# Patient Record
Sex: FEMALE | Race: WHITE | Employment: FULL TIME | URBAN - METROPOLITAN AREA
[De-identification: names, ages, dates, MRNs, and addresses within clinical notes are randomized per-mention and may not be internally consistent; named-entity substitution may affect disease eponyms.]

---

## 2018-06-04 ENCOUNTER — OFFICE VISIT (OUTPATIENT)
Dept: FAMILY MEDICINE CLINIC | Age: 38
End: 2018-06-04

## 2018-06-04 VITALS
WEIGHT: 166.6 LBS | SYSTOLIC BLOOD PRESSURE: 122 MMHG | BODY MASS INDEX: 29.51 KG/M2 | DIASTOLIC BLOOD PRESSURE: 60 MMHG | RESPIRATION RATE: 14 BRPM | HEART RATE: 60 BPM | OXYGEN SATURATION: 98 %

## 2018-06-04 DIAGNOSIS — G43.809 OTHER MIGRAINE WITHOUT STATUS MIGRAINOSUS, NOT INTRACTABLE: Primary | ICD-10-CM

## 2018-06-04 PROCEDURE — 99214 OFFICE O/P EST MOD 30 MIN: CPT | Performed by: FAMILY MEDICINE

## 2018-06-04 RX ORDER — TRAZODONE HYDROCHLORIDE 50 MG/1
50 TABLET ORAL NIGHTLY
COMMUNITY
End: 2019-01-08

## 2018-06-04 RX ORDER — SERTRALINE HYDROCHLORIDE 25 MG/1
25 TABLET, FILM COATED ORAL DAILY
COMMUNITY
End: 2019-01-08

## 2018-06-04 RX ORDER — SUMATRIPTAN 100 MG/1
1 TABLET, FILM COATED ORAL DAILY
COMMUNITY
Start: 2018-05-29 | End: 2019-01-08

## 2018-06-04 ASSESSMENT — PATIENT HEALTH QUESTIONNAIRE - PHQ9
SUM OF ALL RESPONSES TO PHQ9 QUESTIONS 1 & 2: 0
SUM OF ALL RESPONSES TO PHQ QUESTIONS 1-9: 0
1. LITTLE INTEREST OR PLEASURE IN DOING THINGS: 0
2. FEELING DOWN, DEPRESSED OR HOPELESS: 0

## 2018-06-05 DIAGNOSIS — G43.809 OTHER MIGRAINE WITHOUT STATUS MIGRAINOSUS, NOT INTRACTABLE: ICD-10-CM

## 2018-06-05 LAB
ANION GAP SERPL CALCULATED.3IONS-SCNC: 16 MMOL/L (ref 3–16)
BUN BLDV-MCNC: 23 MG/DL (ref 7–20)
C-REACTIVE PROTEIN: 2.7 MG/L (ref 0–5.1)
CALCIUM SERPL-MCNC: 8.8 MG/DL (ref 8.3–10.6)
CHLORIDE BLD-SCNC: 104 MMOL/L (ref 99–110)
CO2: 22 MMOL/L (ref 21–32)
CREAT SERPL-MCNC: 0.7 MG/DL (ref 0.6–1.1)
GFR AFRICAN AMERICAN: >60
GFR NON-AFRICAN AMERICAN: >60
GLUCOSE BLD-MCNC: 81 MG/DL (ref 70–99)
HCT VFR BLD CALC: 42.3 % (ref 36–48)
HEMOGLOBIN: 14.4 G/DL (ref 12–16)
MCH RBC QN AUTO: 28.7 PG (ref 26–34)
MCHC RBC AUTO-ENTMCNC: 34 G/DL (ref 31–36)
MCV RBC AUTO: 84.3 FL (ref 80–100)
PDW BLD-RTO: 13.2 % (ref 12.4–15.4)
PLATELET # BLD: 226 K/UL (ref 135–450)
PMV BLD AUTO: 8 FL (ref 5–10.5)
POTASSIUM SERPL-SCNC: 3.9 MMOL/L (ref 3.5–5.1)
RBC # BLD: 5.01 M/UL (ref 4–5.2)
SEDIMENTATION RATE, ERYTHROCYTE: 8 MM/HR (ref 0–20)
SODIUM BLD-SCNC: 142 MMOL/L (ref 136–145)
TSH SERPL DL<=0.05 MIU/L-ACNC: 0.01 UIU/ML (ref 0.27–4.2)
WBC # BLD: 7.2 K/UL (ref 4–11)

## 2018-06-05 PROCEDURE — 36415 COLL VENOUS BLD VENIPUNCTURE: CPT | Performed by: FAMILY MEDICINE

## 2018-06-07 DIAGNOSIS — R79.89 ABNORMAL TSH: Primary | ICD-10-CM

## 2018-06-07 DIAGNOSIS — R79.89 ABNORMAL TSH: ICD-10-CM

## 2018-06-07 LAB
FREE THYROXINE INDEX: 8.3 UG/DL (ref 4.4–11.4)
T3 UPTAKE PERCENT: 1 TBI (ref 0.8–1.3)
T4 FREE: 1.5 NG/DL (ref 0.9–1.8)
T4 TOTAL: 8.3 UG/DL (ref 4.5–10.9)

## 2018-08-10 ENCOUNTER — OFFICE VISIT (OUTPATIENT)
Dept: ORTHOPEDIC SURGERY | Age: 38
End: 2018-08-10

## 2018-08-10 VITALS
BODY MASS INDEX: 29.23 KG/M2 | SYSTOLIC BLOOD PRESSURE: 103 MMHG | WEIGHT: 165 LBS | HEIGHT: 63 IN | DIASTOLIC BLOOD PRESSURE: 69 MMHG | HEART RATE: 67 BPM

## 2018-08-10 DIAGNOSIS — S83.241A TEAR OF MEDIAL MENISCUS OF RIGHT KNEE, CURRENT, UNSPECIFIED TEAR TYPE, INITIAL ENCOUNTER: Primary | ICD-10-CM

## 2018-08-10 DIAGNOSIS — M25.561 RIGHT KNEE PAIN, UNSPECIFIED CHRONICITY: ICD-10-CM

## 2018-08-10 PROCEDURE — 99214 OFFICE O/P EST MOD 30 MIN: CPT | Performed by: ORTHOPAEDIC SURGERY

## 2018-08-10 ASSESSMENT — ENCOUNTER SYMPTOMS: BACK PAIN: 1

## 2018-08-11 NOTE — PROGRESS NOTES
Chief complaint is right knee pain. Patient voices fell last week and twisted her right knee. She's having pain and weakness. Her pain is present anteriorly. She also has medial sided pain with associated with twisting turning movements. He reports having had a meniscus surgery 15 years ago.     Pain Assessment  Location of Pain: Knee  Location Modifiers: Right  Severity of Pain: 6  Quality of Pain: Sharp, Aching  Duration of Pain: Persistent  Frequency of Pain: Constant  Aggravating Factors: Stairs, Bending  Limiting Behavior: Some  Relieving Factors: Rest, Nsaids  Result of Injury: Yes  Work-Related Injury: No  Are there other pain locations you wish to document?: No    Past Medical History:   Diagnosis Date    Arthritis     Bipolar affective disorder (HCC)     Borderline personality disorder     IBS (irritable bowel syndrome)     Neck pain     Post hysterectomy menopause syndrome         Past Surgical History:   Procedure Laterality Date    APPENDECTOMY      CHOLECYSTECTOMY      HYSTERECTOMY      ovarian cyst, adenomyosis- benign    KNEE ARTHROSCOPY Right     KNEE SURGERY      OVARIAN CYST REMOVAL      OVARY REMOVAL      R    SINUS SURGERY      TUBAL LIGATION         Family History   Problem Relation Age of Onset    Mental Retardation Father         bipolar    Mental Retardation Sister         bipolar       Social History     Social History    Marital status:      Spouse name: N/A    Number of children: 2    Years of education: N/A     Occupational History    massage sales      Social History Main Topics    Smoking status: Never Smoker    Smokeless tobacco: Never Used    Alcohol use No    Drug use: No    Sexual activity: Yes     Partners: Male      Comment: M, 2 kids,      Other Topics Concern    None     Social History Narrative    None       Current Outpatient Prescriptions   Medication Sig Dispense Refill    lurasidone (LATUDA) 80 MG TABS tablet Take 80 mg by

## 2018-08-17 ENCOUNTER — OFFICE VISIT (OUTPATIENT)
Dept: ORTHOPEDIC SURGERY | Age: 38
End: 2018-08-17

## 2018-08-17 VITALS
SYSTOLIC BLOOD PRESSURE: 103 MMHG | HEIGHT: 63 IN | BODY MASS INDEX: 29.23 KG/M2 | HEART RATE: 85 BPM | DIASTOLIC BLOOD PRESSURE: 74 MMHG | WEIGHT: 165 LBS

## 2018-08-17 DIAGNOSIS — M17.11 PATELLOFEMORAL ARTHRITIS OF RIGHT KNEE: Primary | ICD-10-CM

## 2018-08-17 DIAGNOSIS — M71.21 BAKER'S CYST OF KNEE, RIGHT: ICD-10-CM

## 2018-08-17 PROCEDURE — 99214 OFFICE O/P EST MOD 30 MIN: CPT | Performed by: ORTHOPAEDIC SURGERY

## 2018-08-17 RX ORDER — MELOXICAM 15 MG/1
15 TABLET ORAL DAILY
Qty: 30 TABLET | Refills: 2 | Status: SHIPPED | OUTPATIENT
Start: 2018-08-17 | End: 2019-01-08

## 2018-08-17 ASSESSMENT — ENCOUNTER SYMPTOMS: BACK PAIN: 1

## 2018-08-17 NOTE — PROGRESS NOTES
46yo female comes in today for review of her right knee mri images. She states her pain is unchanged from her visit 7 days ago. There was concerned for a meniscus tear. Her pain has been present for a couple weeks and, she has been taking 800mg ibuprofen, BID. She continues to complain of anterior knee pain that bothers her mainly with stairs and running. MRI images reviewed showing Baker's cyst and patellofemoral arthritis.      Patellofemoral arthritis, right knee  Baker's cyst, right knee    -meloxicam   -PT   -f/u 4 weeks

## 2018-08-21 ENCOUNTER — HOSPITAL ENCOUNTER (OUTPATIENT)
Dept: PHYSICAL THERAPY | Age: 38
Setting detail: THERAPIES SERIES
Discharge: HOME | End: 2018-08-21
Payer: COMMERCIAL

## 2018-08-21 PROCEDURE — G8978 MOBILITY CURRENT STATUS: HCPCS | Performed by: PHYSICAL THERAPIST

## 2018-08-21 PROCEDURE — 97110 THERAPEUTIC EXERCISES: CPT | Performed by: PHYSICAL THERAPIST

## 2018-08-21 PROCEDURE — 97161 PT EVAL LOW COMPLEX 20 MIN: CPT | Performed by: PHYSICAL THERAPIST

## 2018-08-21 PROCEDURE — G8979 MOBILITY GOAL STATUS: HCPCS | Performed by: PHYSICAL THERAPIST

## 2018-08-21 PROCEDURE — 97112 NEUROMUSCULAR REEDUCATION: CPT | Performed by: PHYSICAL THERAPIST

## 2018-08-21 NOTE — PLAN OF CARE
The [address] [place] [address]  Phone [phone]  Fax [phone]                                                       Physical Therapy Certification    Dear Referring Practitioner: Dr. Angel Sawyer ,    We had the pleasure of evaluating the following patient for physical therapy services at [address] [place] [address]. A summary of our findings can be found in the initial assessment below. This includes our plan of care. If you have any questions or concerns regarding these findings, please do not hesitate to contact me at the office phone number checked above. Thank you for the referral.       Physician Signature:_______________________________Date:__________________  By signing above (or electronic signature), therapists plan is approved by physician      Patient: Nusrat López   : 1980   MRN: [id number]  Referring Physician: Referring Practitioner: Dr. Angel Sawyer       Evaluation Date: 2018      Medical Diagnosis Information:  Diagnosis: M17.11 (ICD-10-CM) - Patellofemoral arthritis of right knee, M71.21 (ICD-10-CM) - Baker's cyst of knee, right    Treatment Diagnosis: Right knee pain                                          Insurance information: PT Insurance Information: [place]      Precautions/ Contra-indications:   Latex Allergy:  [x]NO      []YES  Preferred Language for Healthcare:   [x]English       []other:    SUBJECTIVE: Patient stated complaint: Pt reports she has a long standing history of right knee pain. She states she did have an injury at the beginning of August - she was at [facility] with her daughter - walking across a balance beam and slipped. She did experience catching and giving out. 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 n/a     Gait: (include devices/WB status) mod limp present     Orthopedic Special Tests: see above                        [x] Patient history, allergies, meds reviewed. Medical chart reviewed. See intake form. Review Of Systems (ROS):  [x]Performed Review of systems (Integumentary, CardioPulmonary, Neurological) by intake and observation. Intake form has been scanned into medical record. Patient has been instructed to contact their primary care physician regarding ROS issues if not already being addressed at this time. Co-morbidities/Complexities (which will affect course of rehabilitation):   []None           Arthritic conditions   []Rheumatoid arthritis (M05.9)  [x]Osteoarthritis (M19.91)   Cardiovascular conditions   []Hypertension (I10)  []Hyperlipidemia (E78.5)  []Angina pectoris (I20)  []Atherosclerosis (I70)   Musculoskeletal conditions   []Disc pathology   []Congenital spine pathologies   []Prior surgical intervention  []Osteoporosis (M81.8)  []Osteopenia (M85.8)   Endocrine conditions   []Hypothyroid (E03.9)  []Hyperthyroid Gastrointestinal conditions   []Constipation (N69.63)   Metabolic conditions   []Morbid obesity (E66.01)  []Diabetes type 1(E10.65) or 2 (E11.65)   []Neuropathy (G60.9)     Pulmonary conditions   []Asthma (J45)  []Coughing   []COPD (J44.9)   Psychological Disorders  []Anxiety (F41.9)  []Depression (F32.9)   []Other:   []Other:          Barriers to/and or personal factors that will affect rehab potential:              [x]Age  [x]Sex              [x]Motivation/Lack of Motivation                        [x]Co-Morbidities              []Cognitive Function, education/learning barriers              []Environmental, home barriers              [x]profession/work barriers (stands for 12 hours per day)   []past PT/medical experience  []other:  Justification:      Falls Risk Assessment (30 days):   [x] Falls Risk assessed and no intervention required.   [] Falls Risk assessed and Patient requires intervention due to being higher risk   TUG score (>12s at risk):     [] Falls education provided, including       G-Codes:  PT G-Codes  Functional Assessment Tool Used: LEFS   Score: 48/80 - 40% limited    Functional Limitation: Mobility: Walking and moving around  Mobility: Walking and Moving Around Current Status (): At least 40 percent but less than 60 percent impaired, limited or restricted  Mobility: Walking and Moving Around Goal Status (): At least 1 percent but less than 20 percent impaired, limited or restricted    ASSESSMENT: Patient is a 41year old female who presents with decreased functional mobility and strength of right knee due to patellofemoral arthritis with a Baker's cyst.  Expectations, POC and diagnosis was reviewed with patient. Patient reports understanding of all education provided and is in agreement with plan of care. Patient will benefit from skilled physical therapy to address outlined problems and goals.      Functional Impairments:     []Noted lumbar/proximal hip/LE hypomobility   [x]Decreased LE functional ROM   [x]Decreased core/proximal hip strength and neuromuscular control   [x]Decreased LE functional strength   [x]Reduced balance/proprioceptive control   []other:      Functional Activity Limitations (from functional questionnaire and intake)   [x]Reduced ability to tolerate prolonged functional positions   [x]Reduced ability or difficulty with changes of positions or transfers between positions   [x]Reduced ability to maintain good posture and demonstrate good body mechanics with sitting, bending, and lifting   []Reduced ability to sleep   [] Reduced ability or tolerance with driving and/or computer work   []Reduced ability to perform lifting, carrying tasks   [x]Reduced ability to squat   []Reduced ability to forward bend   [x]Reduced ability to ambulate prolonged functional periods/distances/surfaces   [x]Reduced ability to ascend/descend characteristics;   [x] Clinical decision making of [x] low, [] moderate, [] high complexity using standardized patient assessment instrument and/or measurable assessment of functional outcome. [x] EVAL (LOW) 41685 (typically 20 minutes face-to-face)  [] EVAL (MOD) 40279 (typically 30 minutes face-to-face)  [] EVAL (HIGH) 20530 (typically 45 minutes face-to-face)  [] RE-EVAL       PLAN  Frequency/Duration:  1-2 days per week for 4 Weeks:  Interventions:  [x]  Therapeutic exercise including: strength training, ROM, for Lower extremity and core   [x]  NMR activation and proprioception for LE, Glutes and Core   [x]  Manual therapy as indicated for LE, Hip and spine to include: Dry Needling/IASTM, STM, PROM, Gr I-IV mobilizations, manipulation. [x] Modalities as needed that may include: thermal agents, E-stim, Biofeedback, US, iontophoresis as indicated  [x] Patient education on joint protection, postural re-education, activity modification, progression of HEP. HEP instruction: Provided written and verbal instructions(see scanned forms)    GOALS:  Patient stated goal: Get rid of pain; work without limitations     Therapist goals for Patient:   Short Term Goals: To be achieved in: 2 weeks  1. Independent in HEP and progression per patient tolerance, in order to prevent re-injury. 2. Patient will have a decrease in pain to facilitate improvement in movement, function, and ADLs as indicated by Functional Deficits. Long Term Goals: To be achieved in: 6-8 weeks  1. Disability index score of 5% or less for the LEFS to assist with reaching prior level of function. 2. Patient will demonstrate increased AROM to WNL (0-140 deg) to allow for proper joint functioning as indicated by patients Functional Deficits. 3. Patient will demonstrate an increase in Strength to good proximal hip strength and control in LE (MMT at least 4+/5) to allow for proper functional mobility as indicated by patients Functional Deficits.

## 2018-08-21 NOTE — FLOWSHEET NOTE
[place] [id number] [address]  Phone: [phone]   Fax:     [phone]    Physical Therapy Daily Treatment Note  Date:  2018    Patient Name:  Bob Shabazz    :  1980  MRN: [id number]  Restrictions/Precautions:    Medical/Treatment Diagnosis Information:  · Diagnosis: M17.11 (ICD-10-CM) - Patellofemoral arthritis of right knee, M71.21 (ICD-10-CM) - Baker's cyst of knee, right   · Treatment Diagnosis: Right knee pain   Insurance/Certification information:  PT Insurance Information: [place]   Physician Information:  Referring Practitioner: Dr. Kimi Mccrary of care signed (Y/N):     Date of Patient follow up with Physician: 18    G-Code (if applicable):      Date G-Code Applied:  18  PT G-Codes  Functional Assessment Tool Used: LEFS   Score: 48/80 - 40% limited    Functional Limitation: Mobility: Walking and moving around  Mobility: Walking and Moving Around Current Status (): At least 40 percent but less than 60 percent impaired, limited or restricted  Mobility: Walking and Moving Around Goal Status ():  At least 1 percent but less than 20 percent impaired, limited or restricted    Progress Note: []  Yes  []  No  Next due by: Visit #10       Latex Allergy:  [x]NO      []YES  Preferred Language for Healthcare:   [x]English       []other:    Visit # Insurance Allowable   1 20   [facility]     Pain level:  4-7/10     SUBJECTIVE:  See eval    OBJECTIVE: See eval  Observation:   Test measurements:      RESTRICTIONS/PRECAUTIONS:     Exercises/Interventions:   Exercise/Equipment Resistance/Repetitions Other comments   Stretching     Hamstring 5x30\"     Towel Pull     Inclined Calf 5x30\"     Hip Flexion     ITB npv     Groin     Quad                    SLR     Supine 2x10  Towel roll under knee    Abduction 3x10     Adduction BS 10x10\"     Prone     SLR+          Isometrics Quad sets 10x10\"          Patellar Glides     Medial     Superior     Inferior          ROM     Sheet Pulls 10x10\"     Hang Weights     Passive     Active     Weight Shift     Ankle Pumps                    CKC     Calf raises     Wall sits     Step ups     1 leg stand     Squatting     CC TKE npv     Balance npv     bridges          PRE     Extension  RANGE:   Flexion  RANGE:        Quantum machines     Leg press      Leg extension     Leg curl          Manual interventions                   Therapeutic Exercise and NMR EXR  [x] (52443) Provided verbal/tactile cueing for activities related to strengthening, flexibility, endurance, ROM for improvements in LE, proximal hip, and core control with self care, mobility, lifting, ambulation.  [] (32890) Provided verbal/tactile cueing for activities related to improving balance, coordination, kinesthetic sense, posture, motor skill, proprioception  to assist with LE, proximal hip, and core control in self care, mobility, lifting, ambulation and eccentric single leg control.      NMR and Therapeutic Activities:    [x] (65116 or 27059) Provided verbal/tactile cueing for activities related to improving balance, coordination, kinesthetic sense, posture, motor skill, proprioception and motor activation to allow for proper function of core, proximal hip and LE with self care and ADLs  [] (99328) Gait Re-education- Provided training and instruction to the patient for proper LE, core and proximal hip recruitment and positioning and eccentric body weight control with ambulation re-education including up and down stairs     Home Exercise Program:    [x] (68173) Reviewed/Progressed HEP activities related to strengthening, flexibility, endurance, ROM of core, proximal hip and LE for functional self-care, mobility, lifting and ambulation/stair navigation   [] (67014)Reviewed/Progressed HEP activities related to improving balance, coordination, kinesthetic sense, posture, motor skill, proprioception of core, proximal hip and LE for self care, mobility, lifting, and ambulation/stair navigation      Manual Treatments:  PROM / STM / Oscillations-Mobs:  G-I, II, III, IV (PA's, Inf., Post.)  [] (00968) Provided manual therapy to mobilize LE, proximal hip and/or LS spine soft tissue/joints for the purpose of modulating pain, promoting relaxation,  increasing ROM, reducing/eliminating soft tissue swelling/inflammation/restriction, improving soft tissue extensibility and allowing for proper ROM for normal function with self care, mobility, lifting and ambulation. Modalities:  Ice 10'     Charges:  Timed Code Treatment Minutes: 30   Total Treatment Minutes: 65       [x] EVAL (LOW) 19459 (typically 20 minutes face-to-face)  [] EVAL (MOD) 63874 (typically 30 minutes face-to-face)  [] EVAL (HIGH) 87209 (typically 45 minutes face-to-face)  [] RE-EVAL   [x] HX(68977) x  1   [] IONTO  [x] NMR (92415) x  1   [] VASO  [] Manual (96808) x       [] Other:  [] TA x       [] Mech Traction (29116)  [] ES(attended) (45940)      [] ES (un) (11016):     GOALS:   Patient stated goal: Get rid of pain; work without limitations     Therapist goals for Patient:   Short Term Goals: To be achieved in: 2 weeks  1. Independent in HEP and progression per patient tolerance, in order to prevent re-injury. 2. Patient will have a decrease in pain to facilitate improvement in movement, function, and ADLs as indicated by Functional Deficits. Long Term Goals: To be achieved in: 6-8 weeks  1. Disability index score of 5% or less for the LEFS to assist with reaching prior level of function. 2. Patient will demonstrate increased AROM to WNL (0-140 deg) to allow for proper joint functioning as indicated by patients Functional Deficits.    3. Patient will demonstrate an increase in Strength to good proximal hip strength and control in LE (MMT at least 4+/5) to allow for proper functional mobility as indicated by patients Functional Deficits. 4. Patient will return to daily functional activities without increased symptoms or restriction. 5. Patient will be able to stand for > or = to 5 hours without increased symptoms or restriction     Progression Towards Functional goals:  [] Patient is progressing as expected towards functional goals listed. [] Progression is slowed due to complexities listed. [] Progression has been slowed due to co-morbidities. [x] Plan just implemented, too soon to assess goals progression  [] Other:     ASSESSMENT:  See eval    Treatment/Activity Tolerance:  [x] Patient tolerated treatment well [] Patient limited by fatique  [] Patient limited by pain  [] Patient limited by other medical complications  [] Other:     Patient education:  8/21: Patient education on PT and plan of care including diagnosis, prognosis, treatment goals and options. Patient agrees with discussed POC and treatment and is aware of rehab process. Pt was also educated on clinic layout and use of modalities. Prognosis: [] Good [] Fair  [] Poor    Patient Requires Follow-up: [x] Yes  [] No    PLAN: 1-2x per week for 4 weeks.  8/21/18-9/21/18  [] Continue per plan of care [] Alter current plan (see comments)  [x] Plan of care initiated [] Hold pending MD visit [] Discharge    Electronically signed by: Dory Ulrich PT, DPT

## 2018-08-23 ENCOUNTER — HOSPITAL ENCOUNTER (OUTPATIENT)
Dept: PHYSICAL THERAPY | Age: 38
Setting detail: THERAPIES SERIES
Discharge: HOME | End: 2018-08-23
Payer: COMMERCIAL

## 2018-08-23 PROCEDURE — 97110 THERAPEUTIC EXERCISES: CPT | Performed by: PHYSICAL THERAPIST

## 2018-08-23 PROCEDURE — 97112 NEUROMUSCULAR REEDUCATION: CPT | Performed by: PHYSICAL THERAPIST

## 2018-08-23 NOTE — FLOWSHEET NOTE
[address], [address] [place] [address]  Phone: [phone]   Fax:     [phone]    Physical Therapy Daily Treatment Note  Date:  2018    Patient Name:  Lashell Wu    :  1980  MRN: [id number]  Restrictions/Precautions:    Medical/Treatment Diagnosis Information:  · Diagnosis: M17.11 (ICD-10-CM) - Patellofemoral arthritis of right knee, M71.21 (ICD-10-CM) - Baker's cyst of knee, right   · Treatment Diagnosis: Right knee pain   Insurance/Certification information:  PT Insurance Information: [place]   Physician Information:  Referring Practitioner: Dr. Nino Marquis of care signed (Y/N):     Date of Patient follow up with Physician: 18    G-Code (if applicable):      Date G-Code Applied:  18       Progress Note: []  Yes  []  No  Next due by: Visit #10       Latex Allergy:  [x]NO      []YES  Preferred Language for Healthcare:   [x]English       []other:    Visit # Insurance Allowable   2 20   [facility]     Pain level:  2/10 ()     SUBJECTIVE:  No complaints.       OBJECTIVE: See eval  Observation:   Test measurements:      RESTRICTIONS/PRECAUTIONS:     Exercises/Interventions:   Exercise/Equipment Resistance/Repetitions Other comments   Stretching     Hamstring 5x30\"     Towel Pull     Inclined Calf 5x30\"     Hip Flexion     ITB w/ strap  5x30\"  Added    Groin     Quad                    SLR     Supine 2x10      Abduction 3x10     Adduction BS 10x10\"     Prone     SLR+ NPV          Isometrics     Quad sets 10x10\"          Patellar Glides     Medial     Superior     Inferior          ROM     Sheet Pulls 10x10\"     Hang Weights     Passive     Active     Weight Shift     Ankle Pumps                    CKC     Calf raises     Wall sits     Step ups     1 leg stand     Squatting     CC TKE Silver 2x10   Added    Balance - tandem stance 2x30\" each way  Added    bridges 3x10 Added 8/23        PRE     Extension  RANGE:   Flexion NPV  RANGE:        Quantum machines     Leg press      Leg extension     Leg curl          Manual interventions                   Therapeutic Exercise and NMR EXR  [x] (04491) Provided verbal/tactile cueing for activities related to strengthening, flexibility, endurance, ROM for improvements in LE, proximal hip, and core control with self care, mobility, lifting, ambulation.  [] (45909) Provided verbal/tactile cueing for activities related to improving balance, coordination, kinesthetic sense, posture, motor skill, proprioception  to assist with LE, proximal hip, and core control in self care, mobility, lifting, ambulation and eccentric single leg control.      NMR and Therapeutic Activities:    [x] (34840 or 58177) Provided verbal/tactile cueing for activities related to improving balance, coordination, kinesthetic sense, posture, motor skill, proprioception and motor activation to allow for proper function of core, proximal hip and LE with self care and ADLs  [] (98495) Gait Re-education- Provided training and instruction to the patient for proper LE, core and proximal hip recruitment and positioning and eccentric body weight control with ambulation re-education including up and down stairs     Home Exercise Program:    [x] (56157) Reviewed/Progressed HEP activities related to strengthening, flexibility, endurance, ROM of core, proximal hip and LE for functional self-care, mobility, lifting and ambulation/stair navigation   [] (78338)Reviewed/Progressed HEP activities related to improving balance, coordination, kinesthetic sense, posture, motor skill, proprioception of core, proximal hip and LE for self care, mobility, lifting, and ambulation/stair navigation      Manual Treatments:  PROM / STM / Oscillations-Mobs:  G-I, II, III, IV (PA's, Inf., Post.)  [] (87465) Provided manual therapy to mobilize LE, proximal hip and/or LS spine soft tissue/joints for the purpose of modulating pain, promoting relaxation,  increasing ROM, reducing/eliminating soft tissue swelling/inflammation/restriction, improving soft tissue extensibility and allowing for proper ROM for normal function with self care, mobility, lifting and ambulation. Modalities:  Ice 10'     Charges:  Timed Code Treatment Minutes: 30   Total Treatment Minutes: 454-997       [] EVAL (LOW) 30474 (typically 20 minutes face-to-face)  [] EVAL (MOD) 18075 (typically 30 minutes face-to-face)  [] EVAL (HIGH) 53664 (typically 45 minutes face-to-face)  [] RE-EVAL   [x] JW(53335) x  1   [] IONTO  [x] NMR (35433) x  1   [] VASO  [] Manual (73961) x       [] Other:  [] TA x       [] Mech Traction (25616)  [] ES(attended) (61620)      [] ES (un) (18235):     GOALS:   Patient stated goal: Get rid of pain; work without limitations     Therapist goals for Patient:   Short Term Goals: To be achieved in: 2 weeks  1. Independent in HEP and progression per patient tolerance, in order to prevent re-injury. 2. Patient will have a decrease in pain to facilitate improvement in movement, function, and ADLs as indicated by Functional Deficits. Long Term Goals: To be achieved in: 6-8 weeks  1. Disability index score of 5% or less for the LEFS to assist with reaching prior level of function. 2. Patient will demonstrate increased AROM to WNL (0-140 deg) to allow for proper joint functioning as indicated by patients Functional Deficits. 3. Patient will demonstrate an increase in Strength to good proximal hip strength and control in LE (MMT at least 4+/5) to allow for proper functional mobility as indicated by patients Functional Deficits. 4. Patient will return to daily functional activities without increased symptoms or restriction.    5. Patient will be able to stand for > or = to 5 hours without increased symptoms or restriction     Progression Towards Functional goals:  [] Patient is progressing as expected towards functional

## 2018-08-28 ENCOUNTER — HOSPITAL ENCOUNTER (OUTPATIENT)
Dept: PHYSICAL THERAPY | Age: 38
Setting detail: THERAPIES SERIES
Discharge: HOME | End: 2018-08-28
Payer: COMMERCIAL

## 2018-08-28 PROCEDURE — 97530 THERAPEUTIC ACTIVITIES: CPT | Performed by: PHYSICAL THERAPIST

## 2018-08-28 PROCEDURE — 97110 THERAPEUTIC EXERCISES: CPT | Performed by: PHYSICAL THERAPIST

## 2018-08-28 NOTE — FLOWSHEET NOTE
[place] [id number] [address]  Phone: [phone]   Fax:     [phone]    Physical Therapy Daily Treatment Note  Date:  2018    Patient Name:  Jayda Baker    :  1980  MRN: [id number]  Restrictions/Precautions:    Medical/Treatment Diagnosis Information:  · Diagnosis: M17.11 (ICD-10-CM) - Patellofemoral arthritis of right knee, M71.21 (ICD-10-CM) - Baker's cyst of knee, right   · Treatment Diagnosis: Right knee pain   Insurance/Certification information:  PT Insurance Information: [place]   Physician Information:  Referring Practitioner: Dr. Siddhartha Yost of care signed (Y/N):     Date of Patient follow up with Physician: 18    G-Code (if applicable):      Date G-Code Applied:  18       Progress Note: []  Yes  []  No  Next due by: Visit #10       Latex Allergy:  [x]NO      []YES  Preferred Language for Healthcare:   [x]English       []other:    Visit # Insurance Allowable   3  () 20   [employer]    7 visits approved through 10/5     Pain level:  2/10 ()     SUBJECTIVE:  No new issues.       OBJECTIVE: See eval  Observation:   Test measurements:      RESTRICTIONS/PRECAUTIONS:     Exercises/Interventions:     Bike 5' warm up     Exercise/Equipment Resistance/Repetitions Other comments   Stretching     Hamstring 5x30\"     Towel Pull     Inclined Calf 5x30\"     Hip Flexion     ITB w/ strap  5x30\"  Added    Groin     Quad                    SLR     Supine 3x10      Abduction 3x10     Adduction 3x10  ^    Prone     SLR+ NPV          Isometrics     Quad sets 10x10\"          Patellar Glides     Medial     Superior     Inferior          ROM     Sheet Pulls 10x10\"     Hang Weights     Passive     Active     Weight Shift     Ankle Pumps                    CKC     Calf raises     Wall sits     Step ups     1 leg stand     Squatting     CC TKE Silver 2x10   Added  mobilize LE, proximal hip and/or LS spine soft tissue/joints for the purpose of modulating pain, promoting relaxation,  increasing ROM, reducing/eliminating soft tissue swelling/inflammation/restriction, improving soft tissue extensibility and allowing for proper ROM for normal function with self care, mobility, lifting and ambulation. Modalities:  None     Charges:  Timed Code Treatment Minutes: 35   Total Treatment Minutes: 345-700       [] EVAL (LOW) 21293 (typically 20 minutes face-to-face)  [] EVAL (MOD) 47017 (typically 30 minutes face-to-face)  [] EVAL (HIGH) 35302 (typically 45 minutes face-to-face)  [] RE-EVAL   [x] BK(19806) x  1   [] IONTO  [] NMR (03485) x      [] VASO  [] Manual (49970) x       [] Other:  [x] TA x  1    [] Mech Traction (55287)  [] ES(attended) (42381)      [] ES (un) (52376):     GOALS:   Patient stated goal: Get rid of pain; work without limitations     Therapist goals for Patient:   Short Term Goals: To be achieved in: 2 weeks  1. Independent in HEP and progression per patient tolerance, in order to prevent re-injury. 2. Patient will have a decrease in pain to facilitate improvement in movement, function, and ADLs as indicated by Functional Deficits. Long Term Goals: To be achieved in: 6-8 weeks  1. Disability index score of 5% or less for the LEFS to assist with reaching prior level of function. 2. Patient will demonstrate increased AROM to WNL (0-140 deg) to allow for proper joint functioning as indicated by patients Functional Deficits. 3. Patient will demonstrate an increase in Strength to good proximal hip strength and control in LE (MMT at least 4+/5) to allow for proper functional mobility as indicated by patients Functional Deficits. 4. Patient will return to daily functional activities without increased symptoms or restriction.    5. Patient will be able to stand for > or = to 5 hours without increased symptoms or restriction     Progression Towards Functional

## 2018-08-30 ENCOUNTER — APPOINTMENT (OUTPATIENT)
Dept: PHYSICAL THERAPY | Age: 38
End: 2018-08-30
Payer: COMMERCIAL

## 2018-08-31 ENCOUNTER — HOSPITAL ENCOUNTER (OUTPATIENT)
Dept: PHYSICAL THERAPY | Age: 38
Setting detail: THERAPIES SERIES
Discharge: HOME | End: 2018-08-31
Payer: COMMERCIAL

## 2018-08-31 PROCEDURE — 97530 THERAPEUTIC ACTIVITIES: CPT | Performed by: PHYSICAL THERAPIST

## 2018-08-31 PROCEDURE — 97110 THERAPEUTIC EXERCISES: CPT | Performed by: PHYSICAL THERAPIST

## 2018-08-31 NOTE — FLOWSHEET NOTE
Calf raises     Wall sits     Step ups     1 leg stand     Squatting     CC TKE Silver 2x10   Added 8/23   Balance - tandem stance 3x30\" each way  Added 8/23   bridges 3x10  Added 8/23        PRE     Extension  RANGE:   Flexion NPV  RANGE:        Quantum machines     Leg press      Leg extension     Leg curl          Manual interventions                   Therapeutic Exercise and NMR EXR  [x] (80482) Provided verbal/tactile cueing for activities related to strengthening, flexibility, endurance, ROM for improvements in LE, proximal hip, and core control with self care, mobility, lifting, ambulation.  [] (29032) Provided verbal/tactile cueing for activities related to improving balance, coordination, kinesthetic sense, posture, motor skill, proprioception  to assist with LE, proximal hip, and core control in self care, mobility, lifting, ambulation and eccentric single leg control.      NMR and Therapeutic Activities:    [x] (36936 or 32500) Provided verbal/tactile cueing for activities related to improving balance, coordination, kinesthetic sense, posture, motor skill, proprioception and motor activation to allow for proper function of core, proximal hip and LE with self care and ADLs  [] (50077) Gait Re-education- Provided training and instruction to the patient for proper LE, core and proximal hip recruitment and positioning and eccentric body weight control with ambulation re-education including up and down stairs     Home Exercise Program:    [x] (46002) Reviewed/Progressed HEP activities related to strengthening, flexibility, endurance, ROM of core, proximal hip and LE for functional self-care, mobility, lifting and ambulation/stair navigation   [] (76620)Reviewed/Progressed HEP activities related to improving balance, coordination, kinesthetic sense, posture, motor skill, proprioception of core, proximal hip and LE for self care, mobility, lifting, and ambulation/stair navigation      Manual Treatments:  PROM / STM / Oscillations-Mobs:  G-I, II, III, IV (PA's, Inf., Post.)  [] (19650) Provided manual therapy to mobilize LE, proximal hip and/or LS spine soft tissue/joints for the purpose of modulating pain, promoting relaxation,  increasing ROM, reducing/eliminating soft tissue swelling/inflammation/restriction, improving soft tissue extensibility and allowing for proper ROM for normal function with self care, mobility, lifting and ambulation. Modalities:  Ice 10'  Charges:  Timed Code Treatment Minutes: 35   Total Treatment Minutes: 9591-0634       [] EVAL (LOW) 31379 (typically 20 minutes face-to-face)  [] EVAL (MOD) 68993 (typically 30 minutes face-to-face)  [] EVAL (HIGH) 27832 (typically 45 minutes face-to-face)  [] RE-EVAL   [x] BQ(77750) x  1   [] IONTO  [] NMR (36720) x      [] VASO  [] Manual (71344) x       [] Other:  [x] TA x  1    [] Mech Traction (75168)  [] ES(attended) (33767)      [] ES (un) (45599):     GOALS:   Patient stated goal: Get rid of pain; work without limitations     Therapist goals for Patient:   Short Term Goals: To be achieved in: 2 weeks  1. Independent in HEP and progression per patient tolerance, in order to prevent re-injury. 2. Patient will have a decrease in pain to facilitate improvement in movement, function, and ADLs as indicated by Functional Deficits. Long Term Goals: To be achieved in: 6-8 weeks  1. Disability index score of 5% or less for the LEFS to assist with reaching prior level of function. 2. Patient will demonstrate increased AROM to WNL (0-140 deg) to allow for proper joint functioning as indicated by patients Functional Deficits. 3. Patient will demonstrate an increase in Strength to good proximal hip strength and control in LE (MMT at least 4+/5) to allow for proper functional mobility as indicated by patients Functional Deficits. 4. Patient will return to daily functional activities without increased symptoms or restriction.    5. Patient will be able to

## 2018-09-04 ENCOUNTER — HOSPITAL ENCOUNTER (OUTPATIENT)
Dept: PHYSICAL THERAPY | Age: 38
Setting detail: THERAPIES SERIES
Discharge: HOME | End: 2018-09-04
Payer: COMMERCIAL

## 2018-09-04 PROCEDURE — 97112 NEUROMUSCULAR REEDUCATION: CPT | Performed by: PHYSICAL THERAPIST

## 2018-09-04 PROCEDURE — 97110 THERAPEUTIC EXERCISES: CPT | Performed by: PHYSICAL THERAPIST

## 2018-09-04 NOTE — FLOWSHEET NOTE
9/4   Band walks NPV    CC TKE Silver 3x10   ^ 9/4   bridges 3x10  Added 8/23        PRE     Extension  RANGE:   Flexion 3x10 1#  RANGE: avail Added 9/4        Quantum machines     Leg press      Leg extension     Leg curl          Manual interventions                   Therapeutic Exercise and NMR EXR  [x] (79166) Provided verbal/tactile cueing for activities related to strengthening, flexibility, endurance, ROM for improvements in LE, proximal hip, and core control with self care, mobility, lifting, ambulation.  [] (88599) Provided verbal/tactile cueing for activities related to improving balance, coordination, kinesthetic sense, posture, motor skill, proprioception  to assist with LE, proximal hip, and core control in self care, mobility, lifting, ambulation and eccentric single leg control.      NMR and Therapeutic Activities:    [x] (28423 or 41558) Provided verbal/tactile cueing for activities related to improving balance, coordination, kinesthetic sense, posture, motor skill, proprioception and motor activation to allow for proper function of core, proximal hip and LE with self care and ADLs  [] (01316) Gait Re-education- Provided training and instruction to the patient for proper LE, core and proximal hip recruitment and positioning and eccentric body weight control with ambulation re-education including up and down stairs     Home Exercise Program:    [x] (26725) Reviewed/Progressed HEP activities related to strengthening, flexibility, endurance, ROM of core, proximal hip and LE for functional self-care, mobility, lifting and ambulation/stair navigation   [] (76997)Reviewed/Progressed HEP activities related to improving balance, coordination, kinesthetic sense, posture, motor skill, proprioception of core, proximal hip and LE for self care, mobility, lifting, and ambulation/stair navigation      Manual Treatments:  PROM / STM / Oscillations-Mobs:  G-I, II, III, IV (PA's, Inf., Post.)  [] (03736) Provided Towards Functional goals:  [] Patient is progressing as expected towards functional goals listed. [] Progression is slowed due to complexities listed. [] Progression has been slowed due to co-morbidities. [x] Plan just implemented, too soon to assess goals progression  [] Other:     ASSESSMENT:    Challenged with progressions today. 9/4    Treatment/Activity Tolerance:  [x] Patient tolerated treatment well [] Patient limited by fatique  [] Patient limited by pain  [] Patient limited by other medical complications  [] Other:     Patient education:  8/21: Patient education on PT and plan of care including diagnosis, prognosis, treatment goals and options. Patient agrees with discussed POC and treatment and is aware of rehab process. Pt was also educated on clinic layout and use of modalities. Prognosis: [x] Good [] Fair  [] Poor    Patient Requires Follow-up: [x] Yes  [] No    PLAN: 1-2x per week for 4 weeks.  8/21/18-9/21/18  [x] Continue per plan of care [] Alter current plan (see comments)  [] Plan of care initiated [] Hold pending MD visit [] Discharge    Electronically signed by: Nila Nash PT, DPT

## 2018-09-07 ENCOUNTER — HOSPITAL ENCOUNTER (OUTPATIENT)
Dept: PHYSICAL THERAPY | Age: 38
Setting detail: THERAPIES SERIES
Discharge: HOME | End: 2018-09-07
Payer: COMMERCIAL

## 2018-09-07 PROCEDURE — 97110 THERAPEUTIC EXERCISES: CPT | Performed by: PHYSICAL THERAPIST

## 2018-09-07 NOTE — FLOWSHEET NOTE
[address], [address] [place] [address]  Phone: [phone]   Fax:     [phone]    Physical Therapy Daily Treatment Note  Date:  2018    Patient Name:  Delmy Montgomery    :  1980  MRN: [id number]  Restrictions/Precautions:    Medical/Treatment Diagnosis Information:  · Diagnosis: M17.11 (ICD-10-CM) - Patellofemoral arthritis of right knee, M71.21 (ICD-10-CM) - Baker's cyst of knee, right   · Treatment Diagnosis: Right knee pain   Insurance/Certification information:  PT Insurance Information: [place]   Physician Information:  Referring Practitioner: Dr. Solomon Sales of care signed (Y/N):     Date of Patient follow up with Physician: 18    G-Code (if applicable):      Date G-Code Applied:  18       Progress Note: []  Yes  []  No  Next due by: Visit #10       Latex Allergy:  [x]NO      []YES  Preferred Language for Healthcare:   [x]English       []other:    Visit # Insurance Allowable   6  () 20   [employer]    7 visits approved through 10/5     Pain level:  4/10 ()     SUBJECTIVE:  Sore today.       OBJECTIVE: See eval  Observation:   Test measurements:      RESTRICTIONS/PRECAUTIONS:     Exercises/Interventions:         Exercise/Equipment Resistance/Repetitions Other comments   Stretching     Hamstring 5x30\"     Towel Pull     Inclined Calf 5x30\"     Hip Flexion     ITB w/ strap  5x30\"  Added    Groin     Quad                    SLR     Supine 3x10 1# ^    Abduction 3x10 1# ^    Adduction 3x10 1# ^    Prone     SLR+ 5x15\"   Added         Isometrics     Quad sets 10x10\"          Patellar Glides     Medial     Superior     Inferior          ROM     Sheet Pulls 10x10\"     Hang Weights     Passive     Active     Weight Shift     Ankle Pumps                    CKC     Calf raises     Wall sits 3x30\" blue band  ^    Step ups  L2 3x10  Added    1 leg stand 5x30\"  Added Post.)  [] (65617) Provided manual therapy to mobilize LE, proximal hip and/or LS spine soft tissue/joints for the purpose of modulating pain, promoting relaxation,  increasing ROM, reducing/eliminating soft tissue swelling/inflammation/restriction, improving soft tissue extensibility and allowing for proper ROM for normal function with self care, mobility, lifting and ambulation. Modalities:  Ice 15'    Charges:  Timed Code Treatment Minutes: 30   Total Treatment Minutes: 042-138       [] EVAL (LOW) 42684 (typically 20 minutes face-to-face)  [] EVAL (MOD) 76319 (typically 30 minutes face-to-face)  [] EVAL (HIGH) 43401 (typically 45 minutes face-to-face)  [] RE-EVAL   [x] LETICIA(98411) x  2   [] IONTO  [] NMR (20097) x      [] VASO  [] Manual (11999) x       [] Other:  [] TA x       [] Mech Traction (07986)  [] ES(attended) (65489)      [] ES (un) (41488):     GOALS:   Patient stated goal: Get rid of pain; work without limitations     Therapist goals for Patient:   Short Term Goals: To be achieved in: 2 weeks  1. Independent in HEP and progression per patient tolerance, in order to prevent re-injury. 2. Patient will have a decrease in pain to facilitate improvement in movement, function, and ADLs as indicated by Functional Deficits. Long Term Goals: To be achieved in: 6-8 weeks  1. Disability index score of 5% or less for the LEFS to assist with reaching prior level of function. 2. Patient will demonstrate increased AROM to WNL (0-140 deg) to allow for proper joint functioning as indicated by patients Functional Deficits. 3. Patient will demonstrate an increase in Strength to good proximal hip strength and control in LE (MMT at least 4+/5) to allow for proper functional mobility as indicated by patients Functional Deficits. 4. Patient will return to daily functional activities without increased symptoms or restriction.    5. Patient will be able to stand for > or = to 5 hours without increased symptoms or

## 2018-09-10 ENCOUNTER — OFFICE VISIT (OUTPATIENT)
Dept: FAMILY MEDICINE CLINIC | Age: 38
End: 2018-09-10

## 2018-09-10 VITALS
BODY MASS INDEX: 29.62 KG/M2 | DIASTOLIC BLOOD PRESSURE: 80 MMHG | TEMPERATURE: 98.7 F | HEART RATE: 90 BPM | WEIGHT: 167.2 LBS | OXYGEN SATURATION: 99 % | SYSTOLIC BLOOD PRESSURE: 102 MMHG

## 2018-09-10 DIAGNOSIS — J40 BRONCHITIS: Primary | ICD-10-CM

## 2018-09-10 PROCEDURE — 99213 OFFICE O/P EST LOW 20 MIN: CPT | Performed by: FAMILY MEDICINE

## 2018-09-10 RX ORDER — AMOXICILLIN 875 MG/1
875 TABLET, COATED ORAL 2 TIMES DAILY
Qty: 20 TABLET | Refills: 0 | Status: SHIPPED | OUTPATIENT
Start: 2018-09-10 | End: 2018-09-20

## 2018-09-10 ASSESSMENT — ENCOUNTER SYMPTOMS
ABDOMINAL PAIN: 0
COUGH: 1

## 2018-09-10 NOTE — PROGRESS NOTES
SUBJECTIVE:  Patient ID: Meme Nicole is a 41 y.o. female. Chief Complaint:  Chief Complaint   Patient presents with    Congestion    Cough     non prod. HPI   41 y old female  + congestion & cough x 2 days  Head hurt    & daughter on Antibiotic  OTC Mucinex & Alkazelzer plus  Past Medical History:   Diagnosis Date    Arthritis     Bipolar affective disorder ([facility] [address]     Borderline personality disorder     IBS (irritable bowel syndrome)     Neck pain     Post hysterectomy menopause syndrome      Past Surgical History:   Procedure Laterality Date    APPENDECTOMY  2007    CHOLECYSTECTOMY  2007    KNEE ARTHROSCOPY Right 2010    torn meniscus    KNEE SURGERY Right 2005    Knee cap    OVARY REMOVAL Right     [facility]    SINUS SURGERY  2000    YENNIFER AND BSO  2008    ovarian cyst, adenomyosis- benign    TUBAL LIGATION  2005    TYMPANOSTOMY TUBE PLACEMENT Bilateral     x 3      Allergies   Allergen Reactions    Ultram [Tramadol Hcl]      Hives    Vicodin [Hydrocodone-Acetaminophen] Nausea And Vomiting     Social History     Social History Narrative    Children 13 & 15 y old             Patient Active Problem List   Diagnosis    Suicide attempt ([facility] [address]    Intentional drug overdose ([facility] [address] [facility]    Bipolar disorder ([facility] [address] [facility]    Tachycardia    Post hysterectomy menopause syndrome    Back pain    Myofascial pain syndrome     Current Outpatient Prescriptions   Medication Sig Dispense Refill    meloxicam (MOBIC) 15 MG tablet Take 1 tablet by mouth daily 30 tablet 2    lurasidone (LATUDA) 80 MG TABS tablet Take 80 mg by mouth daily      sertraline (ZOLOFT) 25 MG tablet Take 25 mg by mouth daily      traZODone (DESYREL) 50 MG tablet Take 50 mg by mouth nightly      SUMAtriptan (IMITREX) 100 MG tablet Take 1 tablet by mouth daily       No current facility-administered medications for this visit.       Lab Results   Component Value Date    WBC 7.2 06/05/2018    HGB 14.4 06/05/2018    HCT 42.3 06/05/2018

## 2018-09-11 ENCOUNTER — OFFICE VISIT (OUTPATIENT)
Dept: ORTHOPEDIC SURGERY | Age: 38
End: 2018-09-11

## 2018-09-11 ENCOUNTER — PROCEDURE VISIT (OUTPATIENT)
Dept: ORTHOPEDIC SURGERY | Age: 38
End: 2018-09-11

## 2018-09-11 VITALS
HEART RATE: 72 BPM | HEIGHT: 63 IN | SYSTOLIC BLOOD PRESSURE: 123 MMHG | WEIGHT: 165 LBS | DIASTOLIC BLOOD PRESSURE: 87 MMHG | BODY MASS INDEX: 29.23 KG/M2

## 2018-09-11 DIAGNOSIS — M71.21 SYNOVIAL CYST OF RIGHT POPLITEAL SPACE: ICD-10-CM

## 2018-09-11 DIAGNOSIS — M25.561 RIGHT KNEE PAIN, UNSPECIFIED CHRONICITY: Primary | ICD-10-CM

## 2018-09-11 DIAGNOSIS — M17.11 PATELLOFEMORAL ARTHRITIS OF RIGHT KNEE: Primary | ICD-10-CM

## 2018-09-11 DIAGNOSIS — M94.261 CHONDROMALACIA OF KNEE, RIGHT: ICD-10-CM

## 2018-09-11 PROCEDURE — 20610 DRAIN/INJ JOINT/BURSA W/O US: CPT | Performed by: ORTHOPAEDIC SURGERY

## 2018-09-11 PROCEDURE — 99214 OFFICE O/P EST MOD 30 MIN: CPT | Performed by: ORTHOPAEDIC SURGERY

## 2018-09-11 ASSESSMENT — ENCOUNTER SYMPTOMS
SORE THROAT: 1
SINUS PAIN: 1

## 2018-09-11 NOTE — PROGRESS NOTES
Cortisone injection: right knee    2cc methylprednisolone 40mg    Lot:[id number]  Exp:09/2019  ROSA ELENA:[id number]    4cc ropivocaine 0.5%    VSU:[id number]  Exp:02/2022  Ndc: 80550-380-01

## 2018-09-11 NOTE — PROGRESS NOTES
Review of Systems   HENT: Positive for sinus pain and sore throat. Gastrointestinal:        Hemorrhoids    Musculoskeletal: Positive for joint pain. Right knee pain    Psychiatric/Behavioral: Positive for depression. All other systems reviewed and are negative.

## 2018-09-11 NOTE — PROGRESS NOTES
I performed physician planned RIGHT knee injection today. Patient tolerated procedure well with no immediate adverse sequels after the injection. The indications and risks of steroid injection as well as treatment alternatives were discussed with the patient who consented to the procedure. Under sterile conditions and after informed consent was obtained the patient was given an injection into the RIGHT knee. 40 mg of Depo-Medrol and 4 mL of 0.5% ropivacaine were placed in the knee after it was prepped with chlorhexidine. This resulted in good relief of symptoms. There were no complications. The patient was advised to ice the knee this evening and to avoid vigorous activities for the next 2 days. They were advised to call us if there was any erythema, enduration, swelling or increasing pain.         [address] [place] [address], PA-  9/11/2018

## 2018-09-11 NOTE — PROGRESS NOTES
History of Present Illness:  Autumn D Flako Faulkner is a pleasant 41 y.o. 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 Initially presented to our clinic in mid August 2018 at which time we treated ordered an MRI then treated via physical therapy (helping, one more visit remaining, at [place] office with Desire Sanchez) and Mobic (discontinued after 2 weeks because no change in symptoms). Ms. Flako Faulkner has also purchased a new pair of tennis shoes for comfort. She reports she's better than she was and is about 60-70% of her baseline because when she's at work she still has a lot of pain. The daily pain continues to primarily posterior. 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 No recent injuries. History of right knee scope years ago. Medical History:  Patient's medications, allergies, past medical, surgical, social and family histories were reviewed and updated as appropriate. Pertinent items are noted in HPI  Review of systems reviewed from Patient History Form dated on 9/11/2018 and available in the patient's chart under the Media tab. Vital Signs:  Vitals:    09/11/18 0914   BP: 123/87   Pulse: 72         Neuro: Alert & oriented x 3,  normal,  no focal deficits noted. Normal affect. Eyes: sclera clear  Ears: Normal external ear  Mouth:  No perioral lesions  Pulm: Respirations unlabored and regular  Pulse: Regular rate and rhythm   Skin: Warm, well perfused      Constitutional: In no apparent distress. Normal affect. Alert and oriented X3 and is cooperative. RIGHT Knee Examination:    Gait: No use of assistive devices. No antalgic gait. Alignment: Alignment appreciated. Inspection/skin: Quadriceps well developed. Skin is intact without erythema or ecchymosis. No gross deformity. Palpation: Crepitus. Palpable baker's cyst. Nontender along joint line. No pain with compression of patella. Nontender to light touch.     Range Diagnoses   Name Primary?  Patellofemoral arthritis of right knee Yes    Synovial cyst of right popliteal space     Chondromalacia of knee, right        Office Procedures:  Orders Placed This Encounter   Procedures    Ambulatory referral to Physical Therapy     Referral Priority:   Routine     Referral Type:   Eval and Treat     Referral Reason:   Specialty Services Required     Requested Specialty:   Physical Therapy     Number of Visits Requested:   1         Plan: Discussed that if she's feeling comfortable with her progression we can continue current treatment plan vs adding a cortisone injection today. Patient would like to proceed with cortisone injection today (injection performed by CHRISTOPHER, please see procedure note). Post injection care was reviewed. Follow-up as needed. Daren Hoang is in agreement with this plan. All questions were answered to patient's satisfaction and was encouraged to call with any further questions. Johana [address], PA-C  9/11/2018       During this examination, I, Johana [address], PA-C, functioned as a scribe for Dr. Jemima Sanchez. The history taking and physical examination were performed by Dr. Jemima Sanchez. All counseling during the appointment was performed between the patient and Dr. Jemima Sanchez. 9/11/2018 9:24 AM      This dictation was performed with a verbal recognition program (DRAGON) and it was checked for errors. It is possible that there are still dictated errors within this office note. If so, please bring any errors to my attention for an addendum. All efforts were made to ensure that this office note is accurate.

## 2018-09-14 NOTE — PROGRESS NOTES
History of Present Illness:  Danette Bar is a pleasant 41 y.o. 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 Initially presented to our clinic in mid August 2018 at which time we treated ordered an MRI then treated via physical therapy (helping, one more visit remaining, at [facility] office with Heydi Jeff) and Mobic (discontinued after 2 weeks because no change in symptoms). Ms. Christie Bar has also purchased a new pair of tennis shoes for comfort. She reports she's better than she was and is about 60-70% of her baseline because when she's at work she still has a lot of pain. The daily pain continues to primarily posterior. 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 No recent injuries. History of right knee scope years ago. Medical History:  Patient's medications, allergies, past medical, surgical, social and family histories were reviewed and updated as appropriate. Pertinent items are noted in HPI  Review of systems reviewed from Patient History Form dated on 9/11/2018 and available in the patient's chart under the Media tab. Vital Signs:  Vitals:    09/11/18 0914   BP: 123/87   Pulse: 72         Neuro: Alert & oriented x 3,  normal,  no focal deficits noted. Normal affect. Eyes: sclera clear  Ears: Normal external ear  Mouth:  No perioral lesions  Pulm: Respirations unlabored and regular  Pulse: Regular rate and rhythm   Skin: Warm, well perfused      Constitutional: In no apparent distress. Normal affect. Alert and oriented X3 and is cooperative. RIGHT Knee Examination:    Gait: No use of assistive devices. No antalgic gait. Alignment: Alignment appreciated. Inspection/skin: Quadriceps well developed. Skin is intact without erythema or ecchymosis. No gross deformity. Palpation: Crepitus. Palpable baker's cyst. Nontender along joint line. No pain with compression of patella. Nontender to light touch.     Range Diagnoses   Name Primary?  Patellofemoral arthritis of right knee Yes    Synovial cyst of right popliteal space     Chondromalacia of knee, right        Office Procedures:  Orders Placed This Encounter   Procedures    Ambulatory referral to Physical Therapy     Referral Priority:   Routine     Referral Type:   Eval and Treat     Referral Reason:   Specialty Services Required     Requested Specialty:   Physical Therapy     Number of Visits Requested:   1         Plan: Discussed that if she's feeling comfortable with her progression we can continue current treatment plan vs adding a cortisone injection today. Patient would like to proceed with cortisone injection today (injection performed by CHRISTOPHER, please see procedure note). Post injection care was reviewed. Follow-up as needed. Daniel Drummond is in agreement with this plan. All questions were answered to patient's satisfaction and was encouraged to call with any further questions. CHRISTOPHER Mosley  9/14/2018       During this examination, IDimitri PA-C, functioned as a scribe for Dr. Hieu Friend. The history taking and physical examination were performed by Dr. Hieu Friend. All counseling during the appointment was performed between the patient and Dr. Hieu Friend. 9/14/2018 10:54 AM      This dictation was performed with a verbal recognition program (DRAGON) and it was checked for errors. It is possible that there are still dictated errors within this office note. If so, please bring any errors to my attention for an addendum. All efforts were made to ensure that this office note is accurate. Greater than 50% of the visit was spent counseling the patient. I personally reviewed the patient's pain scale, review of systems, family history, social history, past medical history, allergies and medications. 13 point review of systems was collected and reviewed today. It is noncontributory.     I personally performed the services described in this documentation and scribed by Dimitri Adames MD  Sports Medicine, Arthroscopic Knee and Shoulder Surgery    This dictation was performed with a verbal recognition program [facility]) and it was checked for errors. It is possible that there are still dictated errors within this office note. If so, please bring any errors to my attention for an addendum. All efforts were made to ensure that this office note is accurate.

## 2018-09-18 ENCOUNTER — APPOINTMENT (OUTPATIENT)
Dept: PHYSICAL THERAPY | Age: 38
End: 2018-09-18
Payer: COMMERCIAL

## 2018-09-20 ENCOUNTER — HOSPITAL ENCOUNTER (OUTPATIENT)
Dept: PHYSICAL THERAPY | Age: 38
Setting detail: THERAPIES SERIES
Discharge: HOME | End: 2018-09-20
Payer: COMMERCIAL

## 2018-09-20 PROCEDURE — 97110 THERAPEUTIC EXERCISES: CPT | Performed by: PHYSICAL THERAPIST

## 2018-09-20 PROCEDURE — 97530 THERAPEUTIC ACTIVITIES: CPT | Performed by: PHYSICAL THERAPIST

## 2018-09-20 PROCEDURE — G8978 MOBILITY CURRENT STATUS: HCPCS | Performed by: PHYSICAL THERAPIST

## 2018-09-20 PROCEDURE — G8979 MOBILITY GOAL STATUS: HCPCS | Performed by: PHYSICAL THERAPIST

## 2018-09-20 NOTE — PLAN OF CARE
Insurance/Certification information:  PT Insurance Information: [employer]   Physician Information:  Referring Practitioner: Dr. Catalan Line of care signed (Y/N):     Date of Patient follow up with Physician: 9/11/18    G-Code (if applicable):      Date G-Code Applied:  8/21/18  PT G-Codes  Functional Assessment Tool Used: LEFS  Score: 57/80 - 28.75% deficit   Functional Limitation: Mobility: Walking and moving around  Mobility: Walking and Moving Around Current Status (): At least 20 percent but less than 40 percent impaired, limited or restricted  Mobility: Walking and Moving Around Goal Status (): At least 1 percent but less than 20 percent impaired, limited or restricted    Progress Note: []  Yes  []  No  Next due by: Visit #10       Latex Allergy:  [x]NO      []YES  Preferred Language for Healthcare:   [x]English       []other:    Visit # Insurance Allowable   7  (9/20) 20   [employer]    7 visits approved through 10/5     Pain level:  1-2/10 (9/20)     SUBJECTIVE:  Got a cortisone injection which has helped to decrease symptoms. She still notices pain with stairs and after long shifts at work.   9/20    OBJECTIVE: See eval  Observation:   Test measurements:  Flexibility 9/20 L R Comment   Hamstring 90 deg 90 deg  Passive SLR    Gastroc Mod restriction Mod restriction      ITB -  + min     Quad Min restriction  Min restriction                              ROM 9/20 PROM AROM Overpressure Comment     L R L R L R     Flexion     145 145 (min pain)          Extension     0 0                                                   Strength 9/20 L R Comment   Quad Good      5 Good      4+ pain      Hamstring 4+ 4+     Gastroc 5 5     Hip  flexion 4+ 4+      Hip abd 4+ 4 min pain                                    RESTRICTIONS/PRECAUTIONS:     Exercises/Interventions:     Bike 5' warm up     Exercise/Equipment Resistance/Repetitions Other comments   Stretching     Hamstring 5x30\"     [address]  [facility] Calf 5x30\"     Hip Flexion     ITB w/ strap  5x30\"  Added 8/23   Groin     Quad                    SLR     Supine 3x10 1.5# ^ 9/20   Abduction 3x10 1.5# ^ 9/20   Adduction 3x10 1.5# ^ 9/20   Prone     SLR+ 5x15\"   Added 9/4        Isometrics     Quad sets 10x10\"          Patellar Glides     Medial     Superior     Inferior          ROM        Hang Weights     Passive     Active     Weight Shift     Ankle Pumps                    CKC     Calf raises     Wall sits 5x30\" blue band  ^ 9/20   Step ups  L2 3x10  Added 9/7   1 leg stand 5x30\" add Aero npv  Added 9/4   Band walks Lateral 3x blue band  Added 9/7   CC TKE Silver 3x10   ^ 9/4   bridges 3x10 add Swiss ball npv  Added 8/23        PRE     Extension  RANGE:   Flexion 3x10 1.5#  RANGE: avail ^ 9/20        Quantum machines     Leg press  npv     Leg extension     Leg curl          Manual interventions                   Therapeutic Exercise and NMR EXR  [x] (49182) Provided verbal/tactile cueing for activities related to strengthening, flexibility, endurance, ROM for improvements in LE, proximal hip, and core control with self care, mobility, lifting, ambulation.  [] (09833) Provided verbal/tactile cueing for activities related to improving balance, coordination, kinesthetic sense, posture, motor skill, proprioception  to assist with LE, proximal hip, and core control in self care, mobility, lifting, ambulation and eccentric single leg control.      NMR and Therapeutic Activities:    [x] (71469 or 19871) Provided verbal/tactile cueing for activities related to improving balance, coordination, kinesthetic sense, posture, motor skill, proprioception and motor activation to allow for proper function of core, proximal hip and LE with self care and ADLs  [] (87168) Gait Re-education- Provided training and instruction to the patient for proper LE, core and proximal hip recruitment and positioning and eccentric body weight control with ambulation re-education including up and down stairs     Home Exercise Program:    [x] (13006) Reviewed/Progressed HEP activities related to strengthening, flexibility, endurance, ROM of core, proximal hip and LE for functional self-care, mobility, lifting and ambulation/stair navigation   [] (42454)Reviewed/Progressed HEP activities related to improving balance, coordination, kinesthetic sense, posture, motor skill, proprioception of core, proximal hip and LE for self care, mobility, lifting, and ambulation/stair navigation      Manual Treatments:  PROM / STM / Oscillations-Mobs:  G-I, II, III, IV (PA's, Inf., Post.)  [] (03703) Provided manual therapy to mobilize LE, proximal hip and/or LS spine soft tissue/joints for the purpose of modulating pain, promoting relaxation,  increasing ROM, reducing/eliminating soft tissue swelling/inflammation/restriction, improving soft tissue extensibility and allowing for proper ROM for normal function with self care, mobility, lifting and ambulation. Modalities:      Charges:  Timed Code Treatment Minutes: 40   Total Treatment Minutes: 095-130       [] EVAL (LOW) 72981 (typically 20 minutes face-to-face)  [] EVAL (MOD) 61114 (typically 30 minutes face-to-face)  [] EVAL (HIGH) 86778 (typically 45 minutes face-to-face)  [] RE-EVAL   [x] RP(86707) x  2   [] IONTO  [] NMR (66090) x      [] VASO  [] Manual (45128) x       [] Other:  [x] TA x  1    [] Mech Traction (66655)  [] ES(attended) (91726)      [] ES (un) (16699):     GOALS:   Patient stated goal: Get rid of pain; work without limitations     Therapist goals for Patient:   Short Term Goals: To be achieved in: 2 weeks  1. Independent in HEP and progression per patient tolerance, in order to prevent re-injury. met  2. Patient will have a decrease in pain to facilitate improvement in movement, function, and ADLs as indicated by Functional Deficits. met    Long Term Goals: To be achieved in: 6-8 weeks  1.  Disability index score of 5% or less for the LEFS to

## 2018-09-27 ENCOUNTER — APPOINTMENT (OUTPATIENT)
Dept: PHYSICAL THERAPY | Age: 38
End: 2018-09-27
Payer: COMMERCIAL

## 2019-01-08 ENCOUNTER — OFFICE VISIT (OUTPATIENT)
Dept: FAMILY MEDICINE CLINIC | Age: 39
End: 2019-01-08
Payer: COMMERCIAL

## 2019-01-08 ENCOUNTER — TELEPHONE (OUTPATIENT)
Dept: FAMILY MEDICINE CLINIC | Age: 39
End: 2019-01-08

## 2019-01-08 VITALS
SYSTOLIC BLOOD PRESSURE: 124 MMHG | BODY MASS INDEX: 30.11 KG/M2 | HEART RATE: 81 BPM | DIASTOLIC BLOOD PRESSURE: 72 MMHG | WEIGHT: 170 LBS

## 2019-01-08 DIAGNOSIS — M54.2 NECK PAIN: Primary | ICD-10-CM

## 2019-01-08 DIAGNOSIS — M25.551 PAIN OF RIGHT HIP JOINT: ICD-10-CM

## 2019-01-08 PROCEDURE — 99214 OFFICE O/P EST MOD 30 MIN: CPT | Performed by: FAMILY MEDICINE

## 2019-01-08 RX ORDER — MELOXICAM 15 MG/1
15 TABLET ORAL DAILY
Qty: 30 TABLET | Refills: 3 | Status: SHIPPED | OUTPATIENT
Start: 2019-01-08 | End: 2020-03-04

## 2019-01-09 DIAGNOSIS — M54.2 NECK PAIN: ICD-10-CM

## 2019-01-09 DIAGNOSIS — M25.551 PAIN OF RIGHT HIP JOINT: ICD-10-CM

## 2019-01-09 LAB
ANION GAP SERPL CALCULATED.3IONS-SCNC: 15 MMOL/L (ref 3–16)
BUN BLDV-MCNC: 17 MG/DL (ref 7–20)
C-REACTIVE PROTEIN: 3.7 MG/L (ref 0–5.1)
CALCIUM SERPL-MCNC: 9.1 MG/DL (ref 8.3–10.6)
CHLORIDE BLD-SCNC: 103 MMOL/L (ref 99–110)
CHOLESTEROL, TOTAL: 206 MG/DL (ref 0–199)
CO2: 24 MMOL/L (ref 21–32)
CREAT SERPL-MCNC: 0.6 MG/DL (ref 0.6–1.1)
GFR AFRICAN AMERICAN: >60
GFR NON-AFRICAN AMERICAN: >60
GLUCOSE BLD-MCNC: 78 MG/DL (ref 70–99)
HCT VFR BLD CALC: 44.3 % (ref 36–48)
HDLC SERPL-MCNC: 39 MG/DL (ref 40–60)
HEMOGLOBIN: 14.8 G/DL (ref 12–16)
LDL CHOLESTEROL CALCULATED: 143 MG/DL
MCH RBC QN AUTO: 28.5 PG (ref 26–34)
MCHC RBC AUTO-ENTMCNC: 33.5 G/DL (ref 31–36)
MCV RBC AUTO: 85.2 FL (ref 80–100)
PDW BLD-RTO: 13.3 % (ref 12.4–15.4)
PLATELET # BLD: 258 K/UL (ref 135–450)
PMV BLD AUTO: 8 FL (ref 5–10.5)
POTASSIUM SERPL-SCNC: 4.4 MMOL/L (ref 3.5–5.1)
RBC # BLD: 5.2 M/UL (ref 4–5.2)
SEDIMENTATION RATE, ERYTHROCYTE: 9 MM/HR (ref 0–20)
SODIUM BLD-SCNC: 142 MMOL/L (ref 136–145)
TRIGL SERPL-MCNC: 119 MG/DL (ref 0–150)
TSH SERPL DL<=0.05 MIU/L-ACNC: 1.53 UIU/ML (ref 0.27–4.2)
VLDLC SERPL CALC-MCNC: 24 MG/DL
WBC # BLD: 8.2 K/UL (ref 4–11)

## 2019-01-09 PROCEDURE — 36415 COLL VENOUS BLD VENIPUNCTURE: CPT | Performed by: FAMILY MEDICINE

## 2019-01-11 ASSESSMENT — ENCOUNTER SYMPTOMS
EYES NEGATIVE: 1
BACK PAIN: 0
RESPIRATORY NEGATIVE: 1
GASTROINTESTINAL NEGATIVE: 1

## 2019-02-14 ENCOUNTER — TELEPHONE (OUTPATIENT)
Dept: FAMILY MEDICINE CLINIC | Age: 39
End: 2019-02-14

## 2019-04-03 ENCOUNTER — NURSE TRIAGE (OUTPATIENT)
Dept: OTHER | Facility: CLINIC | Age: 39
End: 2019-04-03

## 2019-04-03 NOTE — TELEPHONE ENCOUNTER
Reason for Disposition   Constant abdominal pain lasting > 2 hours    Protocols used: ABDOMINAL PAIN - UPPER-ADULT-OH    Received call from [address] in [facility] [address] [place] [address]. Patient with hx IBS. Has been having left upper abdominal pain for a week. Sometimes mild, other times 6-7/10 pain. Last bowel movement 2 days ago which is normal for her. Denies diarrhea or vomiting. Denies chest pain or shortness of breath. She does not want to go to the ER. \"the ER is not an option\". Call soft transferred to PCP office to discuss. No provider available. Requested a message be sent to the first available provider since patient does not want to go to ER. Rec to patient that she proceed to ER since no provider available. She states she will call the doctor's office again shortly so see if provider can see in the office.

## 2019-07-30 RX ORDER — RIZATRIPTAN BENZOATE 10 MG/1
10 TABLET ORAL
Qty: 12 TABLET | Refills: 2 | Status: SHIPPED | OUTPATIENT
Start: 2019-07-30 | End: 2019-12-03 | Stop reason: SDUPTHER

## 2019-08-20 ENCOUNTER — OFFICE VISIT (OUTPATIENT)
Dept: PRIMARY CARE CLINIC | Age: 39
End: 2019-08-20
Payer: COMMERCIAL

## 2019-08-20 VITALS
WEIGHT: 148 LBS | SYSTOLIC BLOOD PRESSURE: 118 MMHG | OXYGEN SATURATION: 98 % | HEART RATE: 69 BPM | DIASTOLIC BLOOD PRESSURE: 75 MMHG | BODY MASS INDEX: 26.22 KG/M2 | RESPIRATION RATE: 14 BRPM

## 2019-08-20 DIAGNOSIS — Z01.818 PRE-OP EXAMINATION: ICD-10-CM

## 2019-08-20 DIAGNOSIS — G57.52 TARSAL TUNNEL SYNDROME OF LEFT SIDE: ICD-10-CM

## 2019-08-20 DIAGNOSIS — Z01.818 PRE-OP EXAMINATION: Primary | ICD-10-CM

## 2019-08-20 DIAGNOSIS — F31.70 BIPOLAR DISORDER IN FULL REMISSION, MOST RECENT EPISODE UNSPECIFIED TYPE (HCC): ICD-10-CM

## 2019-08-20 DIAGNOSIS — M72.2 PLANTAR FASCIITIS: ICD-10-CM

## 2019-08-20 DIAGNOSIS — G43.709 CHRONIC MIGRAINE WITHOUT AURA WITHOUT STATUS MIGRAINOSUS, NOT INTRACTABLE: ICD-10-CM

## 2019-08-20 LAB
ANION GAP SERPL CALCULATED.3IONS-SCNC: 16 MMOL/L (ref 3–16)
BUN BLDV-MCNC: 15 MG/DL (ref 7–20)
CALCIUM SERPL-MCNC: 9.6 MG/DL (ref 8.3–10.6)
CHLORIDE BLD-SCNC: 102 MMOL/L (ref 99–110)
CO2: 24 MMOL/L (ref 21–32)
CREAT SERPL-MCNC: 0.6 MG/DL (ref 0.6–1.1)
GFR AFRICAN AMERICAN: >60
GFR NON-AFRICAN AMERICAN: >60
GLUCOSE BLD-MCNC: 77 MG/DL (ref 70–99)
HCT VFR BLD CALC: 42.9 % (ref 36–48)
HEMOGLOBIN: 14.8 G/DL (ref 12–16)
MCH RBC QN AUTO: 29.1 PG (ref 26–34)
MCHC RBC AUTO-ENTMCNC: 34.5 G/DL (ref 31–36)
MCV RBC AUTO: 84.4 FL (ref 80–100)
PDW BLD-RTO: 13 % (ref 12.4–15.4)
PLATELET # BLD: 235 K/UL (ref 135–450)
PMV BLD AUTO: 8.4 FL (ref 5–10.5)
POTASSIUM SERPL-SCNC: 4.1 MMOL/L (ref 3.5–5.1)
RBC # BLD: 5.09 M/UL (ref 4–5.2)
SODIUM BLD-SCNC: 142 MMOL/L (ref 136–145)
WBC # BLD: 7 K/UL (ref 4–11)

## 2019-08-20 PROCEDURE — 99243 OFF/OP CNSLTJ NEW/EST LOW 30: CPT | Performed by: FAMILY MEDICINE

## 2019-08-20 NOTE — PROGRESS NOTES
Breastfeeding? No   BMI 26.22 kg/m²   Physical Exam   Constitutional: She is oriented to person, place, and time. She appears well-developed and well-nourished. HENT:   Head: Normocephalic. Right Ear: External ear normal.   Left Ear: External ear normal.   Mouth/Throat: Oropharynx is clear and moist.   Eyes: Conjunctivae are normal. No scleral icterus. Neck: Normal range of motion. Neck supple. No JVD present. No thyromegaly present. Cardiovascular: Normal rate, regular rhythm, normal heart sounds and intact distal pulses. Exam reveals no gallop and no friction rub. No murmur heard. Pulmonary/Chest: Effort normal and breath sounds normal. She has no wheezes. She has no rales. Abdominal: Soft. Bowel sounds are normal. She exhibits no distension and no mass. There is no hepatosplenomegaly. There is no tenderness. No hernia. Musculoskeletal: She exhibits no edema. Lymphadenopathy:     She has no cervical adenopathy. Neurological: She is alert and oriented to person, place, and time. Skin: No rash noted. Psychiatric: She has a normal mood and affect. Vitals reviewed. ASSESSMENT/PLAN:   Diagnosis Orders   1. Pre-op examination  CBC    Basic Metabolic Panel   2. Plantar fasciitis     3. Tarsal tunnel syndrome of left side     4. Bipolar disorder in full remission, most recent episode unspecified type ([facility] [address]     5. Chronic migraine without aura without status migrainosus, not intractable         1. Discussed the risk of surgery including bleeding and incfectin,and the risks of general anesthetic including MI, CVA, sudden death or even reaction to anesthetic medications. The patient understands the risks, any and all questions were answered to the patient's satisfaction.   2. Okay for the planned surgery        Date of Surgery Update (To be completed by Attending Surgeon day of surgery.)  Copy isgiven to the pt and one is faxed to the surgeon

## 2019-11-10 ENCOUNTER — PATIENT MESSAGE (OUTPATIENT)
Dept: PRIMARY CARE CLINIC | Age: 39
End: 2019-11-10

## 2019-11-26 ENCOUNTER — TELEPHONE (OUTPATIENT)
Dept: PRIMARY CARE CLINIC | Age: 39
End: 2019-11-26

## 2019-12-03 ENCOUNTER — OFFICE VISIT (OUTPATIENT)
Dept: PRIMARY CARE CLINIC | Age: 39
End: 2019-12-03
Payer: COMMERCIAL

## 2019-12-03 VITALS
BODY MASS INDEX: 26.68 KG/M2 | OXYGEN SATURATION: 98 % | RESPIRATION RATE: 12 BRPM | SYSTOLIC BLOOD PRESSURE: 109 MMHG | HEART RATE: 76 BPM | DIASTOLIC BLOOD PRESSURE: 77 MMHG | WEIGHT: 150.6 LBS

## 2019-12-03 DIAGNOSIS — Z02.1 PRE-EMPLOYMENT EXAMINATION: Primary | ICD-10-CM

## 2019-12-03 DIAGNOSIS — Z23 NEED FOR INFLUENZA VACCINATION: ICD-10-CM

## 2019-12-03 PROCEDURE — 90471 IMMUNIZATION ADMIN: CPT | Performed by: FAMILY MEDICINE

## 2019-12-03 PROCEDURE — 99395 PREV VISIT EST AGE 18-39: CPT | Performed by: FAMILY MEDICINE

## 2019-12-03 PROCEDURE — 90686 IIV4 VACC NO PRSV 0.5 ML IM: CPT | Performed by: FAMILY MEDICINE

## 2019-12-03 RX ORDER — ARIPIPRAZOLE 10 MG/1
10 TABLET ORAL DAILY
COMMUNITY
Start: 2019-11-20 | End: 2020-03-04

## 2019-12-03 RX ORDER — OLANZAPINE 5 MG/1
5 TABLET ORAL DAILY
COMMUNITY
Start: 2019-11-19 | End: 2022-05-13

## 2019-12-03 RX ORDER — CLONAZEPAM 1 MG/1
1 TABLET ORAL DAILY
COMMUNITY
End: 2020-03-04

## 2019-12-03 RX ORDER — M-VIT,TX,IRON,MINS/CALC/FOLIC 27MG-0.4MG
1 TABLET ORAL DAILY
COMMUNITY
End: 2022-05-24

## 2019-12-03 RX ORDER — RIZATRIPTAN BENZOATE 10 MG/1
10 TABLET ORAL
Qty: 12 TABLET | Refills: 2 | Status: SHIPPED | OUTPATIENT
Start: 2019-12-03 | End: 2020-10-13 | Stop reason: SDUPTHER

## 2019-12-03 RX ORDER — METHYLDOPA/HYDROCHLOROTHIAZIDE 250MG-25MG
2 TABLET ORAL DAILY
COMMUNITY
End: 2021-07-12

## 2019-12-03 RX ORDER — ASCORBIC ACID 500 MG
500 TABLET ORAL DAILY
COMMUNITY
End: 2022-05-13

## 2019-12-03 ASSESSMENT — ENCOUNTER SYMPTOMS
EYES NEGATIVE: 1
ALLERGIC/IMMUNOLOGIC NEGATIVE: 1
GASTROINTESTINAL NEGATIVE: 1
RESPIRATORY NEGATIVE: 1

## 2020-03-04 ENCOUNTER — OFFICE VISIT (OUTPATIENT)
Dept: PRIMARY CARE CLINIC | Age: 40
End: 2020-03-04
Payer: COMMERCIAL

## 2020-03-04 VITALS
BODY MASS INDEX: 28.95 KG/M2 | SYSTOLIC BLOOD PRESSURE: 114 MMHG | HEART RATE: 81 BPM | TEMPERATURE: 98 F | OXYGEN SATURATION: 99 % | DIASTOLIC BLOOD PRESSURE: 64 MMHG | WEIGHT: 163.4 LBS

## 2020-03-04 DIAGNOSIS — M25.50 ARTHRALGIA, UNSPECIFIED JOINT: ICD-10-CM

## 2020-03-04 PROCEDURE — 99214 OFFICE O/P EST MOD 30 MIN: CPT | Performed by: FAMILY MEDICINE

## 2020-03-04 RX ORDER — METHYLPREDNISOLONE 4 MG/1
TABLET ORAL
Qty: 1 KIT | Refills: 0 | Status: SHIPPED | OUTPATIENT
Start: 2020-03-04 | End: 2020-03-10

## 2020-03-04 RX ORDER — LAMOTRIGINE 25 MG/1
TABLET ORAL
COMMUNITY
Start: 2020-02-25 | End: 2020-10-13

## 2020-03-04 RX ORDER — MELOXICAM 15 MG/1
15 TABLET ORAL DAILY
Qty: 30 TABLET | Refills: 1 | Status: SHIPPED | OUTPATIENT
Start: 2020-03-04 | End: 2020-10-13

## 2020-03-04 SDOH — ECONOMIC STABILITY: INCOME INSECURITY: HOW HARD IS IT FOR YOU TO PAY FOR THE VERY BASICS LIKE FOOD, HOUSING, MEDICAL CARE, AND HEATING?: NOT HARD AT ALL

## 2020-03-04 SDOH — ECONOMIC STABILITY: TRANSPORTATION INSECURITY
IN THE PAST 12 MONTHS, HAS THE LACK OF TRANSPORTATION KEPT YOU FROM MEDICAL APPOINTMENTS OR FROM GETTING MEDICATIONS?: NO

## 2020-03-04 SDOH — ECONOMIC STABILITY: TRANSPORTATION INSECURITY
IN THE PAST 12 MONTHS, HAS LACK OF TRANSPORTATION KEPT YOU FROM MEETINGS, WORK, OR FROM GETTING THINGS NEEDED FOR DAILY LIVING?: NO

## 2020-03-04 SDOH — ECONOMIC STABILITY: FOOD INSECURITY: WITHIN THE PAST 12 MONTHS, THE FOOD YOU BOUGHT JUST DIDN'T LAST AND YOU DIDN'T HAVE MONEY TO GET MORE.: NEVER TRUE

## 2020-03-04 SDOH — ECONOMIC STABILITY: FOOD INSECURITY: WITHIN THE PAST 12 MONTHS, YOU WORRIED THAT YOUR FOOD WOULD RUN OUT BEFORE YOU GOT MONEY TO BUY MORE.: NEVER TRUE

## 2020-03-05 LAB
A/G RATIO: 2 (ref 1.1–2.2)
ALBUMIN SERPL-MCNC: 4.9 G/DL (ref 3.4–5)
ALP BLD-CCNC: 90 U/L (ref 40–129)
ALT SERPL-CCNC: 16 U/L (ref 10–40)
ANION GAP SERPL CALCULATED.3IONS-SCNC: 15 MMOL/L (ref 3–16)
ANTI-NUCLEAR ANTIBODY (ANA): NEGATIVE
AST SERPL-CCNC: 17 U/L (ref 15–37)
BASOPHILS ABSOLUTE: 0 K/UL (ref 0–0.2)
BASOPHILS RELATIVE PERCENT: 0.3 %
BILIRUB SERPL-MCNC: 0.3 MG/DL (ref 0–1)
BUN BLDV-MCNC: 23 MG/DL (ref 7–20)
C-REACTIVE PROTEIN: 1.5 MG/L (ref 0–5.1)
CALCIUM SERPL-MCNC: 10.1 MG/DL (ref 8.3–10.6)
CHLORIDE BLD-SCNC: 102 MMOL/L (ref 99–110)
CO2: 25 MMOL/L (ref 21–32)
CREAT SERPL-MCNC: 0.7 MG/DL (ref 0.6–1.1)
EOSINOPHILS ABSOLUTE: 0.2 K/UL (ref 0–0.6)
EOSINOPHILS RELATIVE PERCENT: 2 %
GFR AFRICAN AMERICAN: >60
GFR NON-AFRICAN AMERICAN: >60
GLOBULIN: 2.4 G/DL
GLUCOSE BLD-MCNC: 98 MG/DL (ref 70–99)
HCT VFR BLD CALC: 39.6 % (ref 36–48)
HEMOGLOBIN: 13.4 G/DL (ref 12–16)
LYMPHOCYTES ABSOLUTE: 1.9 K/UL (ref 1–5.1)
LYMPHOCYTES RELATIVE PERCENT: 16.7 %
MCH RBC QN AUTO: 28.6 PG (ref 26–34)
MCHC RBC AUTO-ENTMCNC: 33.8 G/DL (ref 31–36)
MCV RBC AUTO: 84.7 FL (ref 80–100)
MONOCYTES ABSOLUTE: 0.8 K/UL (ref 0–1.3)
MONOCYTES RELATIVE PERCENT: 7.1 %
NEUTROPHILS ABSOLUTE: 8.5 K/UL (ref 1.7–7.7)
NEUTROPHILS RELATIVE PERCENT: 73.9 %
PDW BLD-RTO: 13.1 % (ref 12.4–15.4)
PLATELET # BLD: 253 K/UL (ref 135–450)
PMV BLD AUTO: 8.1 FL (ref 5–10.5)
POTASSIUM SERPL-SCNC: 4 MMOL/L (ref 3.5–5.1)
RBC # BLD: 4.67 M/UL (ref 4–5.2)
RHEUMATOID FACTOR: <10 IU/ML
SEDIMENTATION RATE, ERYTHROCYTE: 11 MM/HR (ref 0–20)
SODIUM BLD-SCNC: 142 MMOL/L (ref 136–145)
TOTAL PROTEIN: 7.3 G/DL (ref 6.4–8.2)
WBC # BLD: 11.5 K/UL (ref 4–11)

## 2020-03-06 ASSESSMENT — ENCOUNTER SYMPTOMS
TROUBLE SWALLOWING: 0
CHEST TIGHTNESS: 0
DIARRHEA: 0
VOMITING: 0
ALLERGIC/IMMUNOLOGIC NEGATIVE: 1
EYES NEGATIVE: 1
SORE THROAT: 0
SHORTNESS OF BREATH: 0
NAUSEA: 0
RHINORRHEA: 0
COUGH: 0
WHEEZING: 0
ABDOMINAL PAIN: 0

## 2020-03-09 ENCOUNTER — TELEPHONE (OUTPATIENT)
Dept: PRIMARY CARE CLINIC | Age: 40
End: 2020-03-09

## 2020-03-11 ENCOUNTER — TELEPHONE (OUTPATIENT)
Dept: PRIMARY CARE CLINIC | Age: 40
End: 2020-03-11

## 2020-04-26 ENCOUNTER — PATIENT MESSAGE (OUTPATIENT)
Dept: PRIMARY CARE CLINIC | Age: 40
End: 2020-04-26

## 2020-04-27 NOTE — TELEPHONE ENCOUNTER
From: Danette Clark  To: Fernando Nicole MD  Sent: 4/26/2020 4:39 AM EDT  Subject: Non-Urgent Medical Question    I was wondering if I could do an e visit for the following or if I need to come in so you can see me. I have gained weight back and am on a bipolar medicine that contributed to that and I'm worried that it will continue. I am weighing around 170lbs and I am 5ft 3in. I am worried about the impact this could have on my overall health. I am trying to walk as much as I can and be active. I admit I need to work on my eating, this pandemic hasn't helped my mood and has contributed to my bad eating habits. I was wondering if there is any medication that may be able to help me at least start some weight loss I feel that being on abilify lessens my chances of losing on my own. Thank you for your time. Have a blessed day. 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          Chief Complaint   Patient presents with   • Follow-up       aortic stenosis, aortic inuffficiency         PROBLEM LIST:   1. Bicuspid aortic valve with combined aortic stenosis/aortic insufficiency.  a. In 2005, remote onset of auscultated murmur by primary care physician with ELVIRA per Dr. Ortiz with bicuspid findings.   b. 9/16 #25 AVR On X Dacron tube  graft & root replacement  w coronary reimplantation-Dr Bazzi  1. periop tamponade requiring window  c. 11/17 echo acceptable AVR , EF 60%  d. 11/18 echo ef 60%, concentric LVH, nl function mechanical AV  2. Atypical chest pain.  a. On 4/16/2012, GXT stress test revealing 12.8 MET exercise. T-wave inversion in inferior leads and in leads 4 - leads 6 and no exercise induced arrhythmias.  3. Palpitations.   a. On 4/17/2012, 24-hour Holter monitor revealing heart rate ranging from  beats per minute with 56 PVCs, no pauses.  4. Childhood asthma.  5. Surgical history: None.  6. G2, P3      Chief Complaint   Patient presents with   • echo     Allergies  Allergies   Allergen Reactions   • Flagyl [Metronidazole] Itching       Current Medications    Current Outpatient Medications:   •  Loratadine (CLARITIN) 10 MG capsule, Take  by mouth As Needed (allergies)., Disp: , Rfl:   •  Multiple Vitamins-Minerals (MULTI ADULT GUMMIES PO), Take  by mouth Daily., Disp: , Rfl:   •  warfarin (COUMADIN) 2.5 MG tablet, TAKE ONE TABLET BY MOUTH EVERY DAY AS DIRECTED FOR BLOOD THINNER, Disp: 30 tablet, Rfl: 3  •  warfarin (COUMADIN) 5 MG tablet, Take 1-1.5 tablets by mouth daily or as directed by the anticoagulation pharmacist, Disp: 135 tablet, Rfl: 0    History of Present Illness     Pt is here for follow up. She had an  "echo today. Pt has no complaints, denies any CP, dyspnea, palpitations.  Does some work out videos through [employer] at home 4x weekly. Labs pr pcp recently showed borderline TGs and glucose, data deficient    ROS:  All systems have been reviewed and are negative with the exception of those mentioned in the HPI.    OBJECTIVE:  Vitals:    11/19/18 0957   BP: 126/70   BP Location: Right arm   Patient Position: Sitting   Pulse: 88   SpO2: 97%   Weight: 80.6 kg (177 lb 9.6 oz)   Height: 157.5 cm (62\")     Physical Exam   Constitutional: She appears well-developed and well-nourished.   Neck: Normal range of motion. Neck supple. No hepatojugular reflux and no JVD present. Carotid bruit is not present. No tracheal deviation present. No thyromegaly present.   Cardiovascular: Normal rate, regular rhythm, S1 normal, S2 normal, intact distal pulses and normal pulses. PMI is not displaced. Exam reveals no gallop, no distant heart sounds, no friction rub, no midsystolic click and no opening snap.   No murmur heard.  Pulses:       Radial pulses are 2+ on the right side, and 2+ on the left side.        Dorsalis pedis pulses are 2+ on the right side, and 2+ on the left side.        Posterior tibial pulses are 2+ on the right side, and 2+ on the left side.   Crisp click   Pulmonary/Chest: Effort normal and breath sounds normal. She has no wheezes. She has no rales.   Abdominal: Soft. Bowel sounds are normal. She exhibits no mass. There is no tenderness. There is no guarding.       Diagnostic Data:  Procedures      ASSESSMENT:  No diagnosis found.    PLAN:  Bicuspid AV /asc ao aneurysm post repair w nl lvef. Echo today acceptable    Encouraged to continue to exercise    Pt counseled that elevated TGs can correspond to the beginnings of insulin resistance.  Counseled on limiting white starch rich foods such as bread, pasta, potatoes, sweets.    Fu yearly      Hans Mary PA, thank you for referring Ms. Queen for " evaluation.  I have forwarded my electronically generated recommendations to you for review.  Please do not hesitate to call with any questions.    Scribed for Niels Klein MD by Azul Rivera PA-C. 11/19/2018  10:11 AM   I, Niels Klein MD, personally performed the services described in this documentation as scribed by the above named individual in my presence, and it is both accurate and complete.  11/19/2018  10:25 AM    Niels Klein MD, FACC

## 2020-04-30 ENCOUNTER — VIRTUAL VISIT (OUTPATIENT)
Dept: PRIMARY CARE CLINIC | Age: 40
End: 2020-04-30
Payer: COMMERCIAL

## 2020-04-30 VITALS — BODY MASS INDEX: 30.82 KG/M2 | WEIGHT: 174 LBS

## 2020-04-30 PROCEDURE — 99213 OFFICE O/P EST LOW 20 MIN: CPT | Performed by: FAMILY MEDICINE

## 2020-04-30 ASSESSMENT — ENCOUNTER SYMPTOMS
RESPIRATORY NEGATIVE: 1
GASTROINTESTINAL NEGATIVE: 1
EYES NEGATIVE: 1

## 2020-04-30 NOTE — PROGRESS NOTES
911 if deemed necessary. Patient identification was verified at the start of the visit: Yes    Total time spent on this encounter: about 20 minutes spent counseling the pt about  her weigh    Services were provided through a video synchronous discussion virtually to substitute for in-person clinic visit. Patient and provider were located at their individual homes. --Chrissy Alford MD on 4/30/2020 at 10:22 AM    An electronic signature was used to authenticate this note.

## 2020-08-11 ENCOUNTER — TELEPHONE (OUTPATIENT)
Dept: PRIMARY CARE CLINIC | Age: 40
End: 2020-08-11

## 2020-08-11 NOTE — TELEPHONE ENCOUNTER
Zeny from [facility] is calling. She is just giving Dr. Autumn Howard an Mari Torres. The pt was admitted to the hospital because she was suicidal with a plan.     LOV 4/30/20

## 2020-10-13 ENCOUNTER — VIRTUAL VISIT (OUTPATIENT)
Dept: PRIMARY CARE CLINIC | Age: 40
End: 2020-10-13
Payer: COMMERCIAL

## 2020-10-13 PROCEDURE — 99213 OFFICE O/P EST LOW 20 MIN: CPT | Performed by: FAMILY MEDICINE

## 2020-10-13 RX ORDER — ZIPRASIDONE HYDROCHLORIDE 40 MG/1
1 CAPSULE ORAL 2 TIMES DAILY
COMMUNITY
Start: 2020-09-12 | End: 2021-07-12

## 2020-10-13 RX ORDER — TOPIRAMATE 100 MG/1
1 TABLET, FILM COATED ORAL 2 TIMES DAILY
COMMUNITY
Start: 2020-09-17 | End: 2022-05-13

## 2020-10-13 RX ORDER — ARIPIPRAZOLE 15 MG/1
1 TABLET ORAL DAILY
COMMUNITY
Start: 2020-09-17 | End: 2021-07-12

## 2020-10-13 RX ORDER — RIZATRIPTAN BENZOATE 10 MG/1
10 TABLET ORAL
Qty: 12 TABLET | Refills: 2 | Status: SHIPPED | OUTPATIENT
Start: 2020-10-13 | End: 2021-04-09 | Stop reason: SDUPTHER

## 2020-10-13 ASSESSMENT — ENCOUNTER SYMPTOMS
RESPIRATORY NEGATIVE: 1
EYES NEGATIVE: 1
GASTROINTESTINAL NEGATIVE: 1

## 2020-10-13 NOTE — PROGRESS NOTES
needed Yes London Cuenca MD   ziprasidone (GEODON) 40 MG capsule Take 1 capsule by mouth 2 times daily  Historical Provider, MD   ARIPiprazole (ABILIFY) 15 MG tablet Take 1 tablet by mouth daily  Historical Provider, MD   topiramate (TOPAMAX) 100 MG tablet Take 1 tablet by mouth 2 times daily  Historical Provider, MD   lamoTRIgine (LAMICTAL) 25 MG tablet TAKE TWO TABLETS BY MOUTH TWICE A DAY  Historical Provider, MD   meloxicam (MOBIC) 15 MG tablet Take 1 tablet by mouth daily  Patient not taking: Reported on 4/30/2020  Cole Darden MD   OLANZapine (ZYPREXA) 5 MG tablet Take 5 mg by mouth daily  Historical Provider, MD   vitamin C (ASCORBIC ACID) 500 MG tablet Take 500 mg by mouth daily  Historical Provider, MD   Echinacea 125 MG CAPS Take 2 capsules by mouth daily  Historical Provider, MD       Social History     Tobacco Use    Smoking status: Never Smoker    Smokeless tobacco: Never Used   Substance Use Topics    Alcohol use: No     Alcohol/week: 0.0 standard drinks    Drug use: No        Allergies   Allergen Reactions    Ultram [Tramadol Hcl]      Hives    Vicodin [Hydrocodone-Acetaminophen] Nausea And Vomiting   ,   Past Medical History:   Diagnosis Date    Arthritis     [facility]ipolar [facility] disorder [facility]     Borderline personality disorder ([facility]     IBS (irritable bowel syndrome)     Neck pain     Post hysterectomy menopause syndrome    ,   Past Surgical History:   Procedure Laterality Date    APPENDECTOMY  2007    CHOLECYSTECTOMY  2007    KNEE ARTHROSCOPY Right 2010    torn meniscus    KNEE SURGERY Right 2005    Knee cap    OVARY REMOVAL Right     PCOS    SINUS SURGERY  2000    YENNIFER AND BSO  2008    ovarian cyst, adenomyosis- benign    TUBAL LIGATION  2005    TYMPANOSTOMY TUBE PLACEMENT Bilateral     x 3    ,   Social History     Tobacco Use    Smoking status: Never Smoker    Smokeless tobacco: Never Used   Substance Use Topics    Alcohol use: No     Alcohol/week: 0.0 standard drinks    Drug use: No   ,   Family History   Problem Relation Age of Onset    Hypertension Mother     Mental Retardation Father         bipolar    Mental Retardation Sister         bipolar   ,   Immunization History   Administered Date(s) Administered    Hepatitis B 07/09/2012, 08/10/2012, 01/15/2013    Hepatitis B (Recombivax HB) 07/09/2012, 08/10/2012, 01/15/2013    Influenza Vaccine, unspecified formulation 10/24/2010, 10/13/2017    Influenza, High Dose (Fluzone 65 yrs and older) 11/02/2015    Influenza, Intradermal, Preservative free 10/02/2012, 10/31/2013    Influenza, Quadv, IM, PF (6 mo and older Fluzone, Flulaval, Fluarix, and 3 yrs and older Afluria) 12/03/2019    MMR 09/12/1983    PPD Test 04/17/2012, 04/24/2012    Tdap (Boostrix, Adacel) 01/17/2012, 09/01/2017   ,   Health Maintenance   Topic Date Due    Varicella vaccine (1 of 2 - 2-dose childhood series) 04/12/1983    HIV screen  04/12/1997    Flu vaccine (1) 09/01/2020    DTaP/Tdap/Td vaccine (3 - Td) 09/01/2027    Hepatitis A vaccine  Aged Out    Hepatitis B vaccine  Aged Out    Hib vaccine  Aged Out    Meningococcal (ACWY) vaccine  Aged Out    Pneumococcal 0-64 years Vaccine  Aged Out       PHYSICAL EXAMINATION:  [ INSTRUCTIONS:  \"[x]\" Indicates a positive item  \"[]\" Indicates a negative item  -- DELETE ALL ITEMS NOT EXAMINED]  Vital Signs: (As obtained by patient/caregiver or practitioner observation)    Blood pressure-  Heart rate-    Respiratory rate-    Temperature-  Pulse oximetry-     Constitutional: [x] Appears well-developed and well-nourished [x] No apparent distress      [] Abnormal-   Mental status  [x] Alert and awake  [x] Oriented to person/place/time []Able to follow commands      Eyes:  EOM    [x]  Normal  [] Abnormal-  Sclera  [x]  Normal  [] Abnormal -         Discharge []  None visible  [] Abnormal -    HENT:   [x] Normocephalic, atraumatic.   [] Abnormal   [x] Mouth/Throat: Mucous membranes are moist.     External Ears [x] Normal  [] Abnormal-     Neck: [x] No visualized mass     Pulmonary/Chest: [x] Respiratory effort normal.  [] No visualized signs of difficulty breathing or respiratory distress        [] Abnormal-      Musculoskeletal:   [] Normal gait with no signs of ataxia         [] Normal range of motion of neck        [] Abnormal-       Neurological:        [x] No Facial Asymmetry (Cranial nerve 7 motor function) (limited exam to video visit)          [x] No gaze palsy        [] Abnormal-         Skin:        [x] No significant exanthematous lesions or discoloration noted on facial skin         [] Abnormal-            Psychiatric:       [] Normal Affect [] No Hallucinations        [] Abnormal-     Other pertinent observable physical exam findings-     ASSESSMENT/PLAN:   Diagnosis Orders   1. Other migraine without status migrainosus, intractable  topiramate (TOPAMAX) 100 MG tablet    rizatriptan (MAXALT) 10 MG tablet   2. Bipolar 1 disorder (HCC)  ziprasidone (GEODON) 40 MG capsule    ARIPiprazole (ABILIFY) 15 MG tablet    topiramate (TOPAMAX) 100 MG tablet       Danette Bragg is a 47 y.o. female being evaluated by a Virtual Visit (video visit) encounter to address concerns as mentioned above. A caregiver was present when appropriate. Due to this being a TeleHealth encounter (During [facility] public health emergency), evaluation of the following organ systems was limited: Vitals/Constitutional/EENT/Resp/CV/GI//MS/Neuro/Skin/Heme-Lymph-Imm. Pursuant to the emergency declaration under the [facility], [address] [place] [address] authority and the [employer] Act, this Virtual Visit was conducted with patient's (and/or legal guardian's) consent, to reduce the patient's risk of exposure to COVID-19 and provide necessary medical care.   The patient (and/or legal guardian) has also been advised to contact this office for worsening conditions or problems,

## 2020-12-10 ENCOUNTER — TELEPHONE (OUTPATIENT)
Dept: PRIMARY CARE CLINIC | Age: 40
End: 2020-12-10

## 2020-12-10 NOTE — TELEPHONE ENCOUNTER
----- Message from [employer] sent at 12/10/2020  1:53 PM EST -----  Subject: Message to Provider    QUESTIONS  Information for Provider? Pt is scheduled for 8:15 tomorrow (12/11) for a   f/u on her migraines. She mentioned tingling in her face at the end and I   included it in the notes but it just dawned on me that it is a triage   trigger. Please contact her if she needs to be seen today  ---------------------------------------------------------------------------  --------------  CALL BACK INFO  What is the best way for the office to contact you? OK to leave message on   voicemail  Preferred Call Back Phone Number? [phone]  ---------------------------------------------------------------------------  --------------  SCRIPT ANSWERS  Relationship to Patient?  Self

## 2020-12-10 NOTE — TELEPHONE ENCOUNTER
Depend of her symptoms usually typical with her migraine or new symptoms she is experiencing  Any worsening symptoms I do recommend going to the emergency room otherwise we will see her tomorrow

## 2020-12-11 ENCOUNTER — OFFICE VISIT (OUTPATIENT)
Dept: PRIMARY CARE CLINIC | Age: 40
End: 2020-12-11
Payer: COMMERCIAL

## 2020-12-11 VITALS
DIASTOLIC BLOOD PRESSURE: 78 MMHG | TEMPERATURE: 97.8 F | OXYGEN SATURATION: 99 % | HEART RATE: 83 BPM | WEIGHT: 153 LBS | SYSTOLIC BLOOD PRESSURE: 110 MMHG | BODY MASS INDEX: 27.1 KG/M2 | RESPIRATION RATE: 14 BRPM

## 2020-12-11 DIAGNOSIS — G43.819 OTHER MIGRAINE WITHOUT STATUS MIGRAINOSUS, INTRACTABLE: ICD-10-CM

## 2020-12-11 LAB
ALBUMIN SERPL-MCNC: 4.9 G/DL (ref 3.4–5)
ALP BLD-CCNC: 94 U/L (ref 40–129)
ALT SERPL-CCNC: 12 U/L (ref 10–40)
ANION GAP SERPL CALCULATED.3IONS-SCNC: 11 MMOL/L (ref 3–16)
AST SERPL-CCNC: 14 U/L (ref 15–37)
BILIRUB SERPL-MCNC: 0.4 MG/DL (ref 0–1)
BILIRUBIN DIRECT: <0.2 MG/DL (ref 0–0.3)
BILIRUBIN, INDIRECT: ABNORMAL MG/DL (ref 0–1)
BUN BLDV-MCNC: 16 MG/DL (ref 7–20)
C-REACTIVE PROTEIN: 0.9 MG/L (ref 0–5.1)
CALCIUM SERPL-MCNC: 9.5 MG/DL (ref 8.3–10.6)
CHLORIDE BLD-SCNC: 110 MMOL/L (ref 99–110)
CHOLESTEROL, TOTAL: 212 MG/DL (ref 0–199)
CO2: 23 MMOL/L (ref 21–32)
CREAT SERPL-MCNC: 0.8 MG/DL (ref 0.6–1.1)
FOLATE: >20 NG/ML (ref 4.78–24.2)
GFR AFRICAN AMERICAN: >60
GFR NON-AFRICAN AMERICAN: >60
GLUCOSE BLD-MCNC: 87 MG/DL (ref 70–99)
HCT VFR BLD CALC: 40 % (ref 36–48)
HDLC SERPL-MCNC: 53 MG/DL (ref 40–60)
HEMOGLOBIN: 13.7 G/DL (ref 12–16)
LDL CHOLESTEROL CALCULATED: 132 MG/DL
MCH RBC QN AUTO: 28.5 PG (ref 26–34)
MCHC RBC AUTO-ENTMCNC: 34.3 G/DL (ref 31–36)
MCV RBC AUTO: 83.1 FL (ref 80–100)
PDW BLD-RTO: 12.9 % (ref 12.4–15.4)
PLATELET # BLD: 251 K/UL (ref 135–450)
PMV BLD AUTO: 8.2 FL (ref 5–10.5)
POTASSIUM SERPL-SCNC: 4.3 MMOL/L (ref 3.5–5.1)
RBC # BLD: 4.81 M/UL (ref 4–5.2)
SEDIMENTATION RATE, ERYTHROCYTE: 7 MM/HR (ref 0–20)
SODIUM BLD-SCNC: 144 MMOL/L (ref 136–145)
TOTAL PROTEIN: 7.3 G/DL (ref 6.4–8.2)
TRIGL SERPL-MCNC: 137 MG/DL (ref 0–150)
TSH SERPL DL<=0.05 MIU/L-ACNC: 1.12 UIU/ML (ref 0.27–4.2)
VITAMIN B-12: 460 PG/ML (ref 211–911)
VLDLC SERPL CALC-MCNC: 27 MG/DL
WBC # BLD: 7.2 K/UL (ref 4–11)

## 2020-12-11 PROCEDURE — 90686 IIV4 VACC NO PRSV 0.5 ML IM: CPT | Performed by: FAMILY MEDICINE

## 2020-12-11 PROCEDURE — 99214 OFFICE O/P EST MOD 30 MIN: CPT | Performed by: FAMILY MEDICINE

## 2020-12-11 PROCEDURE — 90471 IMMUNIZATION ADMIN: CPT | Performed by: FAMILY MEDICINE

## 2020-12-11 NOTE — PROGRESS NOTES
SUBJECTIVE:  Patient ID: Joseph Ballard is a 47 y.o. y.o. female     HPI   Headache: Patient presents with headache. Symptoms began about several weeks ago. Generally, the headaches last about a few hours and occur frequent. The headache do not seem to be related to any time of the day. The headaches are usually pounding and throbbing and are left-sided unilateral in location. The patient rates her most severe headaches a 5 on a scale from 1 to 10. Recently, the headaches are increasing in frequency. School attendance or other daily activities are affected by the headaches. Precipitating factors include none which have been determined. The headaches are usually not preceded by an aura. Associated neurologic symptoms which are present include: decreased physical activity. The patient denies dizziness, loss of balance, muscle weakness, numbness of extremities, speech difficulties, vision problems, vomiting in the early morning and worsening school/work performance. Other associated symptoms include: nothing pertinent. Symptoms which are not present include: irritability. Home treatment has included See med list with fair improvement. Other history includes: nothing pertinent. Family history includes headaches of unknown type in patient and mother and migraine headaches in patient.       Past Medical History:   Diagnosis Date    Arthritis     Bipolar affective disorder ([address]     Borderline personality disorder ([address]     IBS (irritable bowel syndrome)     Neck pain     Post hysterectomy menopause syndrome       Past Surgical History:   Procedure Laterality Date    APPENDECTOMY  2007    CHOLECYSTECTOMY  2007    KNEE ARTHROSCOPY Right 2010    torn meniscus    KNEE SURGERY Right 2005    Knee cap    OVARY REMOVAL Right     PCOS    SINUS SURGERY  2000    YENNIFER AND BSO  2008    ovarian cyst, adenomyosis- benign    TUBAL LIGATION  2005    TYMPANOSTOMY TUBE PLACEMENT Bilateral     x 3      Family History Problem Relation Age of Onset    Hypertension Mother     Mental Retardation Father         bipolar    Mental Retardation Sister         bipolar     Social History     Socioeconomic History    Marital status:      Spouse name: Osvaldo Valiente Number of children: 2    Years of education: None    Highest education level: None   Occupational History    Occupation: day care      Comment: since    Social Needs    Financial resource strain: Not hard at all   [place] insecurity     Worry: Never true     Inability: Never true   [employer] needs     Medical: No     Non-medical: No   Tobacco Use    Smoking status: Never Smoker    Smokeless tobacco: Never Used   Substance and Sexual Activity    Alcohol use: No     Alcohol/week: 0.0 standard drinks    Drug use: No    Sexual activity: Yes     Partners: Male     Comment: M, 2 kids,    Lifestyle    Physical activity     Days per week: None     Minutes per session: None    Stress: None   Relationships    Social connections     Talks on phone: None     Gets together: None     Attends Rastafarian service: None     Active member of club or organization: None     Attends meetings of clubs or organizations: None     Relationship status: None    Intimate partner violence     Fear of current or ex partner: None     Emotionally abused: None     Physically abused: None     Forced sexual activity: None   Other Topics Concern    None   Social History Narrative        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     FT job    No tobacco      Current Outpatient Medications   Medication Sig Dispense Refill    ARIPiprazole (ABILIFY) 15 MG tablet Take 1 tablet by mouth daily      topiramate (TOPAMAX) 100 MG tablet Take 1 tablet by mouth 2 times daily      Multiple Vitamins-Minerals (THERAPEUTIC MULTIVITAMIN-MINERALS) tablet Take 1 tablet by mouth daily      ziprasidone (GEODON) 40 MG capsule Take 1 capsule by mouth 2 times daily  rizatriptan (MAXALT) 10 MG tablet Take 1 tablet by mouth once as needed for Migraine May repeat in 2 hours if needed 12 tablet 2    OLANZapine (ZYPREXA) 5 MG tablet Take 5 mg by mouth daily      vitamin C (ASCORBIC ACID) 500 MG tablet Take 500 mg by mouth daily      Echinacea 125 MG CAPS Take 2 capsules by mouth daily       No current facility-administered medications for this visit. Allergies   Allergen Reactions    Ultram [Tramadol Hcl]      Hives    Vicodin [Hydrocodone-Acetaminophen] Nausea And Vomiting       Review of Systems   Constitutional: Negative. HENT: Negative. Eyes: Negative. Respiratory: Negative. Cardiovascular: Negative. Gastrointestinal: Negative. Neurological: Positive for headaches. Negative for dizziness, tremors, seizures, syncope, facial asymmetry, speech difficulty, weakness, light-headedness and numbness. All other systems reviewed and are negative. OBJECTIVE:  /78 (Site: Left Upper Arm, Position: Sitting, Cuff Size: Medium Adult)   Pulse 83   Temp 97.8 °F (36.6 °C) (Skin)   Resp 14   Wt 153 lb (69.4 kg)   SpO2 99%   BMI 27.10 kg/m²     Physical Exam  Vitals signs reviewed. Constitutional:       Appearance: Normal appearance. HENT:      Head: Normocephalic and atraumatic. Eyes:      General: No scleral icterus. Conjunctiva/sclera: Conjunctivae normal.   Neck:      Thyroid: No thyromegaly. Vascular: No JVD. Cardiovascular:      Rate and Rhythm: Normal rate and regular rhythm. Heart sounds: Normal heart sounds. No murmur. No friction rub. No gallop. Pulmonary:      Effort: Pulmonary effort is normal.      Breath sounds: Normal breath sounds. No wheezing or rales. Abdominal:      General: Bowel sounds are normal. There is no distension. Palpations: Abdomen is soft. There is no mass. Tenderness: There is no abdominal tenderness. Hernia: No hernia is present.    Lymphadenopathy: Cervical: No cervical adenopathy. Skin:     Findings: No rash. Neurological:      General: No focal deficit present. Mental Status: She is alert and oriented to person, place, and time. Mental status is at baseline. Cranial Nerves: No cranial nerve deficit. Gait: Gait normal.         ASSESSMENT:     Diagnosis Orders   1. Other migraine without status migrainosus, intractable  Basic Metabolic Panel    CBC    Hepatic Function Panel    Sedimentation Rate    C-Reactive Protein    TSH without Reflex    Lipid Panel    Vitamin B12 & Folate   2.  Needs flu shot  INFLUENZA, QUADV, 3 YRS AND OLDER, IM PF, PREFILL SYR OR SDV, 0.5ML (AFLURIA QUADV, PF)       PLAN:  See orders  Monitor symptoms  May need to see neurologist if not better  Try different triptan

## 2020-12-14 ENCOUNTER — HOSPITAL ENCOUNTER (OUTPATIENT)
Dept: MRI IMAGING | Age: 40
Discharge: HOME | End: 2020-12-14
Payer: COMMERCIAL

## 2020-12-14 PROCEDURE — 70551 MRI BRAIN STEM W/O DYE: CPT

## 2020-12-14 ASSESSMENT — ENCOUNTER SYMPTOMS
RESPIRATORY NEGATIVE: 1
EYES NEGATIVE: 1
GASTROINTESTINAL NEGATIVE: 1

## 2020-12-18 RX ORDER — GABAPENTIN 100 MG/1
100 CAPSULE ORAL DAILY
Qty: 30 CAPSULE | Refills: 1 | Status: SHIPPED | OUTPATIENT
Start: 2020-12-18 | End: 2021-01-26 | Stop reason: SDUPTHER

## 2021-01-26 RX ORDER — GABAPENTIN 100 MG/1
100 CAPSULE ORAL DAILY
Qty: 30 CAPSULE | Refills: 1 | Status: SHIPPED | OUTPATIENT
Start: 2021-01-26 | End: 2021-04-09 | Stop reason: SDUPTHER

## 2021-01-26 NOTE — TELEPHONE ENCOUNTER
Medication:   Requested Prescriptions     Pending Prescriptions Disp Refills    gabapentin (NEURONTIN) 100 MG capsule 30 capsule 1     Sig: Take 1 capsule by mouth daily for 30 days. Last appt: 12/11/2020   Next appt: Visit date not found    Last OARRS: No flowsheet data found.

## 2021-04-09 DIAGNOSIS — G43.819 OTHER MIGRAINE WITHOUT STATUS MIGRAINOSUS, INTRACTABLE: ICD-10-CM

## 2021-04-09 NOTE — TELEPHONE ENCOUNTER
Medication:   Requested Prescriptions     Pending Prescriptions Disp Refills    rizatriptan (MAXALT) 10 MG tablet 12 tablet 2     Sig: Take 1 tablet by mouth once as needed for Migraine May repeat in 2 hours if needed    gabapentin (NEURONTIN) 100 MG capsule 30 capsule 1     Sig: Take 1 capsule by mouth daily for 30 days.      Last Filled:  10/13 & 1/26    Last appt: 12/11/2020   Next appt: Visit date not found

## 2021-04-12 RX ORDER — GABAPENTIN 100 MG/1
100 CAPSULE ORAL DAILY
Qty: 30 CAPSULE | Refills: 1 | Status: SHIPPED | OUTPATIENT
Start: 2021-04-12 | End: 2022-07-14

## 2021-04-12 RX ORDER — RIZATRIPTAN BENZOATE 10 MG/1
10 TABLET ORAL
Qty: 12 TABLET | Refills: 2 | Status: SHIPPED | OUTPATIENT
Start: 2021-04-12 | End: 2021-11-16 | Stop reason: SDUPTHER

## 2021-05-04 ENCOUNTER — TELEPHONE (OUTPATIENT)
Dept: PRIMARY CARE CLINIC | Age: 41
End: 2021-05-04

## 2021-05-04 NOTE — TELEPHONE ENCOUNTER
----- Message from Gege Tripp sent at 5/4/2021  1:50 PM EDT -----  Subject: Appointment Request    Reason for Call: Urgent (Patient Request) ED Follow Up Visit    QUESTIONS  Type of Appointment? Established Patient  Reason for appointment request? Available appointments did not meet   patient need  Additional Information for Provider? Patient ER follow up 4/30/21 [facility] - would like after 4:00pm appointment any   day during the week due to work schedule   ---------------------------------------------------------------------------  --------------  [address]  What is the best way for the office to contact you? OK to leave message on   voicemail  Preferred Call Back Phone Number? [phone]  ---------------------------------------------------------------------------  --------------  SCRIPT ANSWERS  Relationship to Patient? Self  Appointment reason? Well Care/Follow Ups  Select a Well Care/Follow Ups appointment reason? Adult ED Follow Up   [Emergency Room, Emergency Department]  (Patient requests to see provider urgently. )? Yes  Do you have any questions for your primary care provider that need to be   answered prior to your appointment? No  Have you been diagnosed with, tested for, or told that you are suspected   of having COVID-19 (Coronavirus)? No  Have you had a fever or taken medication to treat a fever within the past   3 days? No  Have you had a cough, shortness of breath or flu-like symptoms within the   past 3 days? No  Do you currently have flu-like symptoms including fever or chills, cough,   shortness of breath, or difficulty breathing, or new loss of taste or   smell? No  (Service Expert  click yes below to proceed with [employer] As Usual   Scheduling)?  Yes

## 2021-07-12 ENCOUNTER — TELEPHONE (OUTPATIENT)
Dept: PRIMARY CARE CLINIC | Age: 41
End: 2021-07-12

## 2021-07-12 ENCOUNTER — HOSPITAL ENCOUNTER (OUTPATIENT)
Age: 41
Discharge: HOME | End: 2021-07-12
Payer: COMMERCIAL

## 2021-07-12 ENCOUNTER — OFFICE VISIT (OUTPATIENT)
Dept: PRIMARY CARE CLINIC | Age: 41
End: 2021-07-12
Payer: COMMERCIAL

## 2021-07-12 ENCOUNTER — HOSPITAL ENCOUNTER (OUTPATIENT)
Dept: GENERAL RADIOLOGY | Age: 41
Discharge: HOME | End: 2021-07-12
Payer: COMMERCIAL

## 2021-07-12 VITALS
DIASTOLIC BLOOD PRESSURE: 68 MMHG | BODY MASS INDEX: 26.7 KG/M2 | OXYGEN SATURATION: 100 % | SYSTOLIC BLOOD PRESSURE: 100 MMHG | TEMPERATURE: 98.7 F | HEART RATE: 67 BPM | WEIGHT: 150.7 LBS | HEIGHT: 63 IN

## 2021-07-12 DIAGNOSIS — M54.50 LOW BACK PAIN, UNSPECIFIED BACK PAIN LATERALITY, UNSPECIFIED CHRONICITY, UNSPECIFIED WHETHER SCIATICA PRESENT: ICD-10-CM

## 2021-07-12 DIAGNOSIS — M54.50 LOW BACK PAIN, UNSPECIFIED BACK PAIN LATERALITY, UNSPECIFIED CHRONICITY, UNSPECIFIED WHETHER SCIATICA PRESENT: Primary | ICD-10-CM

## 2021-07-12 LAB
BILIRUBIN, POC: NORMAL
BLOOD URINE, POC: NORMAL
CLARITY, POC: CLEAR
COLOR, POC: NORMAL
GLUCOSE URINE, POC: NORMAL
KETONES, POC: NORMAL
LEUKOCYTE EST, POC: NORMAL
NITRITE, POC: NORMAL
PH, POC: 5.5
PROTEIN, POC: NORMAL
SPECIFIC GRAVITY, POC: 1.02
UROBILINOGEN, POC: 0.2

## 2021-07-12 PROCEDURE — 81002 URINALYSIS NONAUTO W/O SCOPE: CPT | Performed by: FAMILY MEDICINE

## 2021-07-12 PROCEDURE — 72100 X-RAY EXAM L-S SPINE 2/3 VWS: CPT

## 2021-07-12 PROCEDURE — 99214 OFFICE O/P EST MOD 30 MIN: CPT | Performed by: FAMILY MEDICINE

## 2021-07-12 RX ORDER — BENZTROPINE MESYLATE 0.5 MG/1
0.5 TABLET ORAL 2 TIMES DAILY
COMMUNITY
Start: 2021-06-25 | End: 2022-07-14

## 2021-07-12 RX ORDER — LAMOTRIGINE 25 MG/1
50 TABLET ORAL DAILY
COMMUNITY
Start: 2021-06-26 | End: 2022-07-14

## 2021-07-12 RX ORDER — CLONAZEPAM 0.5 MG/1
0.5 TABLET ORAL 2 TIMES DAILY PRN
COMMUNITY
Start: 2021-06-25 | End: 2021-07-25

## 2021-07-12 RX ORDER — CYCLOBENZAPRINE HCL 5 MG
5 TABLET ORAL 2 TIMES DAILY PRN
Qty: 20 TABLET | Refills: 0 | Status: SHIPPED | OUTPATIENT
Start: 2021-07-12 | End: 2021-07-22

## 2021-07-12 RX ORDER — MULTIVIT-MIN/IRON/FOLIC ACID/K 18-600-40
CAPSULE ORAL
COMMUNITY

## 2021-07-12 RX ORDER — NAPROXEN 500 MG/1
500 TABLET ORAL 2 TIMES DAILY WITH MEALS
Qty: 60 TABLET | Refills: 0 | Status: SHIPPED | OUTPATIENT
Start: 2021-07-12 | End: 2022-05-24

## 2021-07-12 SDOH — ECONOMIC STABILITY: HOUSING INSECURITY
IN THE LAST 12 MONTHS, WAS THERE A TIME WHEN YOU DID NOT HAVE A STEADY PLACE TO SLEEP OR SLEPT IN A SHELTER (INCLUDING NOW)?: NO

## 2021-07-12 SDOH — HEALTH STABILITY: PHYSICAL HEALTH: ON AVERAGE, HOW MANY DAYS PER WEEK DO YOU ENGAGE IN MODERATE TO STRENUOUS EXERCISE (LIKE A BRISK WALK)?: 2 DAYS

## 2021-07-12 SDOH — ECONOMIC STABILITY: INCOME INSECURITY: IN THE LAST 12 MONTHS, WAS THERE A TIME WHEN YOU WERE NOT ABLE TO PAY THE MORTGAGE OR RENT ON TIME?: NO

## 2021-07-12 SDOH — ECONOMIC STABILITY: FOOD INSECURITY: WITHIN THE PAST 12 MONTHS, THE FOOD YOU BOUGHT JUST DIDN'T LAST AND YOU DIDN'T HAVE MONEY TO GET MORE.: NEVER TRUE

## 2021-07-12 SDOH — ECONOMIC STABILITY: HOUSING INSECURITY: IN THE LAST 12 MONTHS, HOW MANY PLACES HAVE YOU LIVED?: 1

## 2021-07-12 SDOH — ECONOMIC STABILITY: FOOD INSECURITY: WITHIN THE PAST 12 MONTHS, YOU WORRIED THAT YOUR FOOD WOULD RUN OUT BEFORE YOU GOT MONEY TO BUY MORE.: NEVER TRUE

## 2021-07-12 SDOH — HEALTH STABILITY: PHYSICAL HEALTH: ON AVERAGE, HOW MANY MINUTES DO YOU ENGAGE IN EXERCISE AT THIS LEVEL?: 30 MIN

## 2021-07-12 ASSESSMENT — SOCIAL DETERMINANTS OF HEALTH (SDOH)
HOW OFTEN DO YOU ATTENT MEETINGS OF THE CLUB OR ORGANIZATION YOU BELONG TO?: NEVER
DO YOU BELONG TO ANY CLUBS OR ORGANIZATIONS SUCH AS CHURCH GROUPS UNIONS, FRATERNAL OR ATHLETIC GROUPS, OR SCHOOL GROUPS?: NO
IN A TYPICAL WEEK, HOW MANY TIMES DO YOU TALK ON THE PHONE WITH FAMILY, FRIENDS, OR NEIGHBORS?: THREE TIMES A WEEK
HOW OFTEN DO YOU ATTEND CHURCH OR RELIGIOUS SERVICES?: NEVER
HOW OFTEN DO YOU GET TOGETHER WITH FRIENDS OR RELATIVES?: ONCE A WEEK
HOW HARD IS IT FOR YOU TO PAY FOR THE VERY BASICS LIKE FOOD, HOUSING, MEDICAL CARE, AND HEATING?: NOT HARD AT ALL

## 2021-07-12 ASSESSMENT — PATIENT HEALTH QUESTIONNAIRE - PHQ9
1. LITTLE INTEREST OR PLEASURE IN DOING THINGS: NOT AT ALL
1. LITTLE INTEREST OR PLEASURE IN DOING THINGS: NOT AT ALL
SUM OF ALL RESPONSES TO PHQ9 QUESTIONS 1 & 2: 0
2. FEELING DOWN, DEPRESSED OR HOPELESS: NOT AT ALL
DEPRESSION UNABLE TO ASSESS: YES
DEPRESSION UNABLE TO ASSESS: YES
SUM OF ALL RESPONSES TO PHQ9 QUESTIONS 1 & 2: 0
2. FEELING DOWN, DEPRESSED OR HOPELESS: NOT AT ALL

## 2021-07-12 ASSESSMENT — ENCOUNTER SYMPTOMS
GASTROINTESTINAL NEGATIVE: 1
BACK PAIN: 1
EYES NEGATIVE: 1
RESPIRATORY NEGATIVE: 1

## 2021-07-12 ASSESSMENT — LIFESTYLE VARIABLES: HOW OFTEN DO YOU HAVE A DRINK CONTAINING ALCOHOL: NEVER

## 2021-07-12 NOTE — TELEPHONE ENCOUNTER
----- Message from Pam Burdick sent at 7/12/2021  7:59 AM EDT -----  Subject: Appointment Request    Reason for Call: Urgent Back Neck Pain    QUESTIONS  Type of Appointment? Established Patient  Reason for appointment request? No appointments available during search  Additional Information for Provider? PT is having back pain; last week she   states she threw her back out. Needs to see Dr. Braun Poster. No apts available,   please contact PT back with any open availability and/or Dr Kaz Bautista   recommendations.   ---------------------------------------------------------------------------  --------------  [employer] INFO  What is the best way for the office to contact you? OK to leave message on   voicemail  Preferred Call Back Phone Number? [phone]  ---------------------------------------------------------------------------  --------------  SCRIPT ANSWERS  Relationship to Patient? Self  Appointment reason? Symptomatic  Select script based on patient symptoms? Adult Back or Neck Pain [Slipped   disc, Herniated disc, sciatica]  Did you have an injury or trauma within the past 3 days? No  Are you having new problems with your bowel or bladder control? No  Are you having new onset numbness, tingling, or weakness in your arms   and/or legs with this pain? No  Did your pain begin within the past 14 days? No  Are you having severe pain? Yes  Have you been diagnosed with, awaiting test results for, or told that you   are suspected of having COVID-19 (Coronavirus)? (If patient has tested   negative or was tested as a requirement for work, school, or travel and   not based on symptoms, answer no)? No  Do you currently have flu-like symptoms including fever or chills, cough,   shortness of breath, difficulty breathing, or new loss of taste or smell? No  Have you had close contact with someone with COVID-19 in the last 14 days? No  (Service Expert  click yes below to proceed with [employer] As Usual   Scheduling)?  Yes

## 2021-07-12 NOTE — PROGRESS NOTES
SUBJECTIVE:  Patient ID: Kana Silverman is a 46 y.o. y.o. female     HPI   Back Pain: Patient presents for presents evaluation of low back problems. Symptoms have been present for 10 days and include numbness in EL, pain in LE  (aching and throbbing in character; 8/10 in severity), paresthesias in LE and stiffness in lower back . Initial inciting event: none. Symptoms are worst: morning. Alleviating factors identifiable by patient are none. Exacerbating factors identifiable by patient are bending forwards. Treatments so far initiated by patient: NSAID's  Previous lower back problems: none. Previous workup: none. Previous treatments: none. With bipolar doing well no current management see psychiatrist    Past Medical History:   Diagnosis Date    Arthritis     Bipolar affective disorder ([address]     Borderline personality disorder ([address]     IBS (irritable bowel syndrome)     Neck pain     Post hysterectomy menopause syndrome       Past Surgical History:   Procedure Laterality Date    APPENDECTOMY  2007    CHOLECYSTECTOMY  2007    KNEE ARTHROSCOPY Right 2010    torn meniscus    KNEE SURGERY Right 2005    Knee cap    OVARY REMOVAL Right     PCOS    SINUS SURGERY  2000    YENNIFER AND BSO  2008    ovarian cyst, adenomyosis- benign    TUBAL LIGATION  2005    TYMPANOSTOMY TUBE PLACEMENT Bilateral     x 3      Family History   Problem Relation Age of Onset    Hypertension Mother     Mental Retardation Father         bipolar    Mental Retardation Sister         bipolar     Social History     Socioeconomic History    Marital status:      Spouse name: Cherry Hurtado Number of children: 2    Years of education: None    Highest education level: None   Occupational History    Occupation: day care      Comment: since    Tobacco Use    Smoking status: Never Smoker    Smokeless tobacco: Never Used   Substance and Sexual Activity    Alcohol use: No     Alcohol/week: 0.0 standard drinks    Drug use:  No  Sexual activity: Yes     Partners: Male     Comment: M, 2 kids,    Other Topics Concern    None   Social History Narrative        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     FT job    No tobacco      Social Determinants of Health     Financial Resource Strain: Low Risk     Difficulty of Paying Living Expenses: Not hard at all   Food Insecurity: No Food Insecurity    Worried About [address] in the Last Year: Never true    [address] in the Last Year: Never true   Transportation Needs:     Lack of Transportation (Medical):  Lack of Transportation (Non-Medical):    Physical Activity: Insufficiently Active    Days of Exercise per Week: 2 days    Minutes of Exercise per Session: 30 min   Stress: No Stress Concern Present    Feeling of Stress : Not at all   Social Connections: Moderately Isolated    Frequency of Communication with Friends and Family: Three times a week    Frequency of Social Gatherings with Friends and Family: Once a week    Attends Denominational Services: Never    Active Member of Clubs or Organizations: No    Attends Club or Organization Meetings: Never    Marital Status:    Intimate Partner Violence:     Fear of Current or Ex-Partner:     Emotionally Abused:     Physically Abused:     Sexually Abused:      Current Outpatient Medications   Medication Sig Dispense Refill    benztropine (COGENTIN) 0.5 MG tablet Take 0.5 mg by mouth 2 times daily      clonazePAM (KLONOPIN) 0.5 MG tablet Take 0.5 mg by mouth 2 times daily as needed.       lamoTRIgine (LAMICTAL) 25 MG tablet Take 50 mg by mouth daily      lurasidone (LATUDA) 80 MG TABS tablet Take 80 mg by mouth Daily with supper      Cholecalciferol (VITAMIN D) 50 MCG (2000 UT) CAPS capsule Take by mouth      OLANZapine (ZYPREXA) 5 MG tablet Take 5 mg by mouth daily       Multiple Vitamins-Minerals (THERAPEUTIC MULTIVITAMIN-MINERALS) tablet Take 1 tablet by mouth daily      rizatriptan (MAXALT) 10 MG tablet Take 1 tablet by mouth once as needed for Migraine May repeat in 2 hours if needed 12 tablet 2    gabapentin (NEURONTIN) 100 MG capsule Take 1 capsule by mouth daily for 30 days. 30 capsule 1    ziprasidone (GEODON) 40 MG capsule Take 1 capsule by mouth 2 times daily      topiramate (TOPAMAX) 100 MG tablet Take 1 tablet by mouth 2 times daily (Patient not taking: Reported on 7/12/2021)      vitamin C (ASCORBIC ACID) 500 MG tablet Take 500 mg by mouth daily (Patient not taking: Reported on 7/12/2021)       No current facility-administered medications for this visit. Allergies   Allergen Reactions    Ultram [Tramadol Hcl]      Hives    Vicodin [Hydrocodone-Acetaminophen] Nausea And Vomiting       Review of Systems   Constitutional: Negative. HENT: Negative. Eyes: Negative. Respiratory: Negative. Cardiovascular: Negative. Gastrointestinal: Negative. Musculoskeletal: Positive for back pain. All other systems reviewed and are negative. OBJECTIVE:  /68 (Site: Left Upper Arm, Position: Sitting, Cuff Size: Small Adult)   Pulse 67   Temp 98.7 °F (37.1 °C) (Oral)   Ht 5' 3\" (1.6 m)   Wt 150 lb 11.2 oz (68.4 kg)   SpO2 100%   BMI 26.70 kg/m²     Physical Exam  Vitals reviewed. Musculoskeletal:      Lumbar back: Spasms and tenderness present. Negative right straight leg raise test and negative left straight leg raise test.         ASSESSMENT:   Diagnosis Orders   1.  Low back pain, unspecified back pain laterality, unspecified chronicity, unspecified whether sciatica present  POCT Urinalysis no Micro    naproxen (NAPROSYN) 500 MG tablet    cyclobenzaprine (FLEXERIL) 5 MG tablet    XR LUMBAR SPINE (2-3 VIEWS)    Culture, Urine         PLAN:    See orders   Exercise sheet is given   May need PT

## 2021-07-12 NOTE — PATIENT INSTRUCTIONS
Patient Education        Back Stretches: Exercises  Introduction  Here are some examples of exercises for stretching your back. Start each exercise slowly. Ease off the exercise if you start to have pain. Your doctor or physical therapist will tell you when you can start these exercises and which ones will work best for you. How to do the exercises  Overhead stretch   1. Stand comfortably with your feet shoulder-width apart. 2. Looking straight ahead, raise both arms over your head and reach toward the ceiling. Do not allow your head to tilt back. 3. Hold for 15 to 30 seconds, then lower your arms to your sides. 4. Repeat 2 to 4 times. Side stretch   1. Stand comfortably with your feet shoulder-width apart. 2. Raise one arm over your head, and then lean to the other side. 3. Slide your hand down your leg as you let the weight of your arm gently stretch your side muscles. Hold for 15 to 30 seconds. 4. Repeat 2 to 4 times on each side. Press-up   1. Lie on your stomach, supporting your body with your forearms. 2. Press your elbows down into the floor to raise your upper back. As you do this, relax your stomach muscles and allow your back to arch without using your back muscles. As your press up, do not let your hips or pelvis come off the floor. 3. Hold for 15 to 30 seconds, then relax. 4. Repeat 2 to 4 times. Relax and rest   1. Lie on your back with a rolled towel under your neck and a pillow under your knees. Extend your arms comfortably to your sides. 2. Relax and breathe normally. 3. Remain in this position for about 10 minutes. 4. If you can, do this 2 or 3 times each day. Follow-up care is a key part of your treatment and safety. Be sure to make and go to all appointments, and call your doctor if you are having problems. It's also a good idea to know your test results and keep a list of the medicines you take. Where can you learn more? Go to https://shay.healthBraintech. org and sign in to your [employer] account. Enter M870 in the card.io box to learn more about \"Back Stretches: Exercises. \"     If you do not have an account, please click on the \"Sign Up Now\" link. Current as of: November 16, 2020               Content Version: 12.9  © 7572-5302 [employer]. Care instructions adapted under license by [facility] ([facility]). If you have questions about a medical condition or this instruction, always ask your healthcare professional. [place] [address] any warranty or liability for your use of this information. Patient Education        Back Care and Preventing Injuries: Care Instructions  Your Care Instructions     You can hurt your back doing many everyday activities: lifting a heavy box, bending down to garden, exercising at the gym, and even getting out of bed. But you can keep your back strong and healthy by doing some exercises. You also can follow a few tips for sitting, sleeping, and lifting to avoid hurting your back again. Talk to your doctor before you start an exercise program. Ask for help if you want to learn more about keeping your back healthy. Follow-up care is a key part of your treatment and safety. Be sure to make and go to all appointments, and call your doctor if you are having problems. It's also a good idea to know your test results and keep a list of the medicines you take. How can you care for yourself at home? · Stay at a healthy weight to avoid strain on your lower back. · Do not smoke. Smoking increases the risk of osteoporosis, which weakens the spine. If you need help quitting, talk to your doctor about stop-smoking programs and medicines. These can increase your chances of quitting for good. · Make sure you sleep in a position that maintains your back's normal curves and on a mattress that feels comfortable.  Sleep on your side with a pillow between your knees, or sleep on your back with a pillow under your knees. These positions can reduce strain on your back. · When you get out of bed, lie on your side and bend both knees. Drop your feet over the edge of the bed as you push up with both arms. Scoot to the edge of the bed. Make sure your feet are in line with your rear end (buttocks), and then stand up. · If you must stand for a long time, put one foot on a stool, ledge, or box. Exercise to strengthen your back and other muscles  · Get at least 30 minutes of exercise on most days of the week. Walking is a good choice. You also may want to do other activities, such as running, swimming, cycling, or playing tennis or team sports. · Stretch your back muscles. Here are few exercises to try:  ? Lie on your back with your knees bent and your feet flat on the floor. Gently pull one bent knee to your chest. Put that foot back on the floor, and then pull the other knee to your chest. Hold for 15 to 30 seconds. Repeat 2 to 4 times. ? Do pelvic tilts. Lie on your back with your knees bent. Tighten your stomach muscles. Pull your belly button (navel) in and up toward your ribs. You should feel like your back is pressing to the floor and your hips and pelvis are slightly lifting off the floor. Hold for 6 seconds while breathing smoothly. · Keep your core muscles strong. The muscles of your back, belly (abdomen), and buttocks support your spine. ? Pull in your belly, and imagine pulling your navel toward your spine. Hold this for 6 seconds, then relax. Remember to keep breathing normally as you tense your muscles. ? Do curl-ups. Always do them with your knees bent. Keep your low back on the floor, and curl your shoulders toward your knees using a smooth, slow motion. Keep your arms folded across your chest. If this bothers your neck, try putting your hands behind your neck (not your head), with your elbows spread apart. ? Lie on your back with your knees bent and your feet flat on the floor.  Tighten your belly muscles, and Search Health Information box to learn more about \"Back Care and Preventing Injuries: Care Instructions. \"     If you do not have an account, please click on the \"Sign Up Now\" link. Current as of: November 16, 2020               Content Version: 12.9  © 2006-2021 Healthwise[employer]. Care instructions adapted under license by [facility] ([facility]). If you have questions about a medical condition or this instruction, always ask your healthcare professional. [address] any warranty or liability for your use of this information. Patient Education        Low Back Pain: Exercises  Introduction  Here are some examples of exercises for you to try. The exercises may be suggested for a condition or for rehabilitation. Start each exercise slowly. Ease off the exercises if you start to have pain. You will be told when to start these exercises and which ones will work best for you. How to do the exercises  Press-up   6. Lie on your stomach, supporting your body with your forearms. 7. Press your elbows down into the floor to raise your upper back. As you do this, relax your stomach muscles and allow your back to arch without using your back muscles. As your press up, do not let your hips or pelvis come off the floor. 8. Hold for 15 to 30 seconds, then relax. 9. Repeat 2 to 4 times. Alternate arm and leg (bird dog) exercise   Do this exercise slowly. Try to keep your body straight at all times, and do not let one hip drop lower than the other. 4. Start on the floor, on your hands and knees. 5. Tighten your belly muscles. 6. Raise one leg off the floor, and hold it straight out behind you. Be careful not to let your hip drop down, because that will twist your trunk. 7. Hold for about 6 seconds, then lower your leg and switch to the other leg. 8. Repeat 8 to 12 times on each leg. 9. Over time, work up to holding for 10 to 30 seconds each time.   10. If you feel stable and secure with your leg raised, try raising the opposite arm straight out in front of you at the same time. Knee-to-chest exercise   6. Lie on your back with your knees bent and your feet flat on the floor. 7. Bring one knee to your chest, keeping the other foot flat on the floor (or keeping the other leg straight, whichever feels better on your lower back). 8. Keep your lower back pressed to the floor. Hold for at least 15 to 30 seconds. 9. Relax, and lower the knee to the starting position. 10. Repeat with the other leg. Repeat 2 to 4 times with each leg. 11. To get more stretch, put your other leg flat on the floor while pulling your knee to your chest.    Curl-ups   7. Lie on the floor on your back with your knees bent at a 90-degree angle. Your feet should be flat on the floor, about 12 inches from your buttocks. 8. Cross your arms over your chest. If this bothers your neck, try putting your hands behind your neck (not your head), with your elbows spread apart. 9. Slowly tighten your belly muscles and raise your shoulder blades off the floor. 10. Keep your head in line with your body, and do not press your chin to your chest.  11. Hold this position for 1 or 2 seconds, then slowly lower yourself back down to the floor. 12. Repeat 8 to 12 times. Pelvic tilt exercise   1. Lie on your back with your knees bent. 2. \"Brace\" your stomach. This means to tighten your muscles by pulling in and imagining your belly button moving toward your spine. You should feel like your back is pressing to the floor and your hips and pelvis are rocking back. 3. Hold for about 6 seconds while you breathe smoothly. 4. Repeat 8 to 12 times. Heel dig bridging   1. Lie on your back with both knees bent and your ankles bent so that only your heels are digging into the floor. Your knees should be bent about 90 degrees.   2. Then push your heels into the floor, squeeze your buttocks, and lift your hips off the floor until your shoulders, hips, and knees are all in a straight line. 3. Hold for about 6 seconds as you continue to breathe normally, and then slowly lower your hips back down to the floor and rest for up to 10 seconds. 4. Do 8 to 12 repetitions. Hamstring stretch in doorway   1. Lie on your back in a doorway, with one leg through the open door. 2. Slide your leg up the wall to straighten your knee. You should feel a gentle stretch down the back of your leg. 3. Hold the stretch for at least 15 to 30 seconds. Do not arch your back, point your toes, or bend either knee. Keep one heel touching the floor and the other heel touching the wall. 4. Repeat with your other leg. 5. Do 2 to 4 times for each leg. Hip flexor stretch   1. Kneel on the floor with one knee bent and one leg behind you. Place your forward knee over your foot. Keep your other knee touching the floor. 2. Slowly push your hips forward until you feel a stretch in the upper thigh of your rear leg. 3. Hold the stretch for at least 15 to 30 seconds. Repeat with your other leg. 4. Do 2 to 4 times on each side. Wall sit   1. Stand with your back 10 to 12 inches away from a wall. 2. Lean into the wall until your back is flat against it. 3. Slowly slide down until your knees are slightly bent, pressing your lower back into the wall. 4. Hold for about 6 seconds, then slide back up the wall. 5. Repeat 8 to 12 times. Follow-up care is a key part of your treatment and safety. Be sure to make and go to all appointments, and call your doctor if you are having problems. It's also a good idea to know your test results and keep a list of the medicines you take. Where can you learn more? Go to [email or web address] [email or web address]. org and sign in to your [employer] account. Enter K031 in the [employer] box to learn more about \"Low Back Pain: Exercises. \"     If you do not have an account, please click on the \"Sign Up Now\" link.   Current as of: November 16, 2020               Content Version: 12.9  © 1560-7185 Healthwise, Incorporated. Care instructions adapted under license by [facility] ([facility]). If you have questions about a medical condition or this instruction, always ask your healthcare professional. [address] any warranty or liability for your use of this information.

## 2021-07-13 LAB — URINE CULTURE, ROUTINE: NORMAL

## 2021-11-16 DIAGNOSIS — G43.819 OTHER MIGRAINE WITHOUT STATUS MIGRAINOSUS, INTRACTABLE: ICD-10-CM

## 2021-11-16 RX ORDER — RIZATRIPTAN BENZOATE 10 MG/1
10 TABLET ORAL
Qty: 12 TABLET | Refills: 2 | Status: SHIPPED | OUTPATIENT
Start: 2021-11-16 | End: 2022-05-13 | Stop reason: SDUPTHER

## 2021-11-16 NOTE — TELEPHONE ENCOUNTER
Medication:   Requested Prescriptions     Pending Prescriptions Disp Refills    rizatriptan (MAXALT) 10 MG tablet 12 tablet 2     Sig: Take 1 tablet by mouth once as needed for Migraine May repeat in 2 hours if needed     Last Filled:  04/12/21    Last appt: 7/12/2021   Next appt: Visit date not found    Last OARRS: No flowsheet data found.

## 2022-05-13 ENCOUNTER — OFFICE VISIT (OUTPATIENT)
Dept: PRIMARY CARE CLINIC | Age: 42
End: 2022-05-13
Payer: COMMERCIAL

## 2022-05-13 VITALS
RESPIRATION RATE: 15 BRPM | DIASTOLIC BLOOD PRESSURE: 68 MMHG | HEART RATE: 69 BPM | TEMPERATURE: 97.1 F | OXYGEN SATURATION: 99 % | BODY MASS INDEX: 26.39 KG/M2 | SYSTOLIC BLOOD PRESSURE: 112 MMHG | WEIGHT: 149 LBS

## 2022-05-13 DIAGNOSIS — F31.9 BIPOLAR 1 DISORDER (HCC): ICD-10-CM

## 2022-05-13 DIAGNOSIS — G43.819 OTHER MIGRAINE WITHOUT STATUS MIGRAINOSUS, INTRACTABLE: Primary | ICD-10-CM

## 2022-05-13 PROCEDURE — 99214 OFFICE O/P EST MOD 30 MIN: CPT | Performed by: FAMILY MEDICINE

## 2022-05-13 RX ORDER — RIZATRIPTAN BENZOATE 10 MG/1
10 TABLET ORAL
Qty: 12 TABLET | Refills: 2 | Status: SHIPPED | OUTPATIENT
Start: 2022-05-13 | End: 2022-07-14

## 2022-05-13 RX ORDER — AMANTADINE HYDROCHLORIDE 100 MG/1
TABLET ORAL
COMMUNITY
Start: 2022-04-27

## 2022-05-13 RX ORDER — PROPRANOLOL HCL 60 MG
60 CAPSULE, EXTENDED RELEASE 24HR ORAL DAILY
Qty: 30 CAPSULE | Refills: 3 | Status: SHIPPED
Start: 2022-05-13 | End: 2022-05-24 | Stop reason: SINTOL

## 2022-05-13 ASSESSMENT — PATIENT HEALTH QUESTIONNAIRE - PHQ9
8. MOVING OR SPEAKING SO SLOWLY THAT OTHER PEOPLE COULD HAVE NOTICED. OR THE OPPOSITE, BEING SO FIGETY OR RESTLESS THAT YOU HAVE BEEN MOVING AROUND A LOT MORE THAN USUAL: 0
SUM OF ALL RESPONSES TO PHQ9 QUESTIONS 1 & 2: 0
5. POOR APPETITE OR OVEREATING: 0
10. IF YOU CHECKED OFF ANY PROBLEMS, HOW DIFFICULT HAVE THESE PROBLEMS MADE IT FOR YOU TO DO YOUR WORK, TAKE CARE OF THINGS AT HOME, OR GET ALONG WITH OTHER PEOPLE: 0
1. LITTLE INTEREST OR PLEASURE IN DOING THINGS: 0
3. TROUBLE FALLING OR STAYING ASLEEP: 0
SUM OF ALL RESPONSES TO PHQ QUESTIONS 1-9: 0
SUM OF ALL RESPONSES TO PHQ QUESTIONS 1-9: 0
6. FEELING BAD ABOUT YOURSELF - OR THAT YOU ARE A FAILURE OR HAVE LET YOURSELF OR YOUR FAMILY DOWN: 0
SUM OF ALL RESPONSES TO PHQ QUESTIONS 1-9: 0
9. THOUGHTS THAT YOU WOULD BE BETTER OFF DEAD, OR OF HURTING YOURSELF: 0
SUM OF ALL RESPONSES TO PHQ QUESTIONS 1-9: 0
2. FEELING DOWN, DEPRESSED OR HOPELESS: 0
7. TROUBLE CONCENTRATING ON THINGS, SUCH AS READING THE NEWSPAPER OR WATCHING TELEVISION: 0

## 2022-05-13 NOTE — PROGRESS NOTES
Subjective:      Patient ID: Ji Garber is a 38 y.o. female. Headache: Patient is here for follow up headache. She does not have a headache at this time. Was seen by different doctor Since the last time she was seen here than she decided to switch back to our us  She has been having 6-7 times a month, but she is not sure  Description of Headaches:  Location of pain: right-sided unilateral, left-sided unilateral, bilateral, frontal  Radiation of pain?:none  Character of pain:aching and sharp  Severity of pain: 7 and increase in frequency in the last few months   Accompanying symptoms: photophobia, neck stiffness  Prodromal sx?: none  Rapidity of onset: sudden  Typical duration of individual headache: 2 hours  Are most headaches similar in presentation?  yes  Typical precipitants: none  She has been going through a lot of stress  Make her migraine may be worse  Daughter recent diagnosed with migraine  She noticed lately they are getting a lot worse more often  Patient to with bipolar on multiple medications followed by psychiatry  Past Medical History:   Diagnosis Date    Arthritis     [facility] affective disorder [facility] [address]     Borderline personality disorder ([facility] [address]     IBS (irritable bowel syndrome)     Neck pain     Post hysterectomy menopause syndrome       Past Surgical History:   Procedure Laterality Date    APPENDECTOMY  2007    CHOLECYSTECTOMY  2007    KNEE ARTHROSCOPY Right 2010    torn meniscus    KNEE SURGERY Right 2005    Knee cap    OVARY REMOVAL Right     PCOS    SINUS SURGERY  2000    YENNIFER AND BSO  2008    ovarian cyst, adenomyosis- benign    TUBAL LIGATION  2005    TYMPANOSTOMY TUBE PLACEMENT Bilateral     x 3      Family History   Problem Relation Age of Onset    Hypertension Mother     Mental Retardation Father         bipolar    Mental Retardation Sister         bipolar     Social History     Socioeconomic History    Marital status:      Spouse name: Collin Painting Number of children: 2    Years of education: None    Highest education level: None   Occupational History    Occupation: day care      Comment: since    Tobacco Use    Smoking status: Never Smoker    Smokeless tobacco: Never Used   Substance and Sexual Activity    Alcohol use: No     Alcohol/week: 0.0 standard drinks    Drug use: No    Sexual activity: Yes     Partners: Male     Comment: M, 2 kids,    Other Topics Concern    None   Social History Narrative        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     FT job    No tobacco      Social Determinants of Health     Financial Resource Strain: Low Risk     Difficulty of Paying Living Expenses: Not hard at all   Food Insecurity: No Food Insecurity    Worried About [address] [employer] in the Last Year: Never true    [address] in the Last Year: Never true   Transportation Needs:     Lack of Transportation (Medical): Not on file    Lack of Transportation (Non-Medical): Not on file   Physical Activity: Insufficiently Active    Days of Exercise per Week: 2 days    Minutes of Exercise per Session: 30 min   Stress: No Stress Concern Present    Feeling of Stress : Not at all   Social Connections: Moderately Isolated    Frequency of Communication with Friends and Family:  Three times a week    Frequency of Social Gatherings with Friends and Family: Once a week    Attends Mormon Services: Never    Active Member of Clubs or Organizations: No    Attends Club or Organization Meetings: Never    Marital Status:    Intimate Partner Violence:     Fear of Current or Ex-Partner: Not on file    Emotionally Abused: Not on file    Physically Abused: Not on file    Sexually Abused: Not on file   Housing Stability: [address] Unable to Pay for Housing in the Last Year: No    Number of [place] in the Last Year: 1    Unstable Housing in the Last Year: No     Current Outpatient Medications   Medication Sig Dispense Refill    Amantadine (SYMMETREL) 100 MG TABS tablet TAKE 1/2 TABLET BY MOUTH ONCE DAILY FOR 7 DAYS THEN INCREASE TO 1/2 TABLET TWICE DAILY      lamoTRIgine (LAMICTAL) 25 MG tablet Take 50 mg by mouth daily      lurasidone (LATUDA) 80 MG TABS tablet Take 80 mg by mouth Daily with supper      Cholecalciferol (VITAMIN D) 50 MCG (2000 UT) CAPS capsule Take by mouth      naproxen (NAPROSYN) 500 MG tablet Take 1 tablet by mouth 2 times daily (with meals) 60 tablet 0    Multiple Vitamins-Minerals (THERAPEUTIC MULTIVITAMIN-MINERALS) tablet Take 1 tablet by mouth daily      rizatriptan (MAXALT) 10 MG tablet Take 1 tablet by mouth once as needed for Migraine May repeat in 2 hours if needed 12 tablet 2    benztropine (COGENTIN) 0.5 MG tablet Take 0.5 mg by mouth 2 times daily (Patient not taking: Reported on 5/13/2022)      gabapentin (NEURONTIN) 100 MG capsule Take 1 capsule by mouth daily for 30 days. 30 capsule 1    topiramate (TOPAMAX) 100 MG tablet Take 1 tablet by mouth 2 times daily (Patient not taking: Reported on 7/12/2021)      OLANZapine (ZYPREXA) 5 MG tablet Take 5 mg by mouth daily  (Patient not taking: Reported on 5/13/2022)      vitamin C (ASCORBIC ACID) 500 MG tablet Take 500 mg by mouth daily (Patient not taking: Reported on 5/13/2022)       No current facility-administered medications for this visit. Allergies   Allergen Reactions    Ultram [Tramadol Hcl]      Hives    Vicodin [Hydrocodone-Acetaminophen] Nausea And Vomiting           Objective:   Physical Exam   [vitalsreviewed. Constitutional: She is oriented to person, place, and time. She appears well-developed and well-nourished. No distress. HENT:   Head: Normocephalic and atraumatic. Right Ear: External ear normal.   Left Ear: External ear normal.   Nose: Nose normal.   Mouth/Throat: Oropharynx is clear and moist. No oropharyngeal exudate. Eyes: Conjunctivae and EOM are normal. Pupils are equal, round, and reactive to light. Right eye exhibits no discharge. Left eye exhibits no discharge. No scleral icterus. Neck: Normal range of motion. Neck supple. No JVD present. No tracheal deviation present. No thyromegaly present. Cardiovascular: Normal rate, regular rhythm, normal heart sounds and intact distal pulses. Exam reveals no gallop and no friction rub. No murmur heard. Pulmonary/Chest: Effort normal and breath sounds normal. No stridor. No respiratory distress. She has no wheezes. She has no rales. She exhibits no tenderness. Abdominal: Soft. Bowel sounds are normal. She exhibits no distension and no mass. Tenderness over the left upper quadrant  She has no rebound and no guarding. Musculoskeletal: Normal range of motion. She exhibits no edema and no tenderness. Lymphadenopathy:     She has no cervical adenopathy. Neurological: She is alert and oriented to person, place, and time. She has normal reflexes. No cranial nerve deficit. She exhibits normal muscle tone. Coordination normal.   Skin: Skin is warm and dry. No rash noted. She is not diaphoretic. No erythema. No pallor. Psychiatric: She has a normal mood and affect. Her behavior is normal. Judgment and thought content normal.       Assessment:         Diagnosis Orders   1. Other migraine without status migrainosus, intractable  rizatriptan (MAXALT) 10 MG tablet   2.  Bipolar 1 disorder ([address]           Plan:      See orders  Start Inderal  Keep a diary of headache  Close follow-up  We discussed other option of preventive she already failed amitriptyline Topamax

## 2022-05-13 NOTE — PATIENT INSTRUCTIONS
Patient Education        Recurring Migraine Headache: Care Instructions  Overview  Migraines are painful, throbbing headaches. They often start on one side of the head. They may cause nausea and vomiting and make you sensitive to light, sound, or smell. Some people may have only a few migraines throughout life. Others have them as often as several times a month. The goal of treatment is to reduce the number of migraines you have and relieve your symptoms. Even with treatment, you may continue to have migraines. You play an important role in dealing with your headaches. Work on avoiding things that seem to trigger your migraines. When you feel a headache coming on, actquickly to stop it before it gets worse. Follow-up care is a key part of your treatment and safety. Be sure to make and go to all appointments, and call your doctor if you are having problems. It's also a good idea to know your test results and keep alist of the medicines you take. How can you care for yourself at home?  Do not drive if you have taken a prescription pain medicine.  Rest in a quiet, dark room until your headache is gone. Close your eyes and try to relax or go to sleep. Do not watch TV or read.  Put a cold, moist cloth or cold pack on the painful area for 10 to 20 minutes at a time. Put a thin cloth between the cold pack and your skin.  Have someone gently massage your neck and shoulders.  Take your medicines exactly as prescribed. Call your doctor if you think you are having a problem with your medicine. You will get more details on the specific medicines your doctor prescribes.  Don't take medicine for headache pain too often. Talk to your doctor if you are taking medicine more than 2 days a week to stop a headache. Taking too much pain medicine can lead to more headaches. These are called medicine-overuse headaches. To prevent migraines   Keep a headache diary so you can figure out what triggers your headaches. Avoiding triggers may help you prevent headaches. Record when each headache began, how long it lasted, and what the pain was like. Write down any other symptoms you had with the headache. These may include nausea, flashing lights or dark spots, or sensitivity to bright light or loud noise. Note if the headache occurred near your period. List anything that might have triggered the headache. Triggers may include certain foods (chocolate, cheese, wine) or odors, smoke, bright light, stress, or lack of sleep.  If your doctor has prescribed medicine for your migraines, take it as directed. You may have medicine that you take only when you get a migraine and medicine that you take all the time to help prevent migraines. ? If your doctor has prescribed medicine for when you get a headache, take it at the first sign of a migraine, unless your doctor has given you other instructions. ? If your doctor has prescribed medicine to prevent migraines, take it exactly as prescribed. Call your doctor if you think you are having a problem with your medicine.  Find healthy ways to deal with stress. Migraines are most common during or right after stressful times. Try finding ways to reduce stress like practicing mindfulness or deep breathing exercises.  Get regular sleep and exercise. But be careful to not push yourself too hard during exercise. It may trigger a headache.  Eat regular meals, and avoid foods and drinks that often trigger migraines. These include chocolate and alcohol, especially red wine and port. Chemicals used in food, such as aspartame and monosodium glutamate (MSG), also can trigger migraines. So can some food additives, such as those found in hot dogs, maldonado, cold cuts, aged cheeses, and pickled foods.  Limit caffeine by not drinking too much coffee, tea, or soda. Do not quit caffeine suddenly, because that can also give you migraines.  Do not smoke or allow others to smoke around you.  If you need help quitting, talk to your doctor about stop-smoking programs and medicines. These can increase your chances of quitting for good.  If you are taking birth control pills or hormone therapy, talk to your doctor about whether they are triggering your migraines. When should you call for help? Call 911 anytime you think you may need emergency care. For example, call if:     You have symptoms of a stroke. These may include:  ? Sudden numbness, tingling, weakness, or loss of movement in your face, arm, or leg, especially on only one side of your body. ? Sudden vision changes. ? Sudden trouble speaking. ? Sudden confusion or trouble understanding simple statements. ? Sudden problems with walking or balance. ? A sudden, severe headache that is different from past headaches. Call your doctor now or seek immediate medical care if:     You develop a fever and a stiff neck.      You have new nausea and vomiting, or you cannot keep down food or liquids. Watch closely for changes in your health, and be sure to contact your doctor if:     You have a headache that does not get better within 1 or 2 days.      Your headaches get worse or happen more often. Where can you learn more? Go to [email or web address].[email or web address] org and sign in to your [employer] account. Enter  in the [facility] box to learn more about \"Recurring Migraine Headache: Care Instructions. \"     If you do not have an account, please click on the \"Sign Up Now\" link. Current as of: December 13, 2021               Content Version: 13.2  © 2006-2022 Healthwise, Incorporated. Care instructions adapted under license by [facility] ([facility]). If you have questions about a medical condition or this instruction, always ask your healthcare professional. [place] [address] any warranty or liability for your use of this information.          Patient Education        Deciding About Taking Medicine to Prevent Migraines  How can you decide about taking medicine to prevent migraine headaches? What are migraines? Migraines are painful, throbbing headaches. They can last from 4 to 72 hours. They often occur on only one side of your head. But you may feel them on bothsides. The pain may keep you from doing your daily activities. You may take a daily medicine if you get bad migraines often. This can helpprevent them. What are key points about this decision?  Medicines to prevent migraines may not stop them every time. But if you take them daily, you can reduce how many migraines you get by more than half. They can also reduce how long migraines last. And your symptoms may not be as bad.  Medicines that prevent migraines may cause side effects. You may have sleep and memory problems, upset stomach, dry mouth, or constipation. Some of these side effects may last for as long as you take the medicine. Or they may go away within a few weeks. Why might you choose to take medicine to prevent migraines?  You are willing to take medicine daily if it will help your symptoms.  You don't think the side effects of the medicine could be as bad as your migraine symptoms.  Your migraines get in the way of your work. Or they harm your relationships with friends and family.  Benefits of medicine include fewer or no migraines. And your migraines may not last as long or feel as bad. Why might you choose not to take medicine to prevent migraines?  You want to avoid the side effects of the medicine.  You don't want to take medicine every day.  Your migraines are not affecting your work and relationships.  If your symptoms don't improve with home treatment and other medicines, you can decide later to take medicine every day to help prevent migraines. Your decision  Thinking about the facts and your feelings can help you make a decision that is right for you.  Be sure you understand the benefits and risks of your options,and think about what else you need to do before you make the decision. Where can you learn more? Go to [email or web address] and sign in to your [employer] account. Enter B496 in the [facility] box to learn more about \"Deciding About Taking Medicine to Prevent Migraines. \"     If you do not have an account, please click on the \"Sign Up Now\" link. Current as of: December 13, 2021               Content Version: 13.2  © 2006-2022 [employer]. Care instructions adapted under license by [employer] ([facility]). If you have questions about a medical condition or this instruction, always ask your healthcare professional. [place]tt[place]es any warranty or liability for your use of this information. Patient Education         Headaches: Keeping a Diary (01:31)  Your health professional recommends that you watch this short online healthvideo. Learn how keeping a headache diary can help you find what's causing your pain. Purpose:  Outlines what to include in a headache diary and how tracking headaches andpossible triggers can help inform treatment. Goal:  The user will learn how to keep a headache diary to help identify headachetriggers. How to watch the video    Scan the QR code   OR Visit the website    [email or web address]   Current as of: December 13, 2021               Content Version: 13.2  © 2006-2022 [employer]. Care instructions adapted under license by [employer] ([facility]). If you have questions about a medical condition or this instruction, always ask your healthcare professional. Ettie Junes any warranty or liability for your use of this information. Patient Education         Headaches: Avoiding Triggers (01:35)  Your health professional recommends that you watch this short online healthvideo. Learn some common headache triggers so you can plan to avoid the ones that affect you.   Purpose:  Discusses headache triggers, such as strong smells and bright lights, and howto avoid them to manage headaches. Goal:  The user will learn about common headache triggers and the importance ofavoiding them to help prevent headaches. How to watch the video    Scan the QR code   OR Visit the website    [email or web address]   Current as of: December 13, 2021               Content Version: 13.2  © 2006-2022 Healthwise, Incorporated. Care instructions adapted under license by [facility] If you have questions about a medical condition or this instruction, always ask your healthcare professional. [place] [address] any warranty or liability for your use of this information.

## 2022-05-24 ENCOUNTER — NURSE TRIAGE (OUTPATIENT)
Dept: OTHER | Facility: CLINIC | Age: 42
End: 2022-05-24

## 2022-05-24 ENCOUNTER — OFFICE VISIT (OUTPATIENT)
Dept: PRIMARY CARE CLINIC | Age: 42
End: 2022-05-24
Payer: COMMERCIAL

## 2022-05-24 VITALS
TEMPERATURE: 97.3 F | WEIGHT: 142.8 LBS | BODY MASS INDEX: 25.3 KG/M2 | DIASTOLIC BLOOD PRESSURE: 62 MMHG | HEIGHT: 63 IN | SYSTOLIC BLOOD PRESSURE: 110 MMHG | OXYGEN SATURATION: 96 % | HEART RATE: 64 BPM

## 2022-05-24 DIAGNOSIS — R00.1 BRADYCARDIA: ICD-10-CM

## 2022-05-24 DIAGNOSIS — G43.819 OTHER MIGRAINE WITHOUT STATUS MIGRAINOSUS, INTRACTABLE: Primary | ICD-10-CM

## 2022-05-24 PROCEDURE — 99214 OFFICE O/P EST MOD 30 MIN: CPT | Performed by: FAMILY MEDICINE

## 2022-05-24 PROCEDURE — 93000 ELECTROCARDIOGRAM COMPLETE: CPT | Performed by: FAMILY MEDICINE

## 2022-05-24 RX ORDER — ATOGEPANT 60 MG/1
60 TABLET ORAL DAILY
Qty: 30 TABLET | Refills: 2 | Status: SHIPPED | OUTPATIENT
Start: 2022-05-24 | End: 2022-07-14 | Stop reason: SDUPTHER

## 2022-05-24 ASSESSMENT — ENCOUNTER SYMPTOMS
GASTROINTESTINAL NEGATIVE: 1
RESPIRATORY NEGATIVE: 1
EYES NEGATIVE: 1

## 2022-05-24 NOTE — TELEPHONE ENCOUNTER
Received call from [facility] with [employer]. Subjective: Caller states \"11 days ago, they started me on a beta blocker. My apple watch alerted me 8-9 days ago that the heart rate was low for at least 10 minutes. It has gotten as low as 45. Currently 74. \"    Has not taken BB today. Current Symptoms: low heart rate    Onset: 8-9/10    Associated Symptoms: chest pains    Pain Severity: 3/10    Temperature: denies fever    What has been tried: NA    LMP: NA Pregnant: NA    Recommended disposition: See in Office Today    Care advice provided, patient verbalizes understanding; denies any other questions or concerns; instructed to call back for any new or worsening symptoms. Patient/Caller agrees with recommended disposition; writer provided warm transfer to [place] [facility] for appointment scheduling     Attention Provider: Thank you for allowing me to participate in the care of your patient. The patient was connected to triage in response to information provided to the ECC/PSC. Please do not respond through this encounter as the response is not directed to a shared pool.     Reason for Disposition   Patient wants to be seen    Protocols used: HEART RATE AND HEARTBEAT QUESTIONS-ADULT-[place]

## 2022-05-24 NOTE — PROGRESS NOTES
SUBJECTIVE:  Patient ID: Sasha Méndez is a 38 y.o. y.o. female     HPI     Headache: Patient is here for follow up headache. She does not have a headache at this time. Was seen by different doctor Since the last time she was seen here than she decided to switch back to our us  She has been having 6-7 times a month, but she is not sure  Description of Headaches:  Location of pain: right-sided unilateral, left-sided unilateral, bilateral, frontal  Radiation of pain?:none  Character of pain:aching and sharp  Severity of pain: 7 and increase in frequency in the last few months   Accompanying symptoms: photophobia, neck stiffness  Prodromal sx?: none  Rapidity of onset: sudden  Typical duration of individual headache: 2 hours  Are most headaches similar in presentation?  yes  Typical precipitants: none  She has been going through a lot of stress  Make her migraine may be worse  She had 3 migraines in the last 10 days  She felt the beta-blockers to be started  her the 13th of the month caused her to have a slow heart rate she was alarmed by the watch and she was worried and concerned  Her mom is planning to move out and she thinks that would help her through the summer stress  Wanted to try different medication  She  failed in the past Topamax  Past Medical History:   Diagnosis Date    Arthritis     Bipolar affective disorder ([address]     Borderline personality disorder ([address]     IBS (irritable bowel syndrome)     Neck pain     Post hysterectomy menopause syndrome       Past Surgical History:   Procedure Laterality Date    APPENDECTOMY  2007    CHOLECYSTECTOMY  2007    KNEE ARTHROSCOPY Right 2010    torn meniscus    KNEE SURGERY Right 2005    Knee cap    OVARY REMOVAL Right     [facility]    SINUS SURGERY  2000    YENNIFER AND BSO  2008    ovarian cyst, adenomyosis- benign    TUBAL LIGATION  2005    TYMPANOSTOMY TUBE PLACEMENT Bilateral     x 3      Family History   Problem Relation Age of Onset    Hypertension Mother     Mental Retardation Father         bipolar    Mental Retardation Sister         bipolar     Social History     Socioeconomic History    Marital status:      Spouse name: Steph See Number of children: 2    Years of education: None    Highest education level: None   Occupational History    Occupation: day care      Comment: since    Tobacco Use    Smoking status: Never Smoker    Smokeless tobacco: Never Used   Substance and Sexual Activity    Alcohol use: No     Alcohol/week: 0.0 standard drinks    Drug use: No    Sexual activity: Yes     Partners: Male     Comment: M, 2 kids,    Other Topics Concern    None   Social History Narrative        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     FT job    No tobacco      Social Determinants of Health     Financial Resource Strain: Low Risk     Difficulty of Paying Living Expenses: Not hard at all   Food Insecurity: No Food Insecurity    Worried About Running Out of Food in the Last Year: Never true    [address] in the Last Year: Never true   Transportation Needs:     Lack of Transportation (Medical): Not on file    Lack of Transportation (Non-Medical): Not on file   Physical Activity: Insufficiently Active    Days of Exercise per Week: 2 days    Minutes of Exercise per Session: 30 min   Stress: No Stress Concern Present    Feeling of Stress : Not at all   Social Connections: Moderately Isolated    Frequency of Communication with Friends and Family:  Three times a week    Frequency of Social Gatherings with Friends and Family: Once a week    Attends Yarsanism Services: Never    Active Member of Clubs or Organizations: No    Attends Club or Organization Meetings: Never    Marital Status:    Intimate Partner Violence:     Fear of Current or Ex-Partner: Not on file    Emotionally Abused: Not on file    Physically Abused: Not on file    Sexually Abused: Not on file   Housing Stability: Low Risk     Unable to Pay for Housing in the Last Year: No    Number of Places Lived in the Last Year: 1    Unstable Housing in the Last Year: No     Current Outpatient Medications   Medication Sig Dispense Refill    Amantadine (SYMMETREL) 100 MG TABS tablet TAKE 1/2 TABLET BY MOUTH ONCE DAILY FOR 7 DAYS THEN INCREASE TO 1/2 TABLET TWICE DAILY      rizatriptan (MAXALT) 10 MG tablet Take 1 tablet by mouth once as needed for Migraine May repeat in 2 hours if needed 12 tablet 2    lamoTRIgine (LAMICTAL) 25 MG tablet Take 50 mg by mouth daily      lurasidone (LATUDA) 80 MG TABS tablet Take 80 mg by mouth Daily with supper      Cholecalciferol (VITAMIN D) 50 MCG (2000 UT) CAPS capsule Take by mouth      gabapentin (NEURONTIN) 100 MG capsule Take 1 capsule by mouth daily for 30 days. 30 capsule 1    propranolol (INDERAL LA) 60 MG extended release capsule Take 1 capsule by mouth daily (Patient not taking: Reported on 5/24/2022) 30 capsule 3    benztropine (COGENTIN) 0.5 MG tablet Take 0.5 mg by mouth 2 times daily (Patient not taking: Reported on 5/13/2022)      naproxen (NAPROSYN) 500 MG tablet Take 1 tablet by mouth 2 times daily (with meals) (Patient not taking: Reported on 5/24/2022) 60 tablet 0    Multiple Vitamins-Minerals (THERAPEUTIC MULTIVITAMIN-MINERALS) tablet Take 1 tablet by mouth daily (Patient not taking: Reported on 5/24/2022)       No current facility-administered medications for this visit. Allergies   Allergen Reactions    Ultram [Tramadol Hcl]      Hives    Vicodin [Hydrocodone-Acetaminophen] Nausea And Vomiting       Review of Systems   Constitutional: Negative. HENT: Negative. Eyes: Negative. Respiratory: Negative. Cardiovascular: Negative. Gastrointestinal: Negative. Neurological: Positive for headaches. All other systems reviewed and are negative.       OBJECTIVE:  /62 (Site: Right Upper Arm, Position: Sitting, Cuff Size: Medium Adult)   Pulse 64   Temp 97.3 °F (36.3 °C) (Temporal)   Ht 5' 3\" (1.6 m)   Wt 142 lb 12.8 oz (64.8 kg)   SpO2 96%   BMI 25.30 kg/m²     Physical Exam  Vitals reviewed. Eyes:      General: No scleral icterus. Conjunctiva/sclera: Conjunctivae normal.   Neck:      Thyroid: No thyromegaly. Vascular: No JVD. Cardiovascular:      Rate and Rhythm: Normal rate and regular rhythm. Heart sounds: Normal heart sounds. No murmur heard. No friction rub. No gallop. Pulmonary:      Effort: Pulmonary effort is normal.      Breath sounds: Normal breath sounds. No wheezing or rales. Abdominal:      General: Bowel sounds are normal. There is no distension. Palpations: Abdomen is soft. There is no mass. Tenderness: There is no abdominal tenderness. Hernia: No hernia is present. Lymphadenopathy:      Cervical: No cervical adenopathy. Skin:     Findings: No rash. Neurological:      Mental Status: She is alert and oriented to person, place, and time. ASSESSMENT:     Diagnosis Orders   1. Other migraine without status migrainosus, intractable     2.  Bradycardia  EKG 12 lead       PLAN:  See orders  Off Inderal   Trial of new medication preventive migraine Qulipta  Keep diary  Close follow-up

## 2022-07-14 ENCOUNTER — TELEPHONE (OUTPATIENT)
Dept: PRIMARY CARE CLINIC | Age: 42
End: 2022-07-14

## 2022-07-14 ENCOUNTER — OFFICE VISIT (OUTPATIENT)
Dept: PRIMARY CARE CLINIC | Age: 42
End: 2022-07-14
Payer: COMMERCIAL

## 2022-07-14 VITALS
OXYGEN SATURATION: 98 % | WEIGHT: 147.2 LBS | HEART RATE: 78 BPM | DIASTOLIC BLOOD PRESSURE: 80 MMHG | HEIGHT: 63 IN | SYSTOLIC BLOOD PRESSURE: 108 MMHG | BODY MASS INDEX: 26.08 KG/M2 | TEMPERATURE: 98.2 F | RESPIRATION RATE: 14 BRPM

## 2022-07-14 DIAGNOSIS — F31.9 BIPOLAR 1 DISORDER (HCC): ICD-10-CM

## 2022-07-14 DIAGNOSIS — G43.819 OTHER MIGRAINE WITHOUT STATUS MIGRAINOSUS, INTRACTABLE: ICD-10-CM

## 2022-07-14 DIAGNOSIS — R68.82 DECREASED LIBIDO: Primary | ICD-10-CM

## 2022-07-14 PROCEDURE — 99214 OFFICE O/P EST MOD 30 MIN: CPT | Performed by: FAMILY MEDICINE

## 2022-07-14 RX ORDER — FLIBANSERIN 100 MG/1
TABLET, FILM COATED ORAL
Qty: 30 TABLET | Refills: 2 | Status: SHIPPED | OUTPATIENT
Start: 2022-07-14

## 2022-07-14 RX ORDER — LAMOTRIGINE 100 MG/1
1 TABLET ORAL 2 TIMES DAILY
COMMUNITY
Start: 2022-06-27

## 2022-07-14 RX ORDER — ATOGEPANT 60 MG/1
60 TABLET ORAL DAILY
Qty: 30 TABLET | Refills: 2 | Status: SHIPPED | OUTPATIENT
Start: 2022-07-14 | End: 2022-08-17 | Stop reason: SDUPTHER

## 2022-07-14 RX ORDER — GABAPENTIN 400 MG/1
1 CAPSULE ORAL 2 TIMES DAILY
COMMUNITY
Start: 2022-06-27

## 2022-07-14 SDOH — ECONOMIC STABILITY: FOOD INSECURITY: WITHIN THE PAST 12 MONTHS, THE FOOD YOU BOUGHT JUST DIDN'T LAST AND YOU DIDN'T HAVE MONEY TO GET MORE.: NEVER TRUE

## 2022-07-14 SDOH — ECONOMIC STABILITY: FOOD INSECURITY: WITHIN THE PAST 12 MONTHS, YOU WORRIED THAT YOUR FOOD WOULD RUN OUT BEFORE YOU GOT MONEY TO BUY MORE.: NEVER TRUE

## 2022-07-14 ASSESSMENT — ENCOUNTER SYMPTOMS
RESPIRATORY NEGATIVE: 1
GASTROINTESTINAL NEGATIVE: 1
EYES NEGATIVE: 1

## 2022-07-14 ASSESSMENT — SOCIAL DETERMINANTS OF HEALTH (SDOH): HOW HARD IS IT FOR YOU TO PAY FOR THE VERY BASICS LIKE FOOD, HOUSING, MEDICAL CARE, AND HEATING?: NOT HARD AT ALL

## 2022-07-14 NOTE — PROGRESS NOTES
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     FT job    No tobacco      Social Determinants of Health     Financial Resource Strain: Low Risk     Difficulty of Paying Living Expenses: Not hard at all   Food Insecurity: No Food Insecurity    Worried About [address] in the Last Year: Never true    [address] in the Last Year: Never true   Transportation Needs:     Lack of Transportation (Medical): Not on file    Lack of Transportation (Non-Medical):  Not on file   Physical Activity:     Days of Exercise per Week: Not on file    Minutes of Exercise per Session: Not on file   Stress:     Feeling of Stress : Not on file   Social Connections:     Frequency of Communication with Friends and Family: Not on file    Frequency of Social Gatherings with Friends and Family: Not on file    Attends Uatsdin Services: Not on file    Active Member of [employer] or Organizations: Not on file    Attends Club or Organization Meetings: Not on file    Marital Status: Not on file   Intimate Partner Violence:     Fear of Current or Ex-Partner: Not on file    Emotionally Abused: Not on file    Physically Abused: Not on file    Sexually Abused: Not on file   Housing Stability:     Unable to Pay for Housing in the Last Year: Not on file    Number of [place] in the Last Year: Not on file    Unstable Housing in the Last Year: Not on file     Current Outpatient Medications   Medication Sig Dispense Refill    lamoTRIgine (LAMICTAL) 100 MG tablet Take 1 tablet by mouth in the morning and at bedtime      gabapentin (NEURONTIN) 400 MG capsule Take 1 capsule by mouth in the morning and at bedtime.  Atogepant (QULIPTA) 60 MG TABS Take 60 mg by mouth daily 30 tablet 2    Amantadine (SYMMETREL) 100 MG TABS tablet TAKE 1/2 TABLET BY MOUTH ONCE DAILY FOR 7 DAYS THEN INCREASE TO 1/2 TABLET TWICE DAILY      rizatriptan (MAXALT) 10 MG tablet Take 1 tablet by mouth once as needed for Migraine May repeat in 2 hours if needed 12 tablet 2    lurasidone (LATUDA) 80 MG TABS tablet Take 80 mg by mouth Daily with supper      Cholecalciferol (VITAMIN D) 50 MCG (2000 UT) CAPS capsule Take by mouth      benztropine (COGENTIN) 0.5 MG tablet Take 0.5 mg by mouth 2 times daily (Patient not taking: Reported on 5/13/2022)      lamoTRIgine (LAMICTAL) 25 MG tablet Take 50 mg by mouth daily      gabapentin (NEURONTIN) 100 MG capsule Take 1 capsule by mouth daily for 30 days. 30 capsule 1     No current facility-administered medications for this visit. Allergies   Allergen Reactions    Ultram [Tramadol Hcl]      Hives    Vicodin [Hydrocodone-Acetaminophen] Nausea And Vomiting       Review of Systems   Constitutional: Negative. HENT: Negative. Eyes: Negative. Respiratory: Negative. Cardiovascular: Negative. Gastrointestinal: Negative. Neurological: Positive for headaches. All other systems reviewed and are negative. OBJECTIVE:  /80 (Site: Right Upper Arm, Position: Sitting, Cuff Size: Small Adult)   Pulse 78   Temp 98.2 °F (36.8 °C) (Temporal)   Resp 14   Ht 5' 3\" (1.6 m)   Wt 147 lb 3.2 oz (66.8 kg)   SpO2 98%   BMI 26.08 kg/m²     Physical Exam  Vitals reviewed. Eyes:      General: No scleral icterus. Conjunctiva/sclera: Conjunctivae normal.   Neck:      Thyroid: No thyromegaly. Vascular: No JVD. Cardiovascular:      Rate and Rhythm: Normal rate and regular rhythm. Heart sounds: Normal heart sounds. No murmur heard. No friction rub. No gallop. Pulmonary:      Effort: Pulmonary effort is normal.      Breath sounds: Normal breath sounds.  No wheezing or rales. Abdominal:      General: Bowel sounds are normal. There is no distension. Palpations: Abdomen is soft. There is no mass. Tenderness: There is no abdominal tenderness. Hernia: No hernia is present. Lymphadenopathy:      Cervical: No cervical adenopathy. Skin:     Findings: No rash. Neurological:      Mental Status: She is alert and oriented to person, place, and time. ASSESSMENT:   Diagnosis Orders   1. Decreased libido     2. Other migraine without status migrainosus, intractable     3. Bipolar 1 disorder (HCC)           PLAN:  See orders,  [place] to improved her libido  Recommend sex therapist   Information is given about libido in women.

## 2022-07-14 NOTE — PATIENT INSTRUCTIONS
Patient Education        Decreased Female Libido: Care Instructions  Overview     A decreased libido means you have less desire to have sex. It may be hard foryou to get sexually excited or have an orgasm. This is a common problem. Many things can cause this problem. Often, there's more than one cause. In some cases, your sex life may be affected by normal changes in your body. These include having a baby and going through menopause. This problem can also be caused by a medicine you take. Or your vagina could be dry. Or you might have a vaginal infection. And sometimes, there may be issuesbetween you and your partner that affect your sex drive. Your doctor may do tests to see if you have a vaginal infection. The doctor may ask you about your sex life. It's important to trust your doctor. Try to be honest about your feelings toward sex. Your sex partner may want to be involvedwith your treatment. Many people can have a healthy sex drive again after the problem is found. Medicines for depression can affect your sex drive. If you are taking any, askyour doctor about changing them. Follow-up care is a key part of your treatment and safety. Be sure to make and go to all appointments, and call your doctor if you are having problems. It's also a good idea to know your test results and keep alist of the medicines you take. How can you care for yourself at home?  Tell your doctor about all the medicines you take. This includes vitamins, supplements, and herbal remedies.  Use vaginal lubricant during sex. Examples are Astroglide and K-Y Jelly.  Increase the time you and your partner spend touching each other before sex. This is called foreplay.  Before sex, take a warm bath. This can relax you and reduce stress or anxiety.  Try types of sexual activity other than intercourse.  Be honest with your sex partner about what you enjoy during sex. When should you call for help?   Watch closely for changes in your health, and be sure to contact your doctor if you have any problems. Where can you learn more? Go to [email or web address] and sign in to your [facility] account. Enter B774 in the [facility] box to learn more about \"Decreased Female Libido: Care Instructions. \"     If you do not have an account, please click on the \"Sign Up Now\" link. Current as of: November 22, 2021               Content Version: 13.3  © 2006-2022 [employer] [facility]. Care instructions adapted under license by [employer]. If you have questions about a medical condition or this instruction, always ask your healthcare professional. [address] any warranty or liability for your use of this information. Oriented - self; Oriented - place; Oriented - time

## 2022-07-18 ENCOUNTER — PATIENT MESSAGE (OUTPATIENT)
Dept: PRIMARY CARE CLINIC | Age: 42
End: 2022-07-18

## 2022-07-18 NOTE — TELEPHONE ENCOUNTER
From: Danette Clark  To: Dr. Anna Marie Cleaning: 7/18/2022 4:15 PM EDT  Subject: Addyi    I just wanted to let you know that I cant get the addyi it costs to much money. Im disappointed and unsure what else to do.

## 2022-08-17 NOTE — TELEPHONE ENCOUNTER
Medication:   Requested Prescriptions     Pending Prescriptions Disp Refills    Atogepant (QULIPTA) 60 MG TABS 30 tablet 2     Sig: Take 60 mg by mouth daily        Last Filled:      Patient Phone Number: [phone] (home)     Last appt: 7/14/2022   Next appt: Visit date not found    Last OARRS: No flowsheet data found. Preferred Pharmacy:   [facility] [id number] [facility], [address] [facility] [phone] Peggy Cheadle [phone]  [address] [facility]  Phone: [phone] Fax: [phone]    Medication:   Requested Prescriptions     Pending Prescriptions Disp Refills    Atogepant (QULIPTA) 60 MG TABS 30 tablet 2     Sig: Take 60 mg by mouth daily        Last Filled:      Patient Phone Number: [phone] (home)     Last appt: 7/14/2022   Next appt: Visit date not found    Last OARRS: No flowsheet data found.     Preferred Pharmacy:   [facility] [id number]  [facility] [address]  Phone: [phone] Fax: [phone]

## 2022-08-18 RX ORDER — ATOGEPANT 60 MG/1
60 TABLET ORAL DAILY
Qty: 30 TABLET | Refills: 2 | Status: SHIPPED | OUTPATIENT
Start: 2022-08-18

## 2022-08-22 ENCOUNTER — TELEPHONE (OUTPATIENT)
Dept: ADMINISTRATIVE | Age: 42
End: 2022-08-22

## 2022-08-22 NOTE — TELEPHONE ENCOUNTER
PA submitted VIA [facility] for  Qulipta 60MG tablets  Key: [id number] - PA Case ID: [id number] - Rx #: [id number]  PENDING    PA Case: [id number], Status: Approved, Coverage Starts on: 8/22/2022 12:00:00 AM, Coverage Ends on: 8/22/2023 12:00:00 AM.

## 2022-11-05 DIAGNOSIS — G43.819 OTHER MIGRAINE WITHOUT STATUS MIGRAINOSUS, INTRACTABLE: ICD-10-CM

## 2022-11-07 RX ORDER — ATOGEPANT 60 MG/1
60 TABLET ORAL DAILY
Qty: 30 TABLET | Refills: 2 | Status: SHIPPED | OUTPATIENT
Start: 2022-11-07

## 2022-11-07 RX ORDER — RIZATRIPTAN BENZOATE 10 MG/1
10 TABLET ORAL
Qty: 12 TABLET | Refills: 2 | Status: SHIPPED | OUTPATIENT
Start: 2022-11-07 | End: 2022-11-07

## 2022-11-07 NOTE — TELEPHONE ENCOUNTER
Medication:   Requested Prescriptions     Pending Prescriptions Disp Refills    rizatriptan (MAXALT) 10 MG tablet 12 tablet 2     Sig: Take 1 tablet by mouth once as needed for Migraine May repeat in 2 hours if needed    Atogepant (QULIPTA) 60 MG TABS 30 tablet 2     Sig: Take 60 mg by mouth daily     Last Filled: 5.13.22 8.18.22    Last appt: 7/14/2022   Next appt: Visit date not found    Last OARRS: No flowsheet data found.

## 2023-01-16 ENCOUNTER — OFFICE VISIT (OUTPATIENT)
Dept: PRIMARY CARE CLINIC | Age: 43
End: 2023-01-16
Payer: COMMERCIAL

## 2023-01-16 VITALS
SYSTOLIC BLOOD PRESSURE: 110 MMHG | RESPIRATION RATE: 14 BRPM | OXYGEN SATURATION: 99 % | BODY MASS INDEX: 25.51 KG/M2 | DIASTOLIC BLOOD PRESSURE: 80 MMHG | WEIGHT: 144 LBS | TEMPERATURE: 97.6 F

## 2023-01-16 DIAGNOSIS — G43.819 OTHER MIGRAINE WITHOUT STATUS MIGRAINOSUS, INTRACTABLE: Primary | ICD-10-CM

## 2023-01-16 DIAGNOSIS — M54.2 NECK PAIN: ICD-10-CM

## 2023-01-16 PROCEDURE — 99214 OFFICE O/P EST MOD 30 MIN: CPT | Performed by: FAMILY MEDICINE

## 2023-01-16 RX ORDER — RIZATRIPTAN BENZOATE 10 MG/1
10 TABLET ORAL
Qty: 12 TABLET | Refills: 2 | Status: SHIPPED | OUTPATIENT
Start: 2023-01-16 | End: 2023-01-16

## 2023-01-16 RX ORDER — CYCLOBENZAPRINE HCL 5 MG
5 TABLET ORAL NIGHTLY PRN
Qty: 30 TABLET | Refills: 0 | Status: SHIPPED | OUTPATIENT
Start: 2023-01-16 | End: 2023-01-26

## 2023-01-16 RX ORDER — ATOGEPANT 60 MG/1
60 TABLET ORAL DAILY
Qty: 30 TABLET | Refills: 5 | Status: SHIPPED | OUTPATIENT
Start: 2023-01-16

## 2023-01-16 RX ORDER — METAXALONE 800 MG/1
800 TABLET ORAL 3 TIMES DAILY
Qty: 30 TABLET | Refills: 1 | Status: SHIPPED | OUTPATIENT
Start: 2023-01-16 | End: 2023-01-26

## 2023-01-16 ASSESSMENT — PATIENT HEALTH QUESTIONNAIRE - PHQ9
SUM OF ALL RESPONSES TO PHQ9 QUESTIONS 1 & 2: 0
2. FEELING DOWN, DEPRESSED OR HOPELESS: 0
10. IF YOU CHECKED OFF ANY PROBLEMS, HOW DIFFICULT HAVE THESE PROBLEMS MADE IT FOR YOU TO DO YOUR WORK, TAKE CARE OF THINGS AT HOME, OR GET ALONG WITH OTHER PEOPLE: 0
SUM OF ALL RESPONSES TO PHQ QUESTIONS 1-9: 0
5. POOR APPETITE OR OVEREATING: 0
3. TROUBLE FALLING OR STAYING ASLEEP: 0
8. MOVING OR SPEAKING SO SLOWLY THAT OTHER PEOPLE COULD HAVE NOTICED. OR THE OPPOSITE, BEING SO FIGETY OR RESTLESS THAT YOU HAVE BEEN MOVING AROUND A LOT MORE THAN USUAL: 0
9. THOUGHTS THAT YOU WOULD BE BETTER OFF DEAD, OR OF HURTING YOURSELF: 0
SUM OF ALL RESPONSES TO PHQ QUESTIONS 1-9: 0
SUM OF ALL RESPONSES TO PHQ QUESTIONS 1-9: 0
4. FEELING TIRED OR HAVING LITTLE ENERGY: 0
7. TROUBLE CONCENTRATING ON THINGS, SUCH AS READING THE NEWSPAPER OR WATCHING TELEVISION: 0
SUM OF ALL RESPONSES TO PHQ QUESTIONS 1-9: 0
6. FEELING BAD ABOUT YOURSELF - OR THAT YOU ARE A FAILURE OR HAVE LET YOURSELF OR YOUR FAMILY DOWN: 0
1. LITTLE INTEREST OR PLEASURE IN DOING THINGS: 0

## 2023-01-16 NOTE — PROGRESS NOTES
Subjective:      Patient ID: Massiel Alas is a 38 y.o. female. Headache: Patient is here for follow up headache. She does not have a headache at this time. Was seen by different doctor Since the last time she was seen here than she decided to switch back to our us  She has been having 6-7 times a month, but she is not sure  Description of Headaches:  Location of pain: right-sided unilateral, left-sided unilateral, bilateral, frontal  Radiation of pain?:none  Character of pain:aching and sharp  Severity of pain: 7 and increase in frequency in the last few months   Accompanying symptoms: photophobia, neck stiffness  Prodromal sx?: none  Rapidity of onset: sudden  Typical duration of individual headache: 2 hours  Are most headaches similar in presentation?  yes  Typical precipitants: none  She is doing a lot better on DayQuil abdominal she gets like 1-2 headache a month  She noticed like something neck pain with trocar  She is trying to focus on some exercise of the neck she thinks muscle relaxer had helped her in the past  Patient to with bipolar on multiple medications followed by psychiatry  Past Medical History:   Diagnosis Date    Arthritis     Bipolar affective disorder ([facility] [address]     Borderline personality disorder ([facility] [address]     IBS (irritable bowel syndrome)     Neck pain     Post hysterectomy menopause syndrome       Past Surgical History:   Procedure Laterality Date    APPENDECTOMY  2007    CHOLECYSTECTOMY  2007    KNEE ARTHROSCOPY Right 2010    torn meniscus    KNEE SURGERY Right 2005    Knee cap    OOPHORECTOMY Right     PCOS    SINUS SURGERY  2000    TOTAL ABDOMINAL HYSTERECTOMY W/ BILATERAL SALPINGOOPHORECTOMY  2008    ovarian cyst, adenomyosis- benign    TUBAL LIGATION  2005    TYMPANOSTOMY TUBE PLACEMENT Bilateral     x 3      Family History   Problem Relation Age of Onset    Hypertension Mother     Mental Retardation Father         bipolar    Mental Retardation Sister         bipolar     Social History     Socioeconomic History    Marital status:      Spouse name: Igor    Number of children: 2   Occupational History    Occupation: day care      Comment: since    Tobacco Use    Smoking status: Never    Smokeless tobacco: Never   Substance and Sexual Activity    Alcohol use: No     Alcohol/week: 0.0 standard drinks    Drug use: No    Sexual activity: Yes     Partners: Male     Comment: M, 2 kids,    Social History Narrative        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     FT job    No tobacco      Social Determinants of Health     Financial Resource Strain: Low Risk     Difficulty of Paying Living Expenses: Not hard at all   Food Insecurity: No Food Insecurity    Worried About Running Out of Food in the Last Year: Never true    [address] in the Last Year: Never true     Current Outpatient Medications   Medication Sig Dispense Refill    Atogepant (QULIPTA) 60 MG TABS Take 60 mg by mouth daily 30 tablet 2    lamoTRIgine (LAMICTAL) 100 MG tablet Take 1 tablet by mouth in the morning and at bedtime 100 mg morning 300 mg night      gabapentin (NEURONTIN) 400 MG capsule Take 1 capsule by mouth in the morning and at bedtime. lurasidone (LATUDA) 80 MG TABS tablet Take 80 mg by mouth Daily with supper 60 mg ance a day      Cholecalciferol (VITAMIN D) 50 MCG (2000 UT) CAPS capsule Take by mouth      rizatriptan (MAXALT) 10 MG tablet Take 1 tablet by mouth once as needed for Migraine May repeat in 2 hours if needed 12 tablet 2    Flibanserin (ADDYI) 100 MG TABS Take it daily (Patient not taking: Reported on 1/16/2023) 30 tablet 2    Amantadine (SYMMETREL) 100 MG TABS tablet TAKE 1/2 TABLET BY MOUTH ONCE DAILY FOR 7 DAYS THEN INCREASE TO 1/2 TABLET TWICE DAILY (Patient not taking: Reported on 1/16/2023)       No current facility-administered medications for this visit.      Allergies   Allergen Reactions    Ultram [Tramadol Hcl]      Hives    Vicodin [Hydrocodone-Acetaminophen] Nausea And Vomiting           Objective:   Physical Exam   [vitalsreviewed. Constitutional: She is oriented to person, place, and time. She appears well-developed and well-nourished. No distress. HENT:   Head: Normocephalic and atraumatic. Right Ear: External ear normal.   Left Ear: External ear normal.   Nose: Nose normal.   Mouth/Throat: Oropharynx is clear and moist. No oropharyngeal exudate. Eyes: Conjunctivae and EOM are normal. Pupils are equal, round, and reactive to light. Right eye exhibits no discharge. Left eye exhibits no discharge. No scleral icterus. Neck: Normal range of motion. Neck supple. No JVD present. No tracheal deviation present. No thyromegaly present. Cardiovascular: Normal rate, regular rhythm, normal heart sounds and intact distal pulses. Exam reveals no gallop and no friction rub. No murmur heard. Pulmonary/Chest: Effort normal and breath sounds normal. No stridor. No respiratory distress. She has no wheezes. She has no rales. She exhibits no tenderness. Abdominal: Soft. Bowel sounds are normal. She exhibits no distension and no mass. Tenderness over the left upper quadrant  She has no rebound and no guarding. Musculoskeletal: Normal range of motion. She exhibits no edema and no tenderness. Lymphadenopathy:     She has no cervical adenopathy. Neurological: She is alert and oriented to person, place, and time. She has normal reflexes. No cranial nerve deficit. She exhibits normal muscle tone. Coordination normal.   Skin: Skin is warm and dry. No rash noted. She is not diaphoretic. No erythema. No pallor. Psychiatric: She has a normal mood and affect. Her behavior is normal. Judgment and thought content normal.       Assessment:         Diagnosis Orders   1. Other migraine without status migrainosus, intractable  rizatriptan (MAXALT) 10 MG tablet      2.  Neck pain  metaxalone (SKELAXIN) 800 MG tablet              Plan:      See orders  Continue with Jennyfer Mccall  Exercise for the  Muscle relaxant  She can use OTC Voltaren gel  Keep monitoring migraine  Overall doing better

## 2023-01-18 ENCOUNTER — TELEPHONE (OUTPATIENT)
Dept: ADMINISTRATIVE | Age: 43
End: 2023-01-18

## 2023-01-18 NOTE — TELEPHONE ENCOUNTER
PA submitted VIA [facility] for  Metaxalone 800MG tablets Key: [id number] - PA Case ID: [id number] - Rx #: [id number] PENDING      Approved today  PA Case: [id number], Status: Approved, Coverage Starts on: 1/18/2023 12:00:00 AM, Coverage Ends on: 1/18/2024 12:00:00 AM.

## 2023-04-05 ENCOUNTER — TELEPHONE (OUTPATIENT)
Dept: ADMINISTRATIVE | Age: 43
End: 2023-04-05

## 2023-05-08 DIAGNOSIS — G43.819 OTHER MIGRAINE WITHOUT STATUS MIGRAINOSUS, INTRACTABLE: ICD-10-CM

## 2023-05-08 RX ORDER — RIZATRIPTAN BENZOATE 10 MG/1
10 TABLET ORAL
Qty: 12 TABLET | Refills: 2 | Status: SHIPPED | OUTPATIENT
Start: 2023-05-08 | End: 2023-05-08

## 2023-05-08 NOTE — TELEPHONE ENCOUNTER
Medication:   Requested Prescriptions     Pending Prescriptions Disp Refills    rizatriptan (MAXALT) 10 MG tablet 12 tablet 2     Sig: Take 1 tablet by mouth once as needed for Migraine May repeat in 2 hours if needed        Last Filled:  1/6/23    Patient Phone Number: [phone] (home)     Last appt: 1/16/2023   Next appt: Visit date not found    Last OARRS: No flowsheet data found.

## 2023-08-29 ENCOUNTER — OFFICE VISIT (OUTPATIENT)
Dept: PRIMARY CARE CLINIC | Age: 43
End: 2023-08-29
Payer: COMMERCIAL

## 2023-08-29 VITALS
TEMPERATURE: 97.3 F | HEART RATE: 71 BPM | HEIGHT: 63 IN | RESPIRATION RATE: 13 BRPM | BODY MASS INDEX: 25.52 KG/M2 | DIASTOLIC BLOOD PRESSURE: 88 MMHG | WEIGHT: 144 LBS | OXYGEN SATURATION: 99 % | SYSTOLIC BLOOD PRESSURE: 120 MMHG

## 2023-08-29 DIAGNOSIS — R07.9 CHEST PAIN, UNSPECIFIED TYPE: Primary | ICD-10-CM

## 2023-08-29 DIAGNOSIS — K21.9 GASTROESOPHAGEAL REFLUX DISEASE, UNSPECIFIED WHETHER ESOPHAGITIS PRESENT: ICD-10-CM

## 2023-08-29 PROCEDURE — 93000 ELECTROCARDIOGRAM COMPLETE: CPT | Performed by: FAMILY MEDICINE

## 2023-08-29 PROCEDURE — 99214 OFFICE O/P EST MOD 30 MIN: CPT | Performed by: FAMILY MEDICINE

## 2023-08-29 RX ORDER — OMEPRAZOLE 40 MG/1
40 CAPSULE, DELAYED RELEASE ORAL 2 TIMES DAILY
Qty: 60 CAPSULE | Refills: 1 | Status: SHIPPED | OUTPATIENT
Start: 2023-08-29

## 2023-08-29 SDOH — ECONOMIC STABILITY: FOOD INSECURITY: WITHIN THE PAST 12 MONTHS, THE FOOD YOU BOUGHT JUST DIDN'T LAST AND YOU DIDN'T HAVE MONEY TO GET MORE.: NEVER TRUE

## 2023-08-29 SDOH — ECONOMIC STABILITY: INCOME INSECURITY: HOW HARD IS IT FOR YOU TO PAY FOR THE VERY BASICS LIKE FOOD, HOUSING, MEDICAL CARE, AND HEATING?: NOT HARD AT ALL

## 2023-08-29 SDOH — ECONOMIC STABILITY: FOOD INSECURITY: WITHIN THE PAST 12 MONTHS, YOU WORRIED THAT YOUR FOOD WOULD RUN OUT BEFORE YOU GOT MONEY TO BUY MORE.: NEVER TRUE

## 2023-08-29 ASSESSMENT — ENCOUNTER SYMPTOMS
RESPIRATORY NEGATIVE: 1
GASTROINTESTINAL NEGATIVE: 1
EYES NEGATIVE: 1

## 2023-08-29 NOTE — PROGRESS NOTES
SUBJECTIVE:  Patient ID: Neto Arciniega is a 41 y.o. y.o. female     HPI   Chest Pain: Patient complains of chest pain. Onset was 2 days ago, with unchanged course since that time. The patient describes the pain as intermittent, costochondral in nature, does not radiate. Patient rates pain as a 3/10 in intensity. Associated symptoms are chest pain and occasional SOB . Aggravating factors are none. Alleviating factors are: none. Patient's cardiac risk factors are none. Patient's risk factors for DVT/PE: none. Previous cardiac testing: none.   Patient under a lot of stress  She tried Prilosec Tums over-the-counter it did help some  Past Medical History:   Diagnosis Date    Arthritis     Bipolar affective disorder ([address])     Borderline personality disorder ([address])     IBS (irritable bowel syndrome)     Neck pain     Post hysterectomy menopause syndrome       Past Surgical History:   Procedure Laterality Date    APPENDECTOMY  2007    CHOLECYSTECTOMY  2007    KNEE ARTHROSCOPY Right 2010    torn meniscus    KNEE SURGERY Right 2005    Knee cap    OVARY REMOVAL Right     PCOS    SINUS SURGERY  2000    YENNIFER AND BSO (CERVIX REMOVED)  2008    ovarian cyst, adenomyosis- benign    TUBAL LIGATION  2005    TYMPANOSTOMY TUBE PLACEMENT Bilateral     x 3      Family History   Problem Relation Age of Onset    Hypertension Mother     Mental Retardation Father         bipolar    Mental Retardation Sister         bipolar     Social History     Socioeconomic History    Marital status:      Spouse name: Igor    Number of children: 2    Years of education: None    Highest education level: None   Occupational History    Occupation: day care      Comment: since    Tobacco Use    Smoking status: Never    Smokeless tobacco: Never   Substance and Sexual Activity    Alcohol use: No     Alcohol/week: 0.0 standard drinks    Drug use: No    Sexual activity: Yes     Partners: Male     Comment: M, 2 kids,    Social History

## 2023-09-15 ENCOUNTER — TELEPHONE (OUTPATIENT)
Dept: PRIMARY CARE CLINIC | Age: 43
End: 2023-09-15

## 2023-09-15 NOTE — TELEPHONE ENCOUNTER
----- Message from Cory Kauffman sent at 9/15/2023 11:02 AM EDT -----  Subject: Message to Provider    QUESTIONS  Information for Provider? Pt would like to schedule a pre op appt for   right ankle Achilles tendon surgery scheduled on 11/1. Pt was given orders   for labwork on 08/29 but has not been able to have it done yet. Pt would   like to know if it is okay to wait until her pre op appt to have that   bloodwork done? Please call pt to advise and schedule pre op appt.   ---------------------------------------------------------------------------  --------------  Aishwarya Welsh [facility]  [phone]; OK to leave message on voicemail  ---------------------------------------------------------------------------  --------------  SCRIPT ANSWERS  Relationship to Patient? Self  Do you have questions for your provider that need to be answered prior to   scheduling your pre-op appointment?  Yes

## 2023-10-04 DIAGNOSIS — R07.9 CHEST PAIN, UNSPECIFIED TYPE: ICD-10-CM

## 2023-10-04 LAB
ALBUMIN SERPL-MCNC: 4.8 G/DL (ref 3.4–5)
ALP SERPL-CCNC: 128 U/L (ref 40–129)
ALT SERPL-CCNC: 16 U/L (ref 10–40)
ANION GAP SERPL CALCULATED.3IONS-SCNC: 12 MMOL/L (ref 3–16)
AST SERPL-CCNC: 19 U/L (ref 15–37)
BILIRUB DIRECT SERPL-MCNC: <0.2 MG/DL (ref 0–0.3)
BILIRUB INDIRECT SERPL-MCNC: NORMAL MG/DL (ref 0–1)
BILIRUB SERPL-MCNC: 0.4 MG/DL (ref 0–1)
BUN SERPL-MCNC: 13 MG/DL (ref 7–20)
CALCIUM SERPL-MCNC: 9.9 MG/DL (ref 8.3–10.6)
CHLORIDE SERPL-SCNC: 104 MMOL/L (ref 99–110)
CHOLEST SERPL-MCNC: 231 MG/DL (ref 0–199)
CO2 SERPL-SCNC: 26 MMOL/L (ref 21–32)
CREAT SERPL-MCNC: 0.8 MG/DL (ref 0.6–1.1)
CRP SERPL HS-MCNC: 2.28 MG/L (ref 0.16–3)
DEPRECATED RDW RBC AUTO: 13.4 % (ref 12.4–15.4)
GFR SERPLBLD CREATININE-BSD FMLA CKD-EPI: >60 ML/MIN/{1.73_M2}
GLUCOSE SERPL-MCNC: 92 MG/DL (ref 70–99)
HCT VFR BLD AUTO: 40.5 % (ref 36–48)
HDLC SERPL-MCNC: 75 MG/DL (ref 40–60)
HGB BLD-MCNC: 14 G/DL (ref 12–16)
LDLC SERPL CALC-MCNC: 135 MG/DL
MCH RBC QN AUTO: 29.4 PG (ref 26–34)
MCHC RBC AUTO-ENTMCNC: 34.5 G/DL (ref 31–36)
MCV RBC AUTO: 85.4 FL (ref 80–100)
PLATELET # BLD AUTO: 274 K/UL (ref 135–450)
PMV BLD AUTO: 7.4 FL (ref 5–10.5)
POTASSIUM SERPL-SCNC: 4 MMOL/L (ref 3.5–5.1)
PROT SERPL-MCNC: 7.2 G/DL (ref 6.4–8.2)
RBC # BLD AUTO: 4.75 M/UL (ref 4–5.2)
SODIUM SERPL-SCNC: 142 MMOL/L (ref 136–145)
TRIGL SERPL-MCNC: 105 MG/DL (ref 0–150)
TSH SERPL DL<=0.005 MIU/L-ACNC: 1.77 UIU/ML (ref 0.27–4.2)
VLDLC SERPL CALC-MCNC: 21 MG/DL
WBC # BLD AUTO: 8.2 K/UL (ref 4–11)

## 2023-10-05 ENCOUNTER — OFFICE VISIT (OUTPATIENT)
Dept: PRIMARY CARE CLINIC | Age: 43
End: 2023-10-05

## 2023-10-05 VITALS
BODY MASS INDEX: 29.09 KG/M2 | HEIGHT: 63 IN | WEIGHT: 164.2 LBS | OXYGEN SATURATION: 95 % | TEMPERATURE: 97.6 F | SYSTOLIC BLOOD PRESSURE: 110 MMHG | DIASTOLIC BLOOD PRESSURE: 80 MMHG | HEART RATE: 79 BPM

## 2023-10-05 DIAGNOSIS — K21.9 GASTROESOPHAGEAL REFLUX DISEASE, UNSPECIFIED WHETHER ESOPHAGITIS PRESENT: ICD-10-CM

## 2023-10-05 DIAGNOSIS — Z01.818 PRE-OP EXAMINATION: Primary | ICD-10-CM

## 2023-10-05 DIAGNOSIS — G43.819 OTHER MIGRAINE WITHOUT STATUS MIGRAINOSUS, INTRACTABLE: ICD-10-CM

## 2023-10-05 DIAGNOSIS — F31.9 BIPOLAR 1 DISORDER (HCC): ICD-10-CM

## 2023-10-05 DIAGNOSIS — M76.61 ACHILLES TENDINITIS OF RIGHT LOWER EXTREMITY: ICD-10-CM

## 2023-10-05 RX ORDER — LAMOTRIGINE 150 MG/1
300 TABLET ORAL
COMMUNITY
Start: 2023-08-21

## 2023-10-05 RX ORDER — ESCITALOPRAM OXALATE 10 MG/1
TABLET ORAL
COMMUNITY
Start: 2023-08-21

## 2023-10-05 ASSESSMENT — ENCOUNTER SYMPTOMS
ALLERGIC/IMMUNOLOGIC NEGATIVE: 1
EYES NEGATIVE: 1
GASTROINTESTINAL NEGATIVE: 1
RESPIRATORY NEGATIVE: 1

## 2023-10-05 NOTE — PROGRESS NOTES
SUBJECTIVE:  Gilma Benitez is a 41 y.o. female who presents for evaluation for pre op for right foot Gerry tendon repair. The pain is described as constant and is 6/10 in intensity. Onset was several months ago. Symptoms have been gradually worsening since. Aggravating factors:weight bearing. Alleviating factors: rest. Associated symptoms: pain. The patient denies chest pain or SOB, no fever or chills, no ST or cough, no N/V/D . Review of Systems   Constitutional: Negative. HENT: Negative. Eyes: Negative. Respiratory: Negative. Cardiovascular: Negative. Gastrointestinal: Negative. Endocrine: Negative. Genitourinary: Negative. Musculoskeletal:  Positive for arthralgias. Allergic/Immunologic: Negative. Neurological: Negative. Hematological: Negative. Psychiatric/Behavioral: Negative. All other systems reviewed and are negative.      Past Medical History:   Diagnosis Date    ADHD (attention deficit hyperactivity disorder) 2023    Anxiety     Arthritis     Bipolar affective disorder ([address])     Borderline personality disorder ([address])     Depression     bipolar disorder    Headache     IBS (irritable bowel syndrome)     Neck pain     Post hysterectomy menopause syndrome       Patient Active Problem List    Diagnosis Date Noted    Myofascial pain syndrome 04/25/2013    Back pain 04/01/2013    Post hysterectomy menopause syndrome     Suicide attempt ([address]) 03/27/2010    Intentional drug overdose ([address]) 03/27/2010    Bipolar disorder ([address]) 03/27/2010    Tachycardia 03/27/2010    Anxiety state 03/24/2010    Acute sinusitis 02/02/2010    Bipolar disorder, now depressed ([address]) 11/14/2009    Allergic rhinitis due to pollen 09/08/2009      Past Surgical History:   Procedure Laterality Date    APPENDECTOMY  2007    CHOLECYSTECTOMY  2007    HYSTERECTOMY, VAGINAL      KNEE ARTHROSCOPY Right 2010    torn meniscus    KNEE SURGERY Right 2005    Knee cap    OVARY REMOVAL Right     PCOS    SINUS

## 2023-10-09 DIAGNOSIS — G43.819 OTHER MIGRAINE WITHOUT STATUS MIGRAINOSUS, INTRACTABLE: ICD-10-CM

## 2023-10-09 RX ORDER — RIZATRIPTAN BENZOATE 10 MG/1
10 TABLET ORAL
Qty: 12 TABLET | Refills: 2 | Status: SHIPPED | OUTPATIENT
Start: 2023-10-09 | End: 2023-10-09

## 2023-10-09 NOTE — TELEPHONE ENCOUNTER
Medication:   Requested Prescriptions     Pending Prescriptions Disp Refills    rizatriptan (MAXALT) 10 MG tablet 12 tablet 2     Sig: Take 1 tablet by mouth once as needed for Migraine May repeat in 2 hours if needed     Last Filled:  5.8.23    Last appt: 10/5/2023   Next appt: Visit date not found    Last OARRS:        No data to display

## 2023-10-10 ENCOUNTER — TELEPHONE (OUTPATIENT)
Dept: PRIMARY CARE CLINIC | Age: 43
End: 2023-10-10

## 2023-10-10 ENCOUNTER — TELEPHONE (OUTPATIENT)
Dept: ADMINISTRATIVE | Age: 43
End: 2023-10-10

## 2023-10-10 NOTE — TELEPHONE ENCOUNTER
The medication was DENIED; DENIAL letter uploaded to MEDIA. PLEASE READ THE ATTACHED LETTER. PATIENT IS APPROVED FOR A LESSER TOTAL AMOUNT OF THIS MEDICATION. PLEASE REVIEW AND MAKE A DECISION BASED ON THE INFORMATION GIVEN IN THE LETTER ATTACHED. If you want an APPEAL; please note in this encounter what new information you would like to APPEAL with. Once complete route back to PA POOL. If this requires a response please respond to the pool ( [phone] [phone]). Thank you please advise patient.

## 2023-10-10 NOTE — TELEPHONE ENCOUNTER
Pt called to get referral faxed again. Britney\"s fax number is [phone]. She needs CBC- with differentials , and BMP as well faxed. Please call pt for more info.

## 2023-10-10 NOTE — TELEPHONE ENCOUNTER
SUBMITTED PA FOR Rizatriptan Benzoate 10MG tablets VIA [facility] Key: [id number]  STATUS PENDING. FOLLOW UP DONE DAILY: IF NO RESPONSE IN 3 DAYS WE WILL REFAX FOR STATUS CHECK. IF ANOTHER 3 DAYS GOES BY WITH NO RESPONSE WILL CALL INSURANCE FOR STATUS.

## 2023-11-27 RX ORDER — ATOGEPANT 60 MG/1
60 TABLET ORAL DAILY
Qty: 30 TABLET | Refills: 5 | Status: SHIPPED | OUTPATIENT
Start: 2023-11-27

## 2023-11-27 NOTE — TELEPHONE ENCOUNTER
Medication:   Requested Prescriptions     Pending Prescriptions Disp Refills    Atogepant (QULIPTA) 60 MG TABS 30 tablet 5     Sig: Take 60 mg by mouth daily        Last Filled:      Patient Phone Number: [phone] (home)     Last appt: 10/5/2023   Next appt: Visit date not found    Last OARRS:        No data to display                Preferred Pharmacy:   [facility] [id number] [facility], [address] [facility] [phone] - F [phone]  [address] [place] [address]  Phone: [phone] Fax: [address] [place] [address] [phone] - f [facility] [address]  Phone: [phone] Fax: [phone]  Medication:   Requested Prescriptions     Pending Prescriptions Disp Refills    Atogepant (QULIPTA) 60 MG TABS 30 tablet 5     Sig: Take 60 mg by mouth daily        Last Filled:      Patient Phone Number: [phone] (home)     Last appt: 10/5/2023   Next appt: Visit date not found    Last OARRS:        No data to display                Preferred Pharmacy:   [facility] [id number] [facility], [address] [facility] [phone] - F [phone]  [address] [place] [address]  Phone: [phone] Fax: [phone] [address]  [address], [address] [place] [address] [phone] Kimberly Conti [phone]   [address] [place] [id number]  Phone: [phone] Fax: [phone]

## 2023-12-05 ENCOUNTER — OFFICE VISIT (OUTPATIENT)
Dept: PRIMARY CARE CLINIC | Age: 43
End: 2023-12-05
Payer: COMMERCIAL

## 2023-12-05 VITALS
SYSTOLIC BLOOD PRESSURE: 130 MMHG | BODY MASS INDEX: 30.65 KG/M2 | TEMPERATURE: 97.6 F | WEIGHT: 173 LBS | DIASTOLIC BLOOD PRESSURE: 82 MMHG | OXYGEN SATURATION: 99 % | RESPIRATION RATE: 12 BRPM | HEART RATE: 99 BPM

## 2023-12-05 DIAGNOSIS — M79.641 PAIN IN BOTH HANDS: ICD-10-CM

## 2023-12-05 DIAGNOSIS — M79.10 MYALGIA: ICD-10-CM

## 2023-12-05 DIAGNOSIS — M79.642 PAIN IN BOTH HANDS: ICD-10-CM

## 2023-12-05 DIAGNOSIS — H04.129 DRY EYE: ICD-10-CM

## 2023-12-05 DIAGNOSIS — M79.10 MYALGIA: Primary | ICD-10-CM

## 2023-12-05 DIAGNOSIS — R63.8 WEIGHT DISORDER: ICD-10-CM

## 2023-12-05 LAB
CRP SERPL-MCNC: 3.3 MG/L (ref 0–5.1)
DEPRECATED RDW RBC AUTO: 13 % (ref 12.4–15.4)
ERYTHROCYTE [SEDIMENTATION RATE] IN BLOOD BY WESTERGREN METHOD: 16 MM/HR (ref 0–20)
HCT VFR BLD AUTO: 39.3 % (ref 36–48)
HGB BLD-MCNC: 13.8 G/DL (ref 12–16)
MCH RBC QN AUTO: 29.1 PG (ref 26–34)
MCHC RBC AUTO-ENTMCNC: 35 G/DL (ref 31–36)
MCV RBC AUTO: 83 FL (ref 80–100)
PLATELET # BLD AUTO: 257 K/UL (ref 135–450)
PMV BLD AUTO: 7.4 FL (ref 5–10.5)
RBC # BLD AUTO: 4.73 M/UL (ref 4–5.2)
RHEUMATOID FACT SER IA-ACNC: <10 IU/ML
WBC # BLD AUTO: 9.4 K/UL (ref 4–11)

## 2023-12-05 PROCEDURE — 99214 OFFICE O/P EST MOD 30 MIN: CPT | Performed by: FAMILY MEDICINE

## 2023-12-05 RX ORDER — CELECOXIB 200 MG/1
200 CAPSULE ORAL DAILY
Qty: 30 CAPSULE | Refills: 1 | Status: SHIPPED | OUTPATIENT
Start: 2023-12-05

## 2023-12-05 NOTE — PROGRESS NOTES
SUBJECTIVE:  Patient ID: Reyes Morales is a 41 y.o. y.o. female     HPI   Pt is here with concern about both hands being swelling and stiff , worsen at night , pain is always worse with using   She doesn't work, there is no change in medication recently  She feels her eyes been dry  No repetitive movements she does  She has been having experience with biting the inside of her mouth when she picks on her nails, patient on anxiety medication may need to discuss this with her psychiatrist psychologist,  She gained significant weight she is status post ankle surgery less mobile advised the patient to pay attention to her diet  Past Medical History:   Diagnosis Date    ADHD (attention deficit hyperactivity disorder) 2023    Anxiety     Arthritis     Bipolar affective disorder ([address])     Borderline personality disorder ([address])     Depression     bipolar disorder    Headache     IBS (irritable bowel syndrome)     Neck pain     Post hysterectomy menopause syndrome       Past Surgical History:   Procedure Laterality Date    APPENDECTOMY  2007    CHOLECYSTECTOMY  2007    HYSTERECTOMY, VAGINAL      KNEE ARTHROSCOPY Right 2010    torn meniscus    KNEE SURGERY Right 2005    Knee cap    OVARY REMOVAL Right     PCOS    SINUS SURGERY  2000    YENNIFER AND BSO (CERVIX REMOVED)  2008    ovarian cyst, adenomyosis- benign    TUBAL LIGATION  2005    TYMPANOSTOMY TUBE PLACEMENT Bilateral     x 3      Family History   Problem Relation Age of Onset    Hypertension Mother     Mental Retardation Father         bipolar    Arthritis Father     Depression Father     Mental Illness Father         bipolar    Substance Abuse Father         alcoholic    Mental Retardation Sister         bipolar    Depression Sister     Mental Illness Sister         bipolar    Arthritis Maternal Grandmother     Arthritis Paternal Grandmother     Mental Illness Maternal Aunt         schizophrenia     Social History     Socioeconomic History    Marital status:

## 2023-12-06 LAB
ANA SER QL IA: NEGATIVE
ENA SCL70 IGG SER IA-ACNC: <0.2 AI (ref 0–0.9)

## 2024-01-26 DIAGNOSIS — M79.641 PAIN IN BOTH HANDS: ICD-10-CM

## 2024-01-26 DIAGNOSIS — M79.642 PAIN IN BOTH HANDS: ICD-10-CM

## 2024-01-26 DIAGNOSIS — M79.10 MYALGIA: ICD-10-CM

## 2024-01-26 NOTE — TELEPHONE ENCOUNTER
Medication:   Requested Prescriptions     Pending Prescriptions Disp Refills    celecoxib (CELEBREX) 200 MG capsule 90 capsule 0     Sig: Take 1 capsule by mouth daily     Last Filled:  12/05/23    Last appt: 12/5/2023   Next appt: Visit date not found    Last OARRS:        No data to display

## 2024-01-29 RX ORDER — CELECOXIB 200 MG/1
200 CAPSULE ORAL DAILY
Qty: 90 CAPSULE | Refills: 0 | Status: SHIPPED | OUTPATIENT
Start: 2024-01-29

## 2024-02-12 ENCOUNTER — OFFICE VISIT (OUTPATIENT)
Dept: PRIMARY CARE CLINIC | Age: 44
End: 2024-02-12
Payer: COMMERCIAL

## 2024-02-12 VITALS
HEIGHT: 63 IN | DIASTOLIC BLOOD PRESSURE: 88 MMHG | HEART RATE: 66 BPM | OXYGEN SATURATION: 99 % | BODY MASS INDEX: 29.52 KG/M2 | SYSTOLIC BLOOD PRESSURE: 124 MMHG | TEMPERATURE: 97.9 F | WEIGHT: 166.6 LBS

## 2024-02-12 DIAGNOSIS — J06.9 UPPER RESPIRATORY TRACT INFECTION, UNSPECIFIED TYPE: Primary | ICD-10-CM

## 2024-02-12 DIAGNOSIS — R05.1 ACUTE COUGH: ICD-10-CM

## 2024-02-12 DIAGNOSIS — R68.89 FLU-LIKE SYMPTOMS: ICD-10-CM

## 2024-02-12 DIAGNOSIS — Z11.52 ENCOUNTER FOR SCREENING FOR COVID-19: ICD-10-CM

## 2024-02-12 LAB
INFLUENZA A ANTIBODY: NORMAL
INFLUENZA B ANTIBODY: NORMAL
Lab: NORMAL
QC PASS/FAIL: NORMAL
S PYO AG THROAT QL: NORMAL
SARS-COV-2 RDRP RESP QL NAA+PROBE: NEGATIVE

## 2024-02-12 PROCEDURE — 87635 SARS-COV-2 COVID-19 AMP PRB: CPT | Performed by: FAMILY MEDICINE

## 2024-02-12 PROCEDURE — 87880 STREP A ASSAY W/OPTIC: CPT | Performed by: FAMILY MEDICINE

## 2024-02-12 PROCEDURE — 87804 INFLUENZA ASSAY W/OPTIC: CPT | Performed by: FAMILY MEDICINE

## 2024-02-12 PROCEDURE — 99213 OFFICE O/P EST LOW 20 MIN: CPT | Performed by: FAMILY MEDICINE

## 2024-02-12 RX ORDER — ALBUTEROL SULFATE 90 UG/1
2 AEROSOL, METERED RESPIRATORY (INHALATION) 4 TIMES DAILY
Qty: 18 G | Refills: 1 | Status: SHIPPED | OUTPATIENT
Start: 2024-02-12

## 2024-02-12 NOTE — PROGRESS NOTES
SUBJECTIVE:  Patient ID: Danette Clark is a 43 y.o. y.o. female     HPI   Upper Respiratory Infection: Patient complains of symptoms of a URI. Symptoms include congestion, coryza, cough, fever, and sore throat. Onset of symptoms was 2 days ago, unchanged since that time. She also c/o congestion, nasal congestion, post nasal drip, and sore throat for the past 3 days .  She is drinking moderate amounts of fluids. Evaluation to date: none. Treatment to date: oral decongestant.  Daughter was diagnosed with strep  Granddaughter has been sick    Past Medical History:   Diagnosis Date    ADHD (attention deficit hyperactivity disorder) 2023    Anxiety     Arthritis     Bipolar affective disorder ([facility])     Borderline personality disorder ([facility])     Depression     bipolar disorder    Headache     IBS (irritable bowel syndrome)     Neck pain     Post hysterectomy menopause syndrome       Past Surgical History:   Procedure Laterality Date    APPENDECTOMY  2007    CHOLECYSTECTOMY  2007    HYSTERECTOMY, VAGINAL      KNEE ARTHROSCOPY Right 2010    torn meniscus    KNEE SURGERY Right 2005    Knee cap    OVARY REMOVAL Right     PCOS    SINUS SURGERY  2000    YENNIFER AND BSO (CERVIX REMOVED)  2008    ovarian cyst, adenomyosis- benign    TUBAL LIGATION  2005    TYMPANOSTOMY TUBE PLACEMENT Bilateral     x 3      Family History   Problem Relation Age of Onset    Hypertension Mother     Mental Retardation Father         bipolar    Arthritis Father     Depression Father     Mental Illness Father         bipolar    Substance Abuse Father         alcoholic    Mental Retardation Sister         bipolar    Depression Sister     Mental Illness Sister         bipolar    Arthritis Maternal Grandmother     Arthritis Paternal Grandmother     Mental Illness Maternal Aunt         schizophrenia     Social History     Socioeconomic History    Marital status:      Spouse name: Igor    Number of children: 2    Years of education: None    Highest

## 2024-02-22 ENCOUNTER — PATIENT MESSAGE (OUTPATIENT)
Dept: PRIMARY CARE CLINIC | Age: 44
End: 2024-02-22

## 2024-02-22 NOTE — TELEPHONE ENCOUNTER
From: Danette Clark  To: Dr. Rojelio Lee  Sent: 2/22/2024 12:19 PM EST  Subject: Not feeling well     Hi. So I saw you for the joint pain. I am still having it as well as some other symptoms. I have been nauseated for days now. I also have been having headaches/migraines daily, loss of appetite, and I don’t know if this means anything but I had the severe tendonitis in my Achilles which was surgically corrected but now my posterior tibial tendon has been diagnosed with tendonitis that is very painful. I didn’t know if theses symptoms are concerning or not. Thank you

## 2024-02-27 ENCOUNTER — OFFICE VISIT (OUTPATIENT)
Dept: PRIMARY CARE CLINIC | Age: 44
End: 2024-02-27
Payer: COMMERCIAL

## 2024-02-27 VITALS
BODY MASS INDEX: 28.88 KG/M2 | DIASTOLIC BLOOD PRESSURE: 62 MMHG | SYSTOLIC BLOOD PRESSURE: 122 MMHG | HEIGHT: 63 IN | WEIGHT: 163 LBS | HEART RATE: 82 BPM | OXYGEN SATURATION: 97 %

## 2024-02-27 DIAGNOSIS — G43.819 OTHER MIGRAINE WITHOUT STATUS MIGRAINOSUS, INTRACTABLE: ICD-10-CM

## 2024-02-27 DIAGNOSIS — R10.84 GENERALIZED ABDOMINAL PAIN: ICD-10-CM

## 2024-02-27 DIAGNOSIS — R11.0 NAUSEA: Primary | ICD-10-CM

## 2024-02-27 DIAGNOSIS — R11.0 NAUSEA: ICD-10-CM

## 2024-02-27 DIAGNOSIS — F31.9 BIPOLAR 1 DISORDER (HCC): ICD-10-CM

## 2024-02-27 PROCEDURE — 99214 OFFICE O/P EST MOD 30 MIN: CPT | Performed by: FAMILY MEDICINE

## 2024-02-27 RX ORDER — ATOGEPANT 60 MG/1
60 TABLET ORAL DAILY
Qty: 30 TABLET | Refills: 5 | Status: SHIPPED | OUTPATIENT
Start: 2024-02-27

## 2024-02-27 RX ORDER — RIZATRIPTAN BENZOATE 10 MG/1
10 TABLET ORAL
Qty: 12 TABLET | Refills: 2 | Status: SHIPPED | OUTPATIENT
Start: 2024-02-27 | End: 2024-02-29

## 2024-02-27 RX ORDER — ONDANSETRON 4 MG/1
4 TABLET, FILM COATED ORAL 3 TIMES DAILY PRN
Qty: 21 TABLET | Refills: 0 | Status: SHIPPED | OUTPATIENT
Start: 2024-02-27

## 2024-02-27 RX ORDER — OMEPRAZOLE 40 MG/1
40 CAPSULE, DELAYED RELEASE ORAL 2 TIMES DAILY
Qty: 60 CAPSULE | Refills: 1 | Status: SHIPPED | OUTPATIENT
Start: 2024-02-27

## 2024-02-27 ASSESSMENT — ENCOUNTER SYMPTOMS
GASTROINTESTINAL NEGATIVE: 1
EYES NEGATIVE: 1
RESPIRATORY NEGATIVE: 1

## 2024-02-27 NOTE — PROGRESS NOTES
SUBJECTIVE:  Patient ID: Danette Clark is a 43 y.o. y.o. female     HPI   Pt is here with concern about constant nausea, it doesn't get affected by any food, has been eating less, belching and burp   Has not been able to eat occasional nausea I have a few bites  Patient admitted to taking NSAIDs for her headache specially because she ran out of Maxalt,  She has been experiencing on and off abdominal pain epigastric area, status post cholecystectomy.  Patient with bipolar 1 she sees psychiatrist on medication doing okay  Headache: Patient is here for follow up headache. She does not have a headache at this time.   Was seen by different doctor Since the last time she was seen here than she decided to switch back to our us  She has been having daily but she is not sure  Description of Headaches:  Location of pain: right-sided unilateral, left-sided unilateral, bilateral, frontal  Radiation of pain?:none  Character of pain:aching and sharp  Since she had the nausea she has not been able to eat and drink well her migraine almost daily even though she was doing very well on Qulipta which is continue to take daily 30-minute appointment to see neurologist    Past Medical History:   Diagnosis Date    ADHD (attention deficit hyperactivity disorder) 2023    Anxiety     Arthritis     Bipolar affective disorder (HCC)     Borderline personality disorder (HCC)     Depression     bipolar disorder    Headache     IBS (irritable bowel syndrome)     Neck pain     Post hysterectomy menopause syndrome       Past Surgical History:   Procedure Laterality Date    APPENDECTOMY  2007    CHOLECYSTECTOMY  2007    HYSTERECTOMY, VAGINAL      KNEE ARTHROSCOPY Right 2010    torn meniscus    KNEE SURGERY Right 2005    Knee cap    OVARY REMOVAL Right     PCOS    SINUS SURGERY  2000    YENNIFER AND BSO (CERVIX REMOVED)  2008    ovarian cyst, adenomyosis- benign    TUBAL LIGATION  2005    TYMPANOSTOMY TUBE PLACEMENT Bilateral     x 3      Family History

## 2024-02-28 ENCOUNTER — TELEPHONE (OUTPATIENT)
Dept: PRIMARY CARE CLINIC | Age: 44
End: 2024-02-28

## 2024-02-28 LAB
ALBUMIN SERPL-MCNC: 4.8 G/DL (ref 3.4–5)
ALP SERPL-CCNC: 131 U/L (ref 40–129)
ALT SERPL-CCNC: 15 U/L (ref 10–40)
AMYLASE SERPL-CCNC: 27 U/L (ref 25–115)
ANION GAP SERPL CALCULATED.3IONS-SCNC: 12 MMOL/L (ref 3–16)
AST SERPL-CCNC: 18 U/L (ref 15–37)
BILIRUB DIRECT SERPL-MCNC: <0.2 MG/DL (ref 0–0.3)
BILIRUB INDIRECT SERPL-MCNC: ABNORMAL MG/DL (ref 0–1)
BILIRUB SERPL-MCNC: 0.4 MG/DL (ref 0–1)
BUN SERPL-MCNC: 18 MG/DL (ref 7–20)
CALCIUM SERPL-MCNC: 9.5 MG/DL (ref 8.3–10.6)
CHLORIDE SERPL-SCNC: 103 MMOL/L (ref 99–110)
CO2 SERPL-SCNC: 25 MMOL/L (ref 21–32)
CREAT SERPL-MCNC: 0.9 MG/DL (ref 0.6–1.1)
DEPRECATED RDW RBC AUTO: 13 % (ref 12.4–15.4)
GFR SERPLBLD CREATININE-BSD FMLA CKD-EPI: >60 ML/MIN/{1.73_M2}
GLUCOSE SERPL-MCNC: 85 MG/DL (ref 70–99)
HCT VFR BLD AUTO: 38.6 % (ref 36–48)
HGB BLD-MCNC: 13.4 G/DL (ref 12–16)
LIPASE SERPL-CCNC: 23 U/L (ref 13–60)
MCH RBC QN AUTO: 28.8 PG (ref 26–34)
MCHC RBC AUTO-ENTMCNC: 34.7 G/DL (ref 31–36)
MCV RBC AUTO: 83.1 FL (ref 80–100)
PLATELET # BLD AUTO: 267 K/UL (ref 135–450)
PMV BLD AUTO: 7.5 FL (ref 5–10.5)
POTASSIUM SERPL-SCNC: 3.6 MMOL/L (ref 3.5–5.1)
PROT SERPL-MCNC: 7.1 G/DL (ref 6.4–8.2)
RBC # BLD AUTO: 4.64 M/UL (ref 4–5.2)
SODIUM SERPL-SCNC: 140 MMOL/L (ref 136–145)
WBC # BLD AUTO: 6.9 K/UL (ref 4–11)

## 2024-02-28 NOTE — TELEPHONE ENCOUNTER
Received fax from [facility].      Ondansetron 4mg is not covered by insurance.  Preferred alternative: Ondansetrontabmgodt    Rizatriptan 10mg not covered.  Preferred : Sumatriptantabmg, Eletriptantabmg, naratriptantabmg, Zolmitriptantabmg, Zolmitriptantabmgodt.

## 2024-02-29 ENCOUNTER — HOSPITAL ENCOUNTER (EMERGENCY)
Age: 44
Discharge: HOME | End: 2024-02-29
Attending: EMERGENCY MEDICINE
Payer: COMMERCIAL

## 2024-02-29 ENCOUNTER — APPOINTMENT (OUTPATIENT)
Age: 44
End: 2024-02-29
Payer: COMMERCIAL

## 2024-02-29 VITALS
HEIGHT: 63 IN | TEMPERATURE: 97.9 F | RESPIRATION RATE: 15 BRPM | BODY MASS INDEX: 28.81 KG/M2 | WEIGHT: 162.6 LBS | SYSTOLIC BLOOD PRESSURE: 122 MMHG | HEART RATE: 68 BPM | DIASTOLIC BLOOD PRESSURE: 86 MMHG | OXYGEN SATURATION: 96 %

## 2024-02-29 DIAGNOSIS — R51.9 SINUS HEADACHE: Primary | ICD-10-CM

## 2024-02-29 DIAGNOSIS — J01.20 ACUTE ETHMOIDAL SINUSITIS, RECURRENCE NOT SPECIFIED: ICD-10-CM

## 2024-02-29 DIAGNOSIS — M79.10 MYALGIA: Primary | ICD-10-CM

## 2024-02-29 LAB — TROPONIN I SERPL HS-MCNC: <6 NG/L (ref 0–14)

## 2024-02-29 PROCEDURE — 71045 X-RAY EXAM CHEST 1 VIEW: CPT

## 2024-02-29 PROCEDURE — 93005 ELECTROCARDIOGRAM TRACING: CPT

## 2024-02-29 PROCEDURE — 84484 ASSAY OF TROPONIN QUANT: CPT

## 2024-02-29 PROCEDURE — 96374 THER/PROPH/DIAG INJ IV PUSH: CPT

## 2024-02-29 PROCEDURE — 70450 CT HEAD/BRAIN W/O DYE: CPT

## 2024-02-29 PROCEDURE — 6360000002 HC RX W HCPCS: Performed by: EMERGENCY MEDICINE

## 2024-02-29 PROCEDURE — 96375 TX/PRO/DX INJ NEW DRUG ADDON: CPT

## 2024-02-29 PROCEDURE — 99285 EMERGENCY DEPT VISIT HI MDM: CPT

## 2024-02-29 PROCEDURE — 2580000003 HC RX 258: Performed by: EMERGENCY MEDICINE

## 2024-02-29 RX ORDER — CALCIUM CARBONATE 300MG(750)
400 TABLET,CHEWABLE ORAL DAILY
COMMUNITY

## 2024-02-29 RX ORDER — PROCHLORPERAZINE EDISYLATE 5 MG/ML
10 INJECTION INTRAMUSCULAR; INTRAVENOUS ONCE
Status: COMPLETED | OUTPATIENT
Start: 2024-02-29 | End: 2024-02-29

## 2024-02-29 RX ORDER — 0.9 % SODIUM CHLORIDE 0.9 %
1000 INTRAVENOUS SOLUTION INTRAVENOUS ONCE
Status: COMPLETED | OUTPATIENT
Start: 2024-02-29 | End: 2024-02-29

## 2024-02-29 RX ORDER — DIPHENHYDRAMINE HYDROCHLORIDE 50 MG/ML
25 INJECTION INTRAMUSCULAR; INTRAVENOUS ONCE
Status: COMPLETED | OUTPATIENT
Start: 2024-02-29 | End: 2024-02-29

## 2024-02-29 RX ORDER — LISDEXAMFETAMINE DIMESYLATE CAPSULES 30 MG/1
30 CAPSULE ORAL EVERY MORNING
COMMUNITY
Start: 2024-02-03

## 2024-02-29 RX ORDER — KETOROLAC TROMETHAMINE 15 MG/ML
15 INJECTION, SOLUTION INTRAMUSCULAR; INTRAVENOUS ONCE
Status: COMPLETED | OUTPATIENT
Start: 2024-02-29 | End: 2024-02-29

## 2024-02-29 RX ORDER — AMOXICILLIN AND CLAVULANATE POTASSIUM 875; 125 MG/1; MG/1
1 TABLET, FILM COATED ORAL 2 TIMES DAILY
Qty: 20 TABLET | Refills: 0 | Status: SHIPPED | OUTPATIENT
Start: 2024-02-29 | End: 2024-03-10

## 2024-02-29 RX ADMIN — SODIUM CHLORIDE 1000 ML: 9 INJECTION, SOLUTION INTRAVENOUS at 13:52

## 2024-02-29 RX ADMIN — PROCHLORPERAZINE EDISYLATE 10 MG: 5 INJECTION INTRAMUSCULAR; INTRAVENOUS at 13:54

## 2024-02-29 RX ADMIN — KETOROLAC TROMETHAMINE 15 MG: 15 INJECTION, SOLUTION INTRAMUSCULAR; INTRAVENOUS at 13:54

## 2024-02-29 RX ADMIN — DIPHENHYDRAMINE HYDROCHLORIDE 25 MG: 50 INJECTION, SOLUTION INTRAMUSCULAR; INTRAVENOUS at 13:54

## 2024-02-29 ASSESSMENT — PAIN DESCRIPTION - PAIN TYPE
TYPE: ACUTE PAIN
TYPE: ACUTE PAIN

## 2024-02-29 ASSESSMENT — PAIN - FUNCTIONAL ASSESSMENT
PAIN_FUNCTIONAL_ASSESSMENT: ACTIVITIES ARE NOT PREVENTED
PAIN_FUNCTIONAL_ASSESSMENT: 0-10
PAIN_FUNCTIONAL_ASSESSMENT: ACTIVITIES ARE NOT PREVENTED

## 2024-02-29 ASSESSMENT — PAIN DESCRIPTION - DIRECTION
RADIATING_TOWARDS: BEHIND THE EYES
RADIATING_TOWARDS: BEHIND THE EYES

## 2024-02-29 ASSESSMENT — PAIN SCALES - GENERAL
PAINLEVEL_OUTOF10: 5
PAINLEVEL_OUTOF10: 8
PAINLEVEL_OUTOF10: 7

## 2024-02-29 ASSESSMENT — PAIN DESCRIPTION - DESCRIPTORS
DESCRIPTORS: POUNDING;PRESSURE
DESCRIPTORS: POUNDING;PRESSURE

## 2024-02-29 ASSESSMENT — PAIN DESCRIPTION - LOCATION
LOCATION: HEAD
LOCATION: HEAD

## 2024-02-29 ASSESSMENT — PAIN DESCRIPTION - ORIENTATION
ORIENTATION: RIGHT
ORIENTATION: RIGHT

## 2024-02-29 ASSESSMENT — LIFESTYLE VARIABLES
HOW OFTEN DO YOU HAVE A DRINK CONTAINING ALCOHOL: NEVER
HOW MANY STANDARD DRINKS CONTAINING ALCOHOL DO YOU HAVE ON A TYPICAL DAY: PATIENT DOES NOT DRINK

## 2024-02-29 ASSESSMENT — PAIN DESCRIPTION - FREQUENCY
FREQUENCY: CONTINUOUS
FREQUENCY: CONTINUOUS

## 2024-02-29 ASSESSMENT — PAIN DESCRIPTION - ONSET
ONSET: ON-GOING
ONSET: ON-GOING

## 2024-02-29 NOTE — ED NOTES
Writer at bedside to d/c pt and go over AVS as well as discharge instructions. Pt informed writer of the urgent need to go to the restroom prior to completing d/c education. Pt able to ambulate independently with stable gait to restroom at this time. Pt denies dizziness or lightheadedness upon standing and throughout ambulating. Pt to be educated on d/c intructions and AVS after returning from restroom.

## 2024-02-29 NOTE — ED NOTES
Pt provided with warm blankets and room lights off to provide comfort with headache. Pt denies any further needs at this time. Plan of care ongoing.

## 2024-02-29 NOTE — ED NOTES
Pt able to ambulate independently with stable gait while ambulating to ER room. Pt denies dizziness or lightheadedness upon standing and throughout ambulating.

## 2024-02-29 NOTE — ED NOTES
Pt back from CT and remains on the monitor at this time. Pt VSS. Pt provided with additional warm blankets. Pt call light within reach. Pt denies further needs at this time. Plan of care ongoing.

## 2024-02-29 NOTE — ED NOTES
MD at bedside to discuss findings with patient. Writer informed MD about patient pain being better but still a 5/10 and above the patients stated pain goal. MD aware and no new orders for pain medication were received.

## 2024-02-29 NOTE — ED PROVIDER NOTES
the dictations but occasionally words are mis-transcribed.)       Primo Ramírez MD (electronically signed)              Primo Ramírez MD  02/29/24 3031

## 2024-02-29 NOTE — ED NOTES
Pt educated on d/c instructions and new medications prescribed. Pt VSS throughout ER visit and at d/c. Pt denies any questions or concerns regarding education provided. Pt able to ambulate to exit with steady and stable gait.

## 2024-02-29 NOTE — ED NOTES
Pt medicated per MD order. Pt educated on medications prior to administration. Pt denies any questions or concerns at this time. Pt educated on plan of care. Pt call light within reach. Pt denies further needs at this time.

## 2024-02-29 NOTE — DISCHARGE INSTRUCTIONS
Please continue taking medications prescribed by your primary care physician and contact their office for a follow-up appointment in the next 1 to 3 days.  Also please be sure to take your full 10-day course of Augmentin that I prescribed you for your acute ethmoid sinusitis.

## 2024-03-01 LAB
EKG ATRIAL RATE: 71 BPM
EKG DIAGNOSIS: NORMAL
EKG P AXIS: 60 DEGREES
EKG P-R INTERVAL: 170 MS
EKG Q-T INTERVAL: 412 MS
EKG QRS DURATION: 94 MS
EKG QTC CALCULATION (BAZETT): 447 MS
EKG R AXIS: 58 DEGREES
EKG T AXIS: 75 DEGREES
EKG VENTRICULAR RATE: 71 BPM

## 2024-03-04 ENCOUNTER — OFFICE VISIT (OUTPATIENT)
Dept: PRIMARY CARE CLINIC | Age: 44
End: 2024-03-04
Payer: COMMERCIAL

## 2024-03-04 VITALS
BODY MASS INDEX: 29.02 KG/M2 | HEART RATE: 98 BPM | DIASTOLIC BLOOD PRESSURE: 88 MMHG | WEIGHT: 163.8 LBS | SYSTOLIC BLOOD PRESSURE: 130 MMHG | HEIGHT: 63 IN | OXYGEN SATURATION: 97 %

## 2024-03-04 DIAGNOSIS — K21.9 GASTROESOPHAGEAL REFLUX DISEASE, UNSPECIFIED WHETHER ESOPHAGITIS PRESENT: ICD-10-CM

## 2024-03-04 DIAGNOSIS — R11.0 NAUSEA: Primary | ICD-10-CM

## 2024-03-04 DIAGNOSIS — J01.90 ACUTE NON-RECURRENT SINUSITIS, UNSPECIFIED LOCATION: ICD-10-CM

## 2024-03-04 DIAGNOSIS — G43.819 OTHER MIGRAINE WITHOUT STATUS MIGRAINOSUS, INTRACTABLE: ICD-10-CM

## 2024-03-04 LAB
BILIRUBIN, POC: NORMAL
BLOOD URINE, POC: NORMAL
CLARITY, POC: CLEAR
COLOR, POC: YELLOW
GLUCOSE URINE, POC: NORMAL
KETONES, POC: NORMAL
LEUKOCYTE EST, POC: NORMAL
NITRITE, POC: NORMAL
PH, POC: 7
PROTEIN, POC: NORMAL
SPECIFIC GRAVITY, POC: 1.02
UROBILINOGEN, POC: 0.2

## 2024-03-04 PROCEDURE — 99214 OFFICE O/P EST MOD 30 MIN: CPT | Performed by: FAMILY MEDICINE

## 2024-03-04 PROCEDURE — 81002 URINALYSIS NONAUTO W/O SCOPE: CPT | Performed by: FAMILY MEDICINE

## 2024-03-04 RX ORDER — PANTOPRAZOLE SODIUM 40 MG/1
40 TABLET, DELAYED RELEASE ORAL
Qty: 60 TABLET | Refills: 1 | Status: SHIPPED | OUTPATIENT
Start: 2024-03-04

## 2024-03-04 ASSESSMENT — ENCOUNTER SYMPTOMS
EYES NEGATIVE: 1
NAUSEA: 1
RESPIRATORY NEGATIVE: 1

## 2024-03-04 NOTE — PROGRESS NOTES
SUBJECTIVE:  Patient ID: Danette Clark is a 43 y.o. y.o. female     HPI   Patient presented today for follow-up and recent emergency room visit because she had intractable nausea and vomiting did not get better  They hydrate her well IV fluid was given medication to CT scan of the head shows possible sinus infection started on antibiotic  The nausea is not that much better she is using Prilosec does not feel it is helping her much Zofran did help a little bit she was given Compazine from the hospital  Very little improvement according to the patient  She slept after she came from the hospital for many hours because of the medication she was given  Past Medical History:   Diagnosis Date    ADHD (attention deficit hyperactivity disorder) 2023    Anxiety     Arthritis     Bipolar affective disorder (HCC)     Borderline personality disorder (HCC)     Depression     bipolar disorder    Headache     IBS (irritable bowel syndrome)     Neck pain     Post hysterectomy menopause syndrome       Past Surgical History:   Procedure Laterality Date    ACHILLES TENDON SURGERY Right 11/01/2023    APPENDECTOMY  2007    CHOLECYSTECTOMY  2007    HYSTERECTOMY, VAGINAL      KNEE ARTHROSCOPY Right 2010    torn meniscus    KNEE SURGERY Right 2005    Knee cap    OVARY REMOVAL Right     PCOS    SINUS SURGERY  2000    YENNIFER AND BSO (CERVIX REMOVED)  2008    ovarian cyst, adenomyosis- benign    TUBAL LIGATION  2005    TYMPANOSTOMY TUBE PLACEMENT Bilateral     x 3      Family History   Problem Relation Age of Onset    Hypertension Mother     Mental Retardation Father         bipolar    Arthritis Father     Depression Father     Mental Illness Father         bipolar    Substance Abuse Father         alcoholic    Mental Retardation Sister         bipolar    Depression Sister     Mental Illness Sister         bipolar    Arthritis Maternal Grandmother     Arthritis Paternal Grandmother     Mental Illness Maternal Aunt         schizophrenia

## 2024-03-05 RX ORDER — PANTOPRAZOLE SODIUM 40 MG/1
40 TABLET, DELAYED RELEASE ORAL
Qty: 60 TABLET | Refills: 1 | OUTPATIENT
Start: 2024-03-05

## 2024-03-05 NOTE — TELEPHONE ENCOUNTER
Medication:   Requested Prescriptions     Pending Prescriptions Disp Refills    pantoprazole (PROTONIX) 40 MG tablet 60 tablet 1     Sig: Take 1 tablet by mouth 2 times daily (before meals)     Last Filled:  Pharmacy states patient insurance requesting a 90 day supply instead   Thy need a new script.       Last appt: 3/4/2024   Next appt: 4/2/2024    Last OARRS:        No data to display

## 2024-03-21 RX ORDER — PANTOPRAZOLE SODIUM 40 MG/1
40 TABLET, DELAYED RELEASE ORAL
Qty: 90 TABLET | Refills: 1 | Status: SHIPPED | OUTPATIENT
Start: 2024-03-21

## 2024-03-28 ENCOUNTER — HOSPITAL ENCOUNTER (EMERGENCY)
Age: 44
Discharge: HOME | End: 2024-03-28
Attending: EMERGENCY MEDICINE
Payer: COMMERCIAL

## 2024-03-28 ENCOUNTER — APPOINTMENT (OUTPATIENT)
Age: 44
End: 2024-03-28
Payer: COMMERCIAL

## 2024-03-28 VITALS
RESPIRATION RATE: 18 BRPM | HEIGHT: 63 IN | OXYGEN SATURATION: 100 % | SYSTOLIC BLOOD PRESSURE: 124 MMHG | BODY MASS INDEX: 28.35 KG/M2 | WEIGHT: 160 LBS | TEMPERATURE: 98.5 F | DIASTOLIC BLOOD PRESSURE: 85 MMHG | HEART RATE: 69 BPM

## 2024-03-28 DIAGNOSIS — R10.32 LEFT LOWER QUADRANT ABDOMINAL PAIN: ICD-10-CM

## 2024-03-28 DIAGNOSIS — R11.0 NAUSEA: Primary | ICD-10-CM

## 2024-03-28 LAB
ANION GAP SERPL CALCULATED.3IONS-SCNC: 15 MMOL/L (ref 3–16)
BASOPHILS # BLD: 0.03 K/UL (ref 0–0.2)
BASOPHILS NFR BLD: 1 %
BILIRUB UR QL STRIP: NEGATIVE
BUN SERPL-MCNC: 15 MG/DL (ref 7–20)
CALCIUM SERPL-MCNC: 10.2 MG/DL (ref 8.3–10.6)
CHLORIDE SERPL-SCNC: 102 MMOL/L (ref 99–110)
CLARITY UR: CLEAR
CO2 SERPL-SCNC: 24 MMOL/L (ref 21–32)
COLOR UR: YELLOW
COMMENT: NORMAL
CREAT SERPL-MCNC: 0.9 MG/DL (ref 0.5–1)
EOSINOPHIL # BLD: 0.09 K/UL (ref 0–0.6)
EOSINOPHILS RELATIVE PERCENT: 1 %
ERYTHROCYTE [DISTWIDTH] IN BLOOD BY AUTOMATED COUNT: 12.2 % (ref 12.4–15.4)
GFR SERPL CREATININE-BSD FRML MDRD: 86 ML/MIN/1.73M2
GLUCOSE SERPL-MCNC: 91 MG/DL (ref 70–99)
GLUCOSE UR STRIP-MCNC: NEGATIVE MG/DL
HCG UR QL: NEGATIVE
HCT VFR BLD AUTO: 42.5 % (ref 36–48)
HGB BLD-MCNC: 14.5 G/DL (ref 12–16)
HGB UR QL STRIP.AUTO: NEGATIVE
IMM GRANULOCYTES # BLD AUTO: 0.01 K/UL (ref 0–0.5)
IMM GRANULOCYTES NFR BLD: 0 %
KETONES UR STRIP-MCNC: NEGATIVE MG/DL
LEUKOCYTE ESTERASE UR QL STRIP: NEGATIVE
LIPASE SERPL-CCNC: 23 U/L (ref 13–60)
LYMPHOCYTES NFR BLD: 1.87 K/UL (ref 1–5.1)
LYMPHOCYTES RELATIVE PERCENT: 29 %
MAGNESIUM SERPL-MCNC: 2.4 MG/DL (ref 1.8–2.4)
MCH RBC QN AUTO: 28.3 PG (ref 26–34)
MCHC RBC AUTO-ENTMCNC: 34.1 G/DL (ref 31–36)
MCV RBC AUTO: 83 FL (ref 80–100)
MONOCYTES NFR BLD: 0.47 K/UL (ref 0–1.3)
MONOCYTES NFR BLD: 7 %
NEUTROPHILS NFR BLD: 62 %
NEUTS SEG NFR BLD: 4.03 K/UL (ref 1.7–7.7)
NITRITE UR QL STRIP: NEGATIVE
PH UR STRIP: 8 [PH] (ref 5–8)
PLATELET # BLD AUTO: 285 K/UL (ref 135–450)
PMV BLD AUTO: 8.9 FL (ref 9.4–12.4)
POTASSIUM SERPL-SCNC: 3.5 MMOL/L (ref 3.5–5.1)
PROT UR STRIP-MCNC: NEGATIVE MG/DL
RBC # BLD AUTO: 5.12 M/UL (ref 4–5.2)
SODIUM SERPL-SCNC: 141 MMOL/L (ref 136–145)
SP GR UR STRIP: 1.01 (ref 1–1.03)
UROBILINOGEN UR STRIP-ACNC: 0.2 EU/DL (ref 0–1)
WBC OTHER # BLD: 6.5 K/UL (ref 4–11)

## 2024-03-28 PROCEDURE — 36415 COLL VENOUS BLD VENIPUNCTURE: CPT

## 2024-03-28 PROCEDURE — 6360000002 HC RX W HCPCS: Performed by: EMERGENCY MEDICINE

## 2024-03-28 PROCEDURE — 85025 COMPLETE CBC W/AUTO DIFF WBC: CPT

## 2024-03-28 PROCEDURE — 6360000004 HC RX CONTRAST MEDICATION: Performed by: EMERGENCY MEDICINE

## 2024-03-28 PROCEDURE — 99285 EMERGENCY DEPT VISIT HI MDM: CPT

## 2024-03-28 PROCEDURE — 80048 BASIC METABOLIC PNL TOTAL CA: CPT

## 2024-03-28 PROCEDURE — 96375 TX/PRO/DX INJ NEW DRUG ADDON: CPT

## 2024-03-28 PROCEDURE — 81003 URINALYSIS AUTO W/O SCOPE: CPT

## 2024-03-28 PROCEDURE — 96376 TX/PRO/DX INJ SAME DRUG ADON: CPT

## 2024-03-28 PROCEDURE — 83735 ASSAY OF MAGNESIUM: CPT

## 2024-03-28 PROCEDURE — 84703 CHORIONIC GONADOTROPIN ASSAY: CPT

## 2024-03-28 PROCEDURE — 74177 CT ABD & PELVIS W/CONTRAST: CPT

## 2024-03-28 PROCEDURE — 83690 ASSAY OF LIPASE: CPT

## 2024-03-28 PROCEDURE — 96374 THER/PROPH/DIAG INJ IV PUSH: CPT

## 2024-03-28 PROCEDURE — 2580000003 HC RX 258: Performed by: EMERGENCY MEDICINE

## 2024-03-28 RX ORDER — 0.9 % SODIUM CHLORIDE 0.9 %
1000 INTRAVENOUS SOLUTION INTRAVENOUS ONCE
Status: COMPLETED | OUTPATIENT
Start: 2024-03-28 | End: 2024-03-28

## 2024-03-28 RX ORDER — MORPHINE SULFATE 4 MG/ML
4 INJECTION, SOLUTION INTRAMUSCULAR; INTRAVENOUS ONCE
Status: COMPLETED | OUTPATIENT
Start: 2024-03-28 | End: 2024-03-28

## 2024-03-28 RX ORDER — PROMETHAZINE HYDROCHLORIDE 25 MG/1
25 TABLET ORAL 4 TIMES DAILY PRN
Qty: 20 TABLET | Refills: 0 | Status: SHIPPED | OUTPATIENT
Start: 2024-03-28 | End: 2024-04-04

## 2024-03-28 RX ORDER — DICYCLOMINE HCL 20 MG
20 TABLET ORAL 4 TIMES DAILY
Qty: 20 TABLET | Refills: 0 | Status: SHIPPED | OUTPATIENT
Start: 2024-03-28 | End: 2024-04-02

## 2024-03-28 RX ORDER — ONDANSETRON 2 MG/ML
4 INJECTION INTRAMUSCULAR; INTRAVENOUS ONCE
Status: COMPLETED | OUTPATIENT
Start: 2024-03-28 | End: 2024-03-28

## 2024-03-28 RX ADMIN — ONDANSETRON 4 MG: 2 INJECTION INTRAMUSCULAR; INTRAVENOUS at 17:25

## 2024-03-28 RX ADMIN — MORPHINE SULFATE 4 MG: 4 INJECTION, SOLUTION INTRAMUSCULAR; INTRAVENOUS at 17:25

## 2024-03-28 RX ADMIN — SODIUM CHLORIDE 1000 ML: 9 INJECTION, SOLUTION INTRAVENOUS at 17:27

## 2024-03-28 RX ADMIN — IOPAMIDOL 75 ML: 755 INJECTION, SOLUTION INTRAVENOUS at 17:19

## 2024-03-28 RX ADMIN — MORPHINE SULFATE 4 MG: 4 INJECTION, SOLUTION INTRAMUSCULAR; INTRAVENOUS at 18:38

## 2024-03-28 ASSESSMENT — PAIN DESCRIPTION - LOCATION
LOCATION: ABDOMEN;FLANK
LOCATION: ABDOMEN

## 2024-03-28 ASSESSMENT — PAIN - FUNCTIONAL ASSESSMENT
PAIN_FUNCTIONAL_ASSESSMENT: 0-10
PAIN_FUNCTIONAL_ASSESSMENT: ACTIVITIES ARE NOT PREVENTED
PAIN_FUNCTIONAL_ASSESSMENT: ACTIVITIES ARE NOT PREVENTED

## 2024-03-28 ASSESSMENT — PAIN SCALES - GENERAL
PAINLEVEL_OUTOF10: 5
PAINLEVEL_OUTOF10: 8
PAINLEVEL_OUTOF10: 8
PAINLEVEL_OUTOF10: 3
PAINLEVEL_OUTOF10: 5
PAINLEVEL_OUTOF10: 6

## 2024-03-28 ASSESSMENT — PAIN DESCRIPTION - PAIN TYPE
TYPE: ACUTE PAIN

## 2024-03-28 ASSESSMENT — PAIN DESCRIPTION - DESCRIPTORS
DESCRIPTORS: STABBING
DESCRIPTORS: SHARP;ACHING
DESCRIPTORS: DULL;ACHING
DESCRIPTORS: STABBING;ACHING

## 2024-03-28 ASSESSMENT — PAIN DESCRIPTION - ORIENTATION
ORIENTATION: LEFT;UPPER;LOWER
ORIENTATION: LEFT;LOWER;UPPER
ORIENTATION: LOWER;LEFT;UPPER
ORIENTATION: LEFT;UPPER;LOWER
ORIENTATION: LEFT;LOWER;UPPER

## 2024-03-28 ASSESSMENT — ENCOUNTER SYMPTOMS
ABDOMINAL PAIN: 1
NAUSEA: 1
RESPIRATORY NEGATIVE: 1

## 2024-03-28 ASSESSMENT — PAIN DESCRIPTION - ONSET: ONSET: ON-GOING

## 2024-03-28 ASSESSMENT — PAIN DESCRIPTION - FREQUENCY
FREQUENCY: CONTINUOUS
FREQUENCY: CONTINUOUS
FREQUENCY: OTHER (COMMENT)
FREQUENCY: CONTINUOUS

## 2024-03-28 NOTE — ED TRIAGE NOTES
Patient arrives to ED triage ambulatory with c/o LUQ, LLQ and Left flank pain. Patient denies radiating pain. Patient describes pain as constant ache with intermittent sharp pains. Patient denies urinary s/s. Pt reports gastritis dx about a week ago, after having endoscopy done for weightloss, nausea, decreased appetite. Endoscopy results dx inactive chronic gastritis per patient. Patient came in today for worsening s/s. Overall, pt has been having these s/s for about 6 weeks.

## 2024-03-28 NOTE — ED PROVIDER NOTES
[facility] EMERGENCY DEPT     EMERGENCY DEPARTMENT ENCOUNTER            Pt Name: Danette Clark   MRN: [id number]   Birthdate 1980   Date of evaluation: 3/28/2024   Provider: Primo Ramírez MD   PCP: Rojelio Lee MD   Note Started: 5:03 PM EDT 3/28/24          CHIEF COMPLAINT     Chief Complaint   Patient presents with    Abdominal Pain    Nausea             HISTORY OF PRESENT ILLNESS:   History from : Patient   Limitations to history : None     Danette Clark is a 43 y.o. female who presents ambulatory to the emergency department with left lower and left upper quadrant abdominal pain as well as left flank pain described as constant aching with intermittent 10 out of 10 sharp pain.  She reports no hematuria or dysuria.  Denies fever or chills.  She stated that approximately 1 week ago her physician gave her the diagnosis of gastritis status post endoscopy.  She complains of constant nausea, occasional nausea and anorexia.  She advises that her endoscopy results showed chronic gastritis.  Her symptoms have been present x 6 weeks.  She has been using over-the-counter Tylenol and was told not to take ibuprofen.  The patient does report a significant past surgical history including cholecystectomy, appendectomy, hysterectomy, oophorectomy.    Nursing Notes were all reviewed and agreed with, or any disagreements were addressed in the HPI.     Review of Systems   Constitutional:  Negative for chills, diaphoresis and fever.   HENT: Negative.     Respiratory: Negative.     Cardiovascular: Negative.    Gastrointestinal:  Positive for abdominal pain and nausea.   Genitourinary: Negative.    Musculoskeletal: Negative.    Neurological:  Positive for light-headedness and headaches.   Hematological: Negative.          MEDICAL HISTORY   has a past medical history of ADHD (attention deficit hyperactivity disorder) (2023), Anxiety, Arthritis, Bipolar affective disorder (HCC), Borderline personality disorder

## 2024-04-01 ENCOUNTER — OFFICE VISIT (OUTPATIENT)
Dept: PRIMARY CARE CLINIC | Age: 44
End: 2024-04-01
Payer: COMMERCIAL

## 2024-04-01 VITALS
DIASTOLIC BLOOD PRESSURE: 80 MMHG | HEIGHT: 63 IN | OXYGEN SATURATION: 99 % | WEIGHT: 155.4 LBS | BODY MASS INDEX: 27.54 KG/M2 | SYSTOLIC BLOOD PRESSURE: 120 MMHG | HEART RATE: 74 BPM | TEMPERATURE: 97.4 F

## 2024-04-01 DIAGNOSIS — K21.9 GASTROESOPHAGEAL REFLUX DISEASE, UNSPECIFIED WHETHER ESOPHAGITIS PRESENT: ICD-10-CM

## 2024-04-01 DIAGNOSIS — R11.0 NAUSEA: ICD-10-CM

## 2024-04-01 DIAGNOSIS — R10.9 ABDOMINAL PAIN, UNSPECIFIED ABDOMINAL LOCATION: Primary | ICD-10-CM

## 2024-04-01 LAB
BILIRUBIN, POC: NEGATIVE
BLOOD URINE, POC: NORMAL
CLARITY, POC: CLEAR
COLOR, POC: YELLOW
GLUCOSE URINE, POC: NEGATIVE
KETONES, POC: NEGATIVE
LEUKOCYTE EST, POC: NEGATIVE
NITRITE, POC: NEGATIVE
PH, POC: 7
PROTEIN, POC: NEGATIVE
SPECIFIC GRAVITY, POC: 1.01
UROBILINOGEN, POC: NORMAL

## 2024-04-01 PROCEDURE — 99214 OFFICE O/P EST MOD 30 MIN: CPT | Performed by: FAMILY MEDICINE

## 2024-04-01 PROCEDURE — 81002 URINALYSIS NONAUTO W/O SCOPE: CPT | Performed by: FAMILY MEDICINE

## 2024-04-01 RX ORDER — DICYCLOMINE HCL 20 MG
20 TABLET ORAL 4 TIMES DAILY
Qty: 40 TABLET | Refills: 1 | Status: SHIPPED | OUTPATIENT
Start: 2024-04-01 | End: 2024-04-06

## 2024-04-01 RX ORDER — DEXTROAMPHETAMINE SACCHARATE, AMPHETAMINE ASPARTATE MONOHYDRATE, DEXTROAMPHETAMINE SULFATE AND AMPHETAMINE SULFATE 3.75; 3.75; 3.75; 3.75 MG/1; MG/1; MG/1; MG/1
CAPSULE, EXTENDED RELEASE ORAL DAILY
COMMUNITY
Start: 2024-03-18

## 2024-04-01 RX ORDER — ALBUTEROL SULFATE 90 UG/1
AEROSOL, METERED RESPIRATORY (INHALATION)
Qty: 18 G | Refills: 1 | Status: SHIPPED | OUTPATIENT
Start: 2024-04-01

## 2024-04-01 ASSESSMENT — ENCOUNTER SYMPTOMS
VOMITING: 1
ABDOMINAL PAIN: 1
ANAL BLEEDING: 0
CONSTIPATION: 1
BLOOD IN STOOL: 0
NAUSEA: 1
EYES NEGATIVE: 1
RECTAL PAIN: 0
RESPIRATORY NEGATIVE: 1
DIARRHEA: 0

## 2024-04-01 NOTE — TELEPHONE ENCOUNTER
Medication:   Requested Prescriptions     Pending Prescriptions Disp Refills    albuterol sulfate HFA (PROVENTIL;VENTOLIN;PROAIR) 108 (90 Base) MCG/ACT inhaler [Pharmacy Med Name: ALBUTEROL HFA INH(200 PUFFS) 18GM] 18 g 1     Sig: INHALE 2 PUFFS INTO THE LUNGS FOUR TIMES DAILY     Last Filled:  3.5.24    Last appt: 3/4/2024   Next appt: 4/1/2024    Last OARRS:        No data to display

## 2024-04-01 NOTE — PROGRESS NOTES
scleral icterus.     Conjunctiva/sclera: Conjunctivae normal.   Neck:      Thyroid: No thyromegaly.      Vascular: No JVD.   Cardiovascular:      Rate and Rhythm: Normal rate and regular rhythm.      Heart sounds: Normal heart sounds. No murmur heard.     No friction rub. No gallop.   Pulmonary:      Effort: Pulmonary effort is normal.      Breath sounds: Normal breath sounds. No wheezing or rales.   Abdominal:      General: Bowel sounds are normal. There is no distension.      Palpations: Abdomen is soft. There is no mass.      Tenderness: There is abdominal tenderness in the left upper quadrant. There is no right CVA tenderness, left CVA tenderness, guarding or rebound.      Hernia: No hernia is present.   Lymphadenopathy:      Cervical: No cervical adenopathy.   Skin:     Findings: No rash.   Neurological:      Mental Status: She is alert and oriented to person, place, and time.         ASSESSMENT:     Diagnosis Orders   1. Abdominal pain, unspecified abdominal location  POCT Urinalysis no Micro    Culture, Urine      2. Nausea        3. Gastroesophageal reflux disease, unspecified whether esophagitis present            PLAN:    See orders  Reviewed ER records CT scan blood work  Keep a diary of symptoms  Information about IBS diet FODMAP diet given  Bentyl can use as needed prescription is given  Small portion more frequent  May need to follow-up with GI  She already had endoscopy colonoscopy

## 2024-04-02 LAB — BACTERIA UR CULT: NORMAL

## 2024-04-05 RX ORDER — PROMETHAZINE HYDROCHLORIDE 25 MG/1
25 TABLET ORAL 4 TIMES DAILY PRN
Qty: 20 TABLET | Refills: 0 | Status: SHIPPED | OUTPATIENT
Start: 2024-04-05 | End: 2024-04-12

## 2024-04-05 NOTE — TELEPHONE ENCOUNTER
Medication:   Requested Prescriptions     Pending Prescriptions Disp Refills    promethazine (PHENERGAN) 25 MG tablet 20 tablet 0     Sig: Take 1 tablet by mouth 4 times daily as needed for Nausea     Last Filled:  3/28/2024    Last appt: 4/1/2024   Next appt: Visit date not found    Last OARRS:        No data to display

## 2024-04-13 ENCOUNTER — HOSPITAL ENCOUNTER (EMERGENCY)
Age: 44
Discharge: HOME | End: 2024-04-13
Attending: EMERGENCY MEDICINE
Payer: COMMERCIAL

## 2024-04-13 VITALS
BODY MASS INDEX: 27.29 KG/M2 | DIASTOLIC BLOOD PRESSURE: 72 MMHG | HEART RATE: 72 BPM | OXYGEN SATURATION: 98 % | WEIGHT: 154 LBS | RESPIRATION RATE: 21 BRPM | HEIGHT: 63 IN | TEMPERATURE: 98.2 F | SYSTOLIC BLOOD PRESSURE: 112 MMHG

## 2024-04-13 DIAGNOSIS — R11.2 NAUSEA AND VOMITING, UNSPECIFIED VOMITING TYPE: Primary | ICD-10-CM

## 2024-04-13 DIAGNOSIS — R10.13 ABDOMINAL PAIN, EPIGASTRIC: ICD-10-CM

## 2024-04-13 LAB
ALBUMIN SERPL-MCNC: 5.1 G/DL (ref 3.4–5)
ALBUMIN/GLOB SERPL: 1.5 {RATIO}
ALP SERPL-CCNC: 139 U/L (ref 40–129)
ALT SERPL-CCNC: 32 U/L (ref 10–40)
ANION GAP SERPL CALCULATED.3IONS-SCNC: 15 MMOL/L (ref 3–16)
AST SERPL-CCNC: 35 U/L (ref 15–37)
BASOPHILS # BLD: 0.03 K/UL (ref 0–0.2)
BASOPHILS NFR BLD: 0 %
BILIRUB DIRECT SERPL-MCNC: <0.2 MG/DL (ref 0–0.3)
BILIRUB INDIRECT SERPL-MCNC: ABNORMAL MG/DL (ref 0–1)
BILIRUB SERPL-MCNC: 0.8 MG/DL (ref 0–1)
BILIRUB UR QL STRIP: NEGATIVE
BUN SERPL-MCNC: 12 MG/DL (ref 7–20)
CALCIUM SERPL-MCNC: 11.3 MG/DL (ref 8.3–10.6)
CHLORIDE SERPL-SCNC: 101 MMOL/L (ref 99–110)
CLARITY UR: CLEAR
CO2 SERPL-SCNC: 22 MMOL/L (ref 21–32)
COLOR UR: YELLOW
COMMENT: ABNORMAL
CREAT SERPL-MCNC: 0.9 MG/DL (ref 0.5–1)
EOSINOPHIL # BLD: 0.09 K/UL (ref 0–0.6)
EOSINOPHILS RELATIVE PERCENT: 1 %
ERYTHROCYTE [DISTWIDTH] IN BLOOD BY AUTOMATED COUNT: 12.3 % (ref 12.4–15.4)
GFR SERPL CREATININE-BSD FRML MDRD: 86 ML/MIN/1.73M2
GLUCOSE SERPL-MCNC: 99 MG/DL (ref 70–99)
GLUCOSE UR STRIP-MCNC: NEGATIVE MG/DL
HCT VFR BLD AUTO: 43.3 % (ref 36–48)
HGB BLD-MCNC: 15.2 G/DL (ref 12–16)
HGB UR QL STRIP.AUTO: NEGATIVE
IMM GRANULOCYTES # BLD AUTO: 0.02 K/UL (ref 0–0.5)
IMM GRANULOCYTES NFR BLD: 0 %
KETONES UR STRIP-MCNC: ABNORMAL MG/DL
LEUKOCYTE ESTERASE UR QL STRIP: NEGATIVE
LIPASE SERPL-CCNC: 30 U/L (ref 13–60)
LYMPHOCYTES NFR BLD: 2.14 K/UL (ref 1–5.1)
LYMPHOCYTES RELATIVE PERCENT: 29 %
MCH RBC QN AUTO: 28.6 PG (ref 26–34)
MCHC RBC AUTO-ENTMCNC: 35.1 G/DL (ref 31–36)
MCV RBC AUTO: 81.4 FL (ref 80–100)
MONOCYTES NFR BLD: 0.57 K/UL (ref 0–1.3)
MONOCYTES NFR BLD: 8 %
NEUTROPHILS NFR BLD: 62 %
NEUTS SEG NFR BLD: 4.67 K/UL (ref 1.7–7.7)
NITRITE UR QL STRIP: NEGATIVE
PH UR STRIP: 7.5 [PH] (ref 5–8)
PLATELET # BLD AUTO: 288 K/UL (ref 135–450)
PLATELET CONFIRMATION: NORMAL
PMV BLD AUTO: 9.1 FL
POTASSIUM SERPL-SCNC: 3.4 MMOL/L (ref 3.5–5.1)
PROT SERPL-MCNC: 8.5 G/DL (ref 6.4–8.2)
PROT UR STRIP-MCNC: NEGATIVE MG/DL
RBC # BLD AUTO: 5.32 M/UL (ref 4–5.2)
SODIUM SERPL-SCNC: 138 MMOL/L (ref 136–145)
SP GR UR STRIP: 1.01 (ref 1–1.03)
UROBILINOGEN UR STRIP-ACNC: 0.2 EU/DL (ref 0–1)
WBC OTHER # BLD: 7.5 K/UL (ref 4–11)

## 2024-04-13 PROCEDURE — 96372 THER/PROPH/DIAG INJ SC/IM: CPT

## 2024-04-13 PROCEDURE — 2580000003 HC RX 258: Performed by: EMERGENCY MEDICINE

## 2024-04-13 PROCEDURE — 96375 TX/PRO/DX INJ NEW DRUG ADDON: CPT

## 2024-04-13 PROCEDURE — 83690 ASSAY OF LIPASE: CPT

## 2024-04-13 PROCEDURE — 85025 COMPLETE CBC W/AUTO DIFF WBC: CPT

## 2024-04-13 PROCEDURE — 82248 BILIRUBIN DIRECT: CPT

## 2024-04-13 PROCEDURE — 99284 EMERGENCY DEPT VISIT MOD MDM: CPT

## 2024-04-13 PROCEDURE — 81003 URINALYSIS AUTO W/O SCOPE: CPT

## 2024-04-13 PROCEDURE — 80053 COMPREHEN METABOLIC PANEL: CPT

## 2024-04-13 PROCEDURE — 96374 THER/PROPH/DIAG INJ IV PUSH: CPT

## 2024-04-13 PROCEDURE — 6360000002 HC RX W HCPCS: Performed by: EMERGENCY MEDICINE

## 2024-04-13 RX ORDER — ONDANSETRON 4 MG/1
4 TABLET, FILM COATED ORAL EVERY 8 HOURS PRN
Qty: 20 TABLET | Refills: 0 | Status: SHIPPED | OUTPATIENT
Start: 2024-04-13

## 2024-04-13 RX ORDER — ONDANSETRON 2 MG/ML
4 INJECTION INTRAMUSCULAR; INTRAVENOUS ONCE
Status: COMPLETED | OUTPATIENT
Start: 2024-04-13 | End: 2024-04-13

## 2024-04-13 RX ORDER — HYOSCYAMINE SULFATE 0.38 MG/1
0.38 TABLET, EXTENDED RELEASE ORAL EVERY 12 HOURS PRN
Qty: 30 TABLET | Refills: 0 | Status: SHIPPED | OUTPATIENT
Start: 2024-04-13

## 2024-04-13 RX ORDER — HYOSCYAMINE SULFATE 0.38 MG/1
375 TABLET, EXTENDED RELEASE ORAL EVERY 12 HOURS PRN
Qty: 60 TABLET | Refills: 3 | Status: SHIPPED | OUTPATIENT
Start: 2024-04-13 | End: 2024-04-13

## 2024-04-13 RX ORDER — METOCLOPRAMIDE 10 MG/1
10 TABLET ORAL
Qty: 10 TABLET | Refills: 0 | Status: SHIPPED | OUTPATIENT
Start: 2024-04-13

## 2024-04-13 RX ORDER — HYOSCYAMINE SULFATE 0.38 MG/1
375 TABLET, EXTENDED RELEASE ORAL EVERY 12 HOURS PRN
Qty: 12 TABLET | Refills: 3 | Status: SHIPPED | OUTPATIENT
Start: 2024-04-13 | End: 2024-04-13

## 2024-04-13 RX ORDER — KETOROLAC TROMETHAMINE 10 MG/1
10 TABLET, FILM COATED ORAL EVERY 6 HOURS PRN
Qty: 20 TABLET | Refills: 0 | Status: SHIPPED | OUTPATIENT
Start: 2024-04-13

## 2024-04-13 RX ORDER — HALOPERIDOL 5 MG/ML
5 INJECTION INTRAMUSCULAR ONCE
Status: COMPLETED | OUTPATIENT
Start: 2024-04-13 | End: 2024-04-13

## 2024-04-13 RX ORDER — PROMETHAZINE HYDROCHLORIDE 12.5 MG/1
12.5 TABLET ORAL EVERY 6 HOURS PRN
COMMUNITY

## 2024-04-13 RX ORDER — 0.9 % SODIUM CHLORIDE 0.9 %
1000 INTRAVENOUS SOLUTION INTRAVENOUS ONCE
Status: COMPLETED | OUTPATIENT
Start: 2024-04-13 | End: 2024-04-13

## 2024-04-13 RX ORDER — KETOROLAC TROMETHAMINE 30 MG/ML
30 INJECTION, SOLUTION INTRAMUSCULAR; INTRAVENOUS ONCE
Status: COMPLETED | OUTPATIENT
Start: 2024-04-13 | End: 2024-04-13

## 2024-04-13 RX ADMIN — SODIUM CHLORIDE 1000 ML: 9 INJECTION, SOLUTION INTRAVENOUS at 05:35

## 2024-04-13 RX ADMIN — KETOROLAC TROMETHAMINE 30 MG: 30 INJECTION, SOLUTION INTRAMUSCULAR; INTRAVENOUS at 05:53

## 2024-04-13 RX ADMIN — HALOPERIDOL LACTATE 5 MG: 5 INJECTION, SOLUTION INTRAMUSCULAR at 05:56

## 2024-04-13 RX ADMIN — ONDANSETRON 4 MG: 2 INJECTION INTRAMUSCULAR; INTRAVENOUS at 05:35

## 2024-04-13 ASSESSMENT — PAIN SCALES - GENERAL
PAINLEVEL_OUTOF10: 7
PAINLEVEL_OUTOF10: 7
PAINLEVEL_OUTOF10: 6

## 2024-04-13 ASSESSMENT — PAIN DESCRIPTION - DESCRIPTORS
DESCRIPTORS: CRAMPING;SHARP;SHOOTING
DESCRIPTORS: CRAMPING;DISCOMFORT;SHARP;STABBING

## 2024-04-13 ASSESSMENT — PAIN DESCRIPTION - ORIENTATION
ORIENTATION: LEFT;LOWER;UPPER;MID
ORIENTATION: LEFT;MID;LOWER;UPPER
ORIENTATION: LEFT;LOWER;MID;UPPER

## 2024-04-13 ASSESSMENT — PAIN DESCRIPTION - LOCATION
LOCATION: ABDOMEN

## 2024-04-13 ASSESSMENT — PAIN DESCRIPTION - FREQUENCY
FREQUENCY: CONTINUOUS
FREQUENCY: CONTINUOUS

## 2024-04-13 ASSESSMENT — PAIN DESCRIPTION - PAIN TYPE
TYPE: ACUTE PAIN;CHRONIC PAIN
TYPE: ACUTE PAIN;CHRONIC PAIN

## 2024-04-13 ASSESSMENT — PAIN - FUNCTIONAL ASSESSMENT
PAIN_FUNCTIONAL_ASSESSMENT: PREVENTS OR INTERFERES SOME ACTIVE ACTIVITIES AND ADLS
PAIN_FUNCTIONAL_ASSESSMENT: 0-10

## 2024-04-13 NOTE — DISCHARGE INSTR - COC
Continuity of Care Form    Patient Name: Danette Clark   :  1980  MRN:  [id number]    Admit date:  2024  Discharge date:  ***    Code Status Order: Prior   Advance Directives:     Admitting Physician:  No admitting provider for patient encounter.  PCP: Rojelio Lee MD    Discharging Nurse: ***  Discharging Hospital Unit/Room#:   Discharging Unit Phone Number: ***    Emergency Contact:   Extended Emergency Contact Information  Primary Emergency Contact: Igor Clark  Address: [address] [place] [address]  Home Phone: [phone]  Relation: Spouse    Past Surgical History:  Past Surgical History:   Procedure Laterality Date    ACHILLES TENDON SURGERY Right 2023    APPENDECTOMY  2007    CHOLECYSTECTOMY  2007    HYSTERECTOMY, VAGINAL      KNEE ARTHROSCOPY Right 2010    torn meniscus    KNEE SURGERY Right     Knee cap    OVARY REMOVAL Right     PCOS    SINUS SURGERY      YENNIFER AND BSO (CERVIX REMOVED)      ovarian cyst, adenomyosis- benign    TUBAL LIGATION      TYMPANOSTOMY TUBE PLACEMENT Bilateral     x 3        Immunization History:   Immunization History   Administered Date(s) Administered    COVID-19, MODERNA BLUE border, Primary or Immunocompromised, (age 12y+), IM, 100 mcg/0.5mL 2021, 2021    COVID-19, PFIZER PURPLE top, DILUTE for use, (age 12 y+), 30mcg/0.3mL 2021    Hepatitis B 2012, 08/10/2012, 01/15/2013    Hepatitis B (Recombivax HB) 2012, 08/10/2012, 01/15/2013    Influenza Vaccine, unspecified formulation 10/24/2010, 10/13/2017    Influenza, FLUARIX, FLULAVAL, FLUZONE (age 6 mo+) AND AFLURIA, (age 3 y+), PF, 0.5mL 2019, 2020    Influenza, High Dose (Fluzone 65 yrs and older) 2015    Influenza, Intradermal, Preservative free 10/02/2012, 10/31/2013    MMR, PRIORIX, M-M-R II, (age 12m+), SC, 0.5mL 1983    PPD Test 2012, 2012    TDaP, ADACEL (age 10y-64y),  Impairments/Disabilities:605809541}    Nutrition Therapy:  Current Nutrition Therapy:   { AMANDA Diet List:358333450}    Routes of Feeding: {[facility] DME Other Feedings:272042303}  Liquids: {Slp liquid thickness:66265}  Daily Fluid Restriction: {CHP DME Yes amt example:084112541}  Last Modified Barium Swallow with Video (Video Swallowing Test): {Done Not Done Date:}    Treatments at the Time of Hospital Discharge:   Respiratory Treatments: ***  Oxygen Therapy:  {Therapy; copd oxygen:58844}  Ventilator:    { CC Vent List:785394643}    Rehab Therapies: {THERAPEUTIC INTERVENTION:5060356218}  Weight Bearing Status/Restrictions: { CC Weight Bearin}  Other Medical Equipment (for information only, NOT a DME order):  {EQUIPMENT:453233708}  Other Treatments: ***    Patient's personal belongings (please select all that are sent with patient):  {[facility] DME Belongings:633065557}    RN SIGNATURE:  {Esignature:391058702}    CASE MANAGEMENT/SOCIAL WORK SECTION    Inpatient Status Date: ***    Readmission Risk Assessment Score:  Readmission Risk              Risk of Unplanned Readmission:  0           Discharging to Facility/ Agency   Name:   Address:  Phone:  Fax:    Dialysis Facility (if applicable)   Name:  Address:  Dialysis Schedule:  Phone:  Fax:    / signature: {Esignature:174097939}    PHYSICIAN SECTION    Prognosis: {Prognosis:7988527122}    Condition at Discharge: { Patient Condition:781644738}    Rehab Potential (if transferring to Rehab): {Prognosis:5847789488}    Recommended Labs or Other Treatments After Discharge: ***    Physician Certification: I certify the above information and transfer of Danette Clark  is necessary for the continuing treatment of the diagnosis listed and that she requires {Admit to Appropriate Level of Care:93043} for {GREATER/LESS:503075112} 30 days.     Update Admission H&P: {CHP DME Changes in HandP:818388490}    PHYSICIAN SIGNATURE:

## 2024-04-13 NOTE — ED PROVIDER NOTES
I PERSONALLY SAW THE PATIENT AND PERFORMED A SUBSTANTIVE PORTION OF THE VISIT INCLUDING ALL ASPECTS OF THE MEDICAL DECISION MAKING PROCESS.    [facility] EMERGENCY DEPT  EMERGENCY DEPARTMENT ENCOUNTER      Pt Name: Danette Clark  MRN: [id number]  Birthdate 1980  Date of evaluation: 4/13/2024  Provider: René Yanez MD    CHIEF COMPLAINT       Chief Complaint   Patient presents with    Abdominal Pain    Nausea    Emesis       HISTORY OF PRESENT ILLNESS    Danette Clark is a 44 y.o. female who presents to the emergency department with chronic abdominal pain.  Patient presents with left sided quadrant abdominal pain.  Chronic in nature.  Positive for nausea and vomiting.  Difficulty eating.  Had a CT 2 weeks ago that was reassuring.  States she has seen a GI physician but not in a while.  History of gastritis and IBS.  No chest pain or shortness of breath.  Pain is not any worse than usual.  She states difficulty eating and drinking.  Ran out of nausea medicine.  No fevers or chills.  No other associated symptoms.  No rash.  No leg swelling.    Nursing Notes were reviewed. Including nursing noted for FM, Surgical History, Past Medical History, Social History, vitals, and allergies; agree with all.     REVIEW OF SYSTEMS       Review of Systems    Except as noted above the remainder of the review of systems was reviewed and negative.     PAST MEDICAL HISTORY     Past Medical History:   Diagnosis Date    ADHD (attention deficit hyperactivity disorder) 2023    Anxiety     Arthritis     Bipolar affective disorder (HCC)     Borderline personality disorder (HCC)     Depression     bipolar disorder    Headache     IBS (irritable bowel syndrome)     Neck pain     Post hysterectomy menopause syndrome        SURGICAL HISTORY       Past Surgical History:   Procedure Laterality Date    ACHILLES TENDON SURGERY Right 11/01/2023    APPENDECTOMY  2007    CHOLECYSTECTOMY  2007    HYSTERECTOMY, VAGINAL      KNEE  who either signs or Co-signs this chart in the absence of acardiologist.    Interpreted by myself     RADIOLOGY:   Non-plain film images such as CT, Ultrasoundand MRI are read by the radiologist. Plain radiographic images are visualized and preliminarily interpreted by the emergency physician with the below findings:    None    ED BEDSIDE ULTRASOUND:   Performed by ED Physician - none    LABS:  Labs Reviewed   BASIC METABOLIC PANEL - Abnormal; Notable for the following components:       Result Value    Potassium 3.4 (*)     Calcium 11.3 (*)     All other components within normal limits   HEPATIC FUNCTION PANEL - Abnormal; Notable for the following components:    Albumin 5.1 (*)     Alkaline Phosphatase 139 (*)     Total Protein 8.5 (*)     All other components within normal limits   URINALYSIS WITH REFLEX TO CULTURE - Abnormal; Notable for the following components:    Ketones, Urine TRACE (*)     All other components within normal limits   LIPASE   CBC WITH AUTO DIFFERENTIAL   PLATELET CONFIRMATION     All other labs were withinnormal range or not returned as of this dictation.    EMERGENCY DEPARTMENT COURSE and DIFFERENTIAL DIAGNOSIS/MDM:     PMH, Surgical Hx, FH, Social Hx reviewed by myself (ETOH usage, Tobacco usage, Drug usage reviewed by myself, no pertinent Hx)- No Pertinent Hx     Old records were reviewed by me     MDM 44-year-old with abdominal pain.  Chronic.  Labs reassuring.  Weight around baseline.  Patient had CT 2 weeks ago will not repeat at this time.  No significant signs of dehydration noted.  Her symptoms are exactly the same as 2 weeks ago.  Tolerating p.o.  Outpatient follow-up.  GI follow-up given.  Strict turn precautions.  Patient understands.    I estimate there is LOW risk for Sepsis, MI, Stroke, Tamponade, PTX, Toxicity or other life threatening etiology thus I consider the discharge disposition reasonable.     The patient is at low risk for mortality based on demographic, history and

## 2024-04-13 NOTE — ED NOTES
Patient discharged to home with  driving in good condition. Vital signs remain stable and within normal limits. Was provided with copy of discharge instructions and all questions were answered. Follow up care was explained to patient and they expressed agreement with the treatment plan for follow up. Respirations remain even and unlabored. No acute distress is noted.

## 2024-04-13 NOTE — ED TRIAGE NOTES
Pt ambulates into the ER from home with complaint of LUQ and LLQ abd pain that is constant. Pt reports symptoms started two months ago.  Pt took bentyl and phenergan at 0100 without relief prior to arrival.  Pt is A&OX4. Respirations are even and unlabored. Skin is warm, appropriate for ethnicity, and dry. No acute distress noted.

## 2024-05-06 ENCOUNTER — OFFICE VISIT (OUTPATIENT)
Dept: PRIMARY CARE CLINIC | Age: 44
End: 2024-05-06
Payer: COMMERCIAL

## 2024-05-06 VITALS
WEIGHT: 146.2 LBS | HEART RATE: 91 BPM | OXYGEN SATURATION: 99 % | SYSTOLIC BLOOD PRESSURE: 120 MMHG | BODY MASS INDEX: 25.91 KG/M2 | DIASTOLIC BLOOD PRESSURE: 80 MMHG | HEIGHT: 63 IN

## 2024-05-06 DIAGNOSIS — R63.4 WEIGHT LOSS: ICD-10-CM

## 2024-05-06 DIAGNOSIS — R68.2 DRY MOUTH: Primary | ICD-10-CM

## 2024-05-06 DIAGNOSIS — Z82.69 FAMILY HISTORY OF SJOGREN'S DISEASE: ICD-10-CM

## 2024-05-06 DIAGNOSIS — R68.2 DRY MOUTH: ICD-10-CM

## 2024-05-06 LAB
ALBUMIN SERPL-MCNC: 4.7 G/DL (ref 3.4–5)
ALP SERPL-CCNC: 104 U/L (ref 40–129)
ALT SERPL-CCNC: 16 U/L (ref 10–40)
ANION GAP SERPL CALCULATED.3IONS-SCNC: 12 MMOL/L (ref 3–16)
AST SERPL-CCNC: 19 U/L (ref 15–37)
BASOPHILS # BLD: 0.1 K/UL (ref 0–0.2)
BASOPHILS NFR BLD: 0.7 %
BILIRUB DIRECT SERPL-MCNC: <0.2 MG/DL (ref 0–0.3)
BILIRUB INDIRECT SERPL-MCNC: NORMAL MG/DL (ref 0–1)
BILIRUB SERPL-MCNC: 0.7 MG/DL (ref 0–1)
BUN SERPL-MCNC: 12 MG/DL (ref 7–20)
CALCIUM SERPL-MCNC: 9.7 MG/DL (ref 8.3–10.6)
CHLORIDE SERPL-SCNC: 104 MMOL/L (ref 99–110)
CO2 SERPL-SCNC: 24 MMOL/L (ref 21–32)
CREAT SERPL-MCNC: 0.7 MG/DL (ref 0.6–1.1)
CRP SERPL-MCNC: <3 MG/L (ref 0–5.1)
DEPRECATED RDW RBC AUTO: 13.7 % (ref 12.4–15.4)
EOSINOPHIL # BLD: 0.1 K/UL (ref 0–0.6)
EOSINOPHIL NFR BLD: 1.3 %
ERYTHROCYTE [SEDIMENTATION RATE] IN BLOOD BY WESTERGREN METHOD: 13 MM/HR (ref 0–20)
GFR SERPLBLD CREATININE-BSD FMLA CKD-EPI: >90 ML/MIN/{1.73_M2}
GLUCOSE SERPL-MCNC: 83 MG/DL (ref 70–99)
HCT VFR BLD AUTO: 41.8 % (ref 36–48)
HGB BLD-MCNC: 14.2 G/DL (ref 12–16)
LYMPHOCYTES # BLD: 1.5 K/UL (ref 1–5.1)
LYMPHOCYTES NFR BLD: 19.5 %
MCH RBC QN AUTO: 28.2 PG (ref 26–34)
MCHC RBC AUTO-ENTMCNC: 34 G/DL (ref 31–36)
MCV RBC AUTO: 83 FL (ref 80–100)
MONOCYTES # BLD: 0.5 K/UL (ref 0–1.3)
MONOCYTES NFR BLD: 7.1 %
NEUTROPHILS # BLD: 5.4 K/UL (ref 1.7–7.7)
NEUTROPHILS NFR BLD: 71.4 %
PLATELET # BLD AUTO: 242 K/UL (ref 135–450)
PMV BLD AUTO: 7.8 FL (ref 5–10.5)
POTASSIUM SERPL-SCNC: 3.6 MMOL/L (ref 3.5–5.1)
PROT SERPL-MCNC: 6.9 G/DL (ref 6.4–8.2)
RBC # BLD AUTO: 5.03 M/UL (ref 4–5.2)
RHEUMATOID FACT SER IA-ACNC: <10 IU/ML
SODIUM SERPL-SCNC: 140 MMOL/L (ref 136–145)
TSH SERPL DL<=0.005 MIU/L-ACNC: 0.69 UIU/ML (ref 0.27–4.2)
WBC # BLD AUTO: 7.6 K/UL (ref 4–11)

## 2024-05-06 PROCEDURE — 99214 OFFICE O/P EST MOD 30 MIN: CPT | Performed by: FAMILY MEDICINE

## 2024-05-06 ASSESSMENT — ENCOUNTER SYMPTOMS
ABDOMINAL DISTENTION: 0
RECTAL PAIN: 0
ANAL BLEEDING: 0
CONSTIPATION: 0
RESPIRATORY NEGATIVE: 1
NAUSEA: 1
EYES NEGATIVE: 1
ABDOMINAL PAIN: 1
BLOOD IN STOOL: 0

## 2024-05-06 NOTE — PROGRESS NOTES
SUBJECTIVE:  Patient ID: Danette Clark is a 44 y.o. y.o. female     HPI   Patient presented today with several concerns she has not been feeling good but able to eat and drink well, never she eats or drink anything she gets very sick sick to her stomach abdominal pain discomfort none of the medication had helped her she was given Protonix Zofran and Reglan to use as needed also Bentyl did not help  She complaint began dry mouth very frequent her sister was diagnosed with Sjogren syndrome she is worried about that  She is scheduled to have a colonoscopy she already had endoscopy was told she has some gastritis and that is why she was put on pantoprazole twice a day  Her daughter was diagnosed with von Willebrand disease she wants to be checked  Patient with bipolar disorder monitored and followed by psychiatry  stable on current management  Past Medical History:   Diagnosis Date    ADHD (attention deficit hyperactivity disorder) 2023    Anxiety     Arthritis     Bipolar affective disorder (HCC)     Borderline personality disorder (HCC)     Depression     bipolar disorder    Headache     IBS (irritable bowel syndrome)     Neck pain     Post hysterectomy menopause syndrome       Past Surgical History:   Procedure Laterality Date    ACHILLES TENDON SURGERY Right 11/01/2023    APPENDECTOMY  2007    CHOLECYSTECTOMY  2007    HYSTERECTOMY, VAGINAL      KNEE ARTHROSCOPY Right 2010    torn meniscus    KNEE SURGERY Right 2005    Knee cap    OVARY REMOVAL Right     PCOS    SINUS SURGERY  2000    YENNIFER AND BSO (CERVIX REMOVED)  2008    ovarian cyst, adenomyosis- benign    TUBAL LIGATION  2005    TYMPANOSTOMY TUBE PLACEMENT Bilateral     x 3      Family History   Problem Relation Age of Onset    Hypertension Mother     Mental Retardation Father         bipolar    Arthritis Father     Depression Father     Mental Illness Father         bipolar    Substance Abuse Father         alcoholic    Mental Retardation Sister

## 2024-05-07 LAB
ENA SCL70 IGG SER IA-ACNC: <0.2 AI (ref 0–0.9)
ENA SS-A AB SER IA-ACNC: <0.2 AI (ref 0–0.9)
ENA SS-B AB SER IA-ACNC: <0.2 AI (ref 0–0.9)

## 2024-05-09 LAB
FACT VIII ACT/NOR PPP: 86 % (ref 56–191)
VWF AG ACT/NOR PPP IA: 76 % (ref 52–214)
VWF:RCO ACT/NOR PPP PL AGG: 53 % (ref 51–215)

## 2024-05-13 ENCOUNTER — OFFICE VISIT (OUTPATIENT)
Dept: PRIMARY CARE CLINIC | Age: 44
End: 2024-05-13
Payer: COMMERCIAL

## 2024-05-13 ENCOUNTER — PATIENT MESSAGE (OUTPATIENT)
Dept: PRIMARY CARE CLINIC | Age: 44
End: 2024-05-13

## 2024-05-13 VITALS
OXYGEN SATURATION: 98 % | BODY MASS INDEX: 25.33 KG/M2 | HEART RATE: 87 BPM | TEMPERATURE: 97.6 F | SYSTOLIC BLOOD PRESSURE: 120 MMHG | WEIGHT: 143 LBS | DIASTOLIC BLOOD PRESSURE: 84 MMHG

## 2024-05-13 DIAGNOSIS — R10.9 ABDOMINAL PAIN, UNSPECIFIED ABDOMINAL LOCATION: Primary | ICD-10-CM

## 2024-05-13 DIAGNOSIS — R63.4 WEIGHT LOSS: ICD-10-CM

## 2024-05-13 PROCEDURE — 99213 OFFICE O/P EST LOW 20 MIN: CPT | Performed by: FAMILY MEDICINE

## 2024-05-13 RX ORDER — SUCRALFATE 1 G/1
1 TABLET ORAL 4 TIMES DAILY
Qty: 120 TABLET | Refills: 1 | Status: SHIPPED | OUTPATIENT
Start: 2024-05-13

## 2024-05-13 ASSESSMENT — ENCOUNTER SYMPTOMS
CONSTIPATION: 0
VOMITING: 0
ABDOMINAL PAIN: 1
NAUSEA: 1
ANAL BLEEDING: 0
DIARRHEA: 0
EYES NEGATIVE: 1
RECTAL PAIN: 0
BLOOD IN STOOL: 0
RESPIRATORY NEGATIVE: 1

## 2024-05-13 NOTE — PROGRESS NOTES
SUBJECTIVE:  Patient ID: Danette Clark is a 44 y.o. y.o. female     HPI   Patient presented for follow-up and abdominal pain is continue to have abdominal pain more when she eats she gets very upset stomach started multiple medication nothing is working for her epigastric and left side  She is scheduled to have a swallow study  After she spoke with her GI  She is on a PPI twice daily she has antinausea medication dyspepsia medication nothing been helping  She continues to lose weight  We did a complete workup to exclude any possible autoimmune disease came back negative  According to patient she is been on Lexapro for years never bothered her  Discussed possible side effect of her current medication which she does not think so  Past Medical History:   Diagnosis Date    ADHD (attention deficit hyperactivity disorder) 2023    Anxiety     Arthritis     Bipolar affective disorder (HCC)     Borderline personality disorder (HCC)     Depression     bipolar disorder    Headache     IBS (irritable bowel syndrome)     Neck pain     Post hysterectomy menopause syndrome       Past Surgical History:   Procedure Laterality Date    ACHILLES TENDON SURGERY Right 11/01/2023    APPENDECTOMY  2007    CHOLECYSTECTOMY  2007    HYSTERECTOMY, VAGINAL      KNEE ARTHROSCOPY Right 2010    torn meniscus    KNEE SURGERY Right 2005    Knee cap    OVARY REMOVAL Right     PCOS    SINUS SURGERY  2000    YENNIFER AND BSO (CERVIX REMOVED)  2008    ovarian cyst, adenomyosis- benign    TUBAL LIGATION  2005    TYMPANOSTOMY TUBE PLACEMENT Bilateral     x 3      Family History   Problem Relation Age of Onset    Hypertension Mother     Mental Retardation Father         bipolar    Arthritis Father     Depression Father     Mental Illness Father         bipolar    Substance Abuse Father         alcoholic    Mental Retardation Sister         bipolar    Depression Sister     Mental Illness Sister         bipolar    Arthritis Maternal Grandmother     Arthritis

## 2024-05-13 NOTE — TELEPHONE ENCOUNTER
From: Danette Clark  To: Dr. Rojelio Lee  Sent: 5/13/2024 10:23 AM EDT  Subject: Feeling bad     Last night and into today I’m having bad pain in the abdomen. I have had many bowel movements too. They’re solid. I feel weak and tired. I had 1 cookie last night and that’s when it all got really bad. I haven’t gotten a fever but have been cold and that’s unusual as well. I’m not sure what to do. All of my blood tests came back normal, I also have a gastric emptying study scheduled on the 24 and a colonoscopy on the 31. I don’t know what else to do I’m really not feeling well

## 2024-05-24 ENCOUNTER — HOSPITAL ENCOUNTER (OUTPATIENT)
Age: 44
Discharge: HOME | End: 2024-05-24
Attending: INTERNAL MEDICINE
Payer: COMMERCIAL

## 2024-05-24 DIAGNOSIS — R11.0 NAUSEA: ICD-10-CM

## 2024-05-24 PROCEDURE — 78264 GASTRIC EMPTYING IMG STUDY: CPT

## 2024-05-24 PROCEDURE — A9541 TC99M SULFUR COLLOID: HCPCS | Performed by: INTERNAL MEDICINE

## 2024-05-24 PROCEDURE — 3430000000 HC RX DIAGNOSTIC RADIOPHARMACEUTICAL: Performed by: INTERNAL MEDICINE

## 2024-05-24 RX ADMIN — Medication 1.07 MILLICURIE: at 07:40

## 2024-06-12 DIAGNOSIS — G43.819 OTHER MIGRAINE WITHOUT STATUS MIGRAINOSUS, INTRACTABLE: ICD-10-CM

## 2024-06-12 NOTE — TELEPHONE ENCOUNTER
Medication:   Requested Prescriptions     Pending Prescriptions Disp Refills    rizatriptan (MAXALT) 10 MG tablet [Pharmacy Med Name: RIZATRIPTAN 10MG TABLETS] 12 tablet 2     Sig: TAKE 1 TABLET BY MOUTH 1 TIME AS NEEDED FOR MIGRAINE. MAY REPEAT IN 2 HOURS AS NEEDED     Last Filled:  2.27.24    Last appt: 5/13/2024   Next appt: Visit date not found    Last OARRS:        No data to display

## 2024-06-13 ENCOUNTER — TELEPHONE (OUTPATIENT)
Dept: PRIMARY CARE CLINIC | Age: 44
End: 2024-06-13

## 2024-06-13 RX ORDER — RIZATRIPTAN BENZOATE 10 MG/1
TABLET ORAL
Qty: 12 TABLET | Refills: 2 | Status: SHIPPED | OUTPATIENT
Start: 2024-06-13

## 2024-06-13 NOTE — TELEPHONE ENCOUNTER
Can you please check with the patient if she has been on any other alternative?( The ones listed by her pharmacy)

## 2024-06-13 NOTE — TELEPHONE ENCOUNTER
Received fax from [employer] stating Rizatriptan 10mg is not covered by insurance.  Alternative:  Sumatriptan, Eletriptant, Naratriptant,Zolmitriptant and Zolmitriptant

## 2024-06-14 NOTE — TELEPHONE ENCOUNTER
Pt states she received message from [employer] today stating her Rizatriptan was ready for  and will be $0 cost so she stated she should be fine but if that changes she will call our office

## 2024-07-08 ENCOUNTER — PATIENT MESSAGE (OUTPATIENT)
Dept: PRIMARY CARE CLINIC | Age: 44
End: 2024-07-08

## 2024-07-08 NOTE — TELEPHONE ENCOUNTER
From: Danette Clark  To: Dr. Rojelio Lee  Sent: 7/8/2024 8:20 AM EDT  Subject: Neck and shoulder pain    Hi. I’m unable to get in with you and I am having terrible neck and shoulder pain. I’ve been seeing a chiropractor and getting massage and last night it was so bad I barely slept. 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 Is there anything else that can be done without an appointment?

## 2024-07-09 NOTE — PROGRESS NOTES
General (Family Medicine)  Rojelio Lee MD as PCP - Empaneled Provider    Electronically signed by FLORY Pena CNP on 7/11/2024 at 10:44 AM     This dictation was generated by voice recognition computer software.  Although all attempts are made to edit the dictation for accuracy, there may be errors in the transcription that are not intended.

## 2024-07-11 ENCOUNTER — OFFICE VISIT (OUTPATIENT)
Dept: PRIMARY CARE CLINIC | Age: 44
End: 2024-07-11
Payer: COMMERCIAL

## 2024-07-11 VITALS
SYSTOLIC BLOOD PRESSURE: 118 MMHG | OXYGEN SATURATION: 99 % | HEART RATE: 79 BPM | BODY MASS INDEX: 24.8 KG/M2 | TEMPERATURE: 97.6 F | WEIGHT: 140 LBS | RESPIRATION RATE: 16 BRPM | DIASTOLIC BLOOD PRESSURE: 76 MMHG

## 2024-07-11 DIAGNOSIS — R20.2 PARESTHESIA OF UPPER EXTREMITY: ICD-10-CM

## 2024-07-11 DIAGNOSIS — M50.30 DDD (DEGENERATIVE DISC DISEASE), CERVICAL: ICD-10-CM

## 2024-07-11 DIAGNOSIS — M54.2 NECK PAIN: Primary | ICD-10-CM

## 2024-07-11 PROCEDURE — 99214 OFFICE O/P EST MOD 30 MIN: CPT | Performed by: NURSE PRACTITIONER

## 2024-07-11 RX ORDER — METHYLPREDNISOLONE 4 MG/1
TABLET ORAL
Qty: 1 KIT | Refills: 0 | Status: SHIPPED | OUTPATIENT
Start: 2024-07-11

## 2024-07-11 RX ORDER — CYCLOBENZAPRINE HCL 10 MG
10 TABLET ORAL 3 TIMES DAILY PRN
Qty: 30 TABLET | Refills: 0 | Status: SHIPPED | OUTPATIENT
Start: 2024-07-11 | End: 2024-07-21

## 2024-07-11 ASSESSMENT — ENCOUNTER SYMPTOMS
SHORTNESS OF BREATH: 0
NAUSEA: 1
BACK PAIN: 1
DIARRHEA: 0
COUGH: 0
WHEEZING: 0
VOMITING: 0

## 2024-07-18 ENCOUNTER — HOSPITAL ENCOUNTER (OUTPATIENT)
Dept: MRI IMAGING | Age: 44
Discharge: HOME | End: 2024-07-18
Payer: COMMERCIAL

## 2024-07-18 ENCOUNTER — TELEPHONE (OUTPATIENT)
Dept: FAMILY MEDICINE CLINIC | Age: 44
End: 2024-07-18

## 2024-07-18 DIAGNOSIS — M54.2 NECK PAIN: ICD-10-CM

## 2024-07-18 DIAGNOSIS — R20.2 PARESTHESIA OF UPPER EXTREMITY: ICD-10-CM

## 2024-07-18 DIAGNOSIS — M50.30 DDD (DEGENERATIVE DISC DISEASE), CERVICAL: ICD-10-CM

## 2024-07-18 PROCEDURE — 72141 MRI NECK SPINE W/O DYE: CPT

## 2024-07-18 NOTE — TELEPHONE ENCOUNTER
SUBMITTED PA FOR Qulipta 60MG tablets  VIA CMM Key: [id number] STATUS NOT SENT TO PLAN. PLEASE PROVIDE A DX CODE FOR THE MEDICATION BEING REQUESTED      FOLLOW UP DONE DAILY: IF NO RESPONSE IN 3 DAYS WE WILL REFAX FOR STATUS CHECK. IF ANOTHER 3 DAYS GOES BY WITH NO RESPONSE WILL CALL INSURANCE FOR STATUS.

## 2024-07-23 NOTE — TELEPHONE ENCOUNTER
THANK YOU SO MUCH FOR THE DX.     WE ARE NOT PERMITTED TO LOOK UP THE ACTUAL CODES. PLEASE PROVIDE THE ACTUAL DX CODE FOR THE MEDICATION BEING REQUESTED      If this requires a response please respond to the pool ( P [facility] MEDICATION PRE-AUTH).      Thank you please advise patient.

## 2024-07-23 NOTE — TELEPHONE ENCOUNTER
SENT TO PLAN    If this requires a response please respond to the pool ( P [facility]SC MEDICATION PRE-AUTH).      Thank you please advise patient.

## 2024-07-23 NOTE — TELEPHONE ENCOUNTER
The medication is APPROVED. LETTER AVAILABLE IN MEDIA  APPROVED 07/23/2024 - 07/23/2025    If this requires a response please respond to the pool ( P MHC[facility] PSC MEDICATION PRE-AUTH).      Thank you please advise patient.

## 2024-07-24 ENCOUNTER — OFFICE VISIT (OUTPATIENT)
Dept: ORTHOPEDIC SURGERY | Age: 44
End: 2024-07-24
Payer: COMMERCIAL

## 2024-07-24 VITALS — HEIGHT: 63 IN | BODY MASS INDEX: 24.8 KG/M2 | WEIGHT: 140 LBS

## 2024-07-24 DIAGNOSIS — M54.2 CHRONIC NECK PAIN: Primary | ICD-10-CM

## 2024-07-24 DIAGNOSIS — M50.30 DDD (DEGENERATIVE DISC DISEASE), CERVICAL: ICD-10-CM

## 2024-07-24 DIAGNOSIS — M79.18 CERVICAL MYOFASCIAL PAIN SYNDROME: ICD-10-CM

## 2024-07-24 DIAGNOSIS — G89.29 CHRONIC NECK PAIN: Primary | ICD-10-CM

## 2024-07-24 PROCEDURE — 99204 OFFICE O/P NEW MOD 45 MIN: CPT | Performed by: PHYSICIAN ASSISTANT

## 2024-07-24 RX ORDER — MELOXICAM 15 MG/1
15 TABLET ORAL DAILY PRN
Qty: 30 TABLET | Refills: 0 | Status: SHIPPED | OUTPATIENT
Start: 2024-07-24

## 2024-07-24 NOTE — PROGRESS NOTES
New Patient: SPINE    7/24/2024     CHIEF COMPLAINT:    Chief Complaint   Patient presents with    New Patient     CERVICAL     REFERRED BY RAYMOND TRUONG CNP       HISTORY OF PRESENT ILLNESS:              The patient is a 44 y.o. female history of ADHD, anxiety, bipolar affective disorder, depression referred by Raymond Truong CNP for chronic worsening neck pain.  She reports chronic underlying aching midline neck/trap pain with tightness.  Her symptoms have been more severe over the last 3 months.  She states her symptoms are now fairly constant.  She reports muscle \"knots\".  Her pain is increased with sitting, bending or resting at times.  She reports some relief with standing and walking.  Conservative care includes prior PT, chiropractics, BRYSON, Flexeril, ibuprofen, MDP.  She denies any significant improvement at this time.  She states intermittently she will experience a fatigued feeling in her right upper extremity.  She currently denies any distal radiating pain.  She denies any numbness tingling or progressive extremity weakness.  She denies any fine motor difficulty or gait instability.  No recent injury or trauma.  No recent fevers chills or infections.    The pain assessment was noted & reviewed in the medical record today.     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

## 2024-07-30 ENCOUNTER — HOSPITAL ENCOUNTER (OUTPATIENT)
Dept: PHYSICAL THERAPY | Age: 44
Setting detail: THERAPIES SERIES
Discharge: HOME | End: 2024-07-30
Payer: COMMERCIAL

## 2024-07-30 DIAGNOSIS — M54.2 NECK PAIN: Primary | ICD-10-CM

## 2024-07-30 PROCEDURE — 97110 THERAPEUTIC EXERCISES: CPT | Performed by: PHYSICAL THERAPIST

## 2024-07-30 PROCEDURE — 97161 PT EVAL LOW COMPLEX 20 MIN: CPT | Performed by: PHYSICAL THERAPIST

## 2024-07-30 PROCEDURE — 97140 MANUAL THERAPY 1/> REGIONS: CPT | Performed by: PHYSICAL THERAPIST

## 2024-07-30 NOTE — PLAN OF CARE
[facility]- Outpatient Rehabilitation and Therapy [address], [place] [address] office: [phone] fax: [phone]     Physical Therapy Initial Evaluation Certification      Dear Alise Mccormick,*,    We had the pleasure of evaluating the following patient for physical therapy services at [facility].  A summary of our findings can be found in the initial assessment below.  This includes our plan of care.  If you have any questions or concerns regarding these findings, please do not hesitate to contact me at the office phone number listed above.  Thank you for the referral.     Physician Signature:_______________________________Date:__________________  By signing above (or electronic signature), therapist’s plan is approved by physician       Physical Therapy: TREATMENT/PROGRESS NOTE   Patient: Danette Clark (44 y.o. female)   Examination Date: 2024   :  1980 MRN: [id number]   Visit #: 1   Insurance Allowable Auth Needed   20 [x]Yes    []No    Insurance: Payor: [facility] / Plan: [facility] - [place] PPO / Product Type: *No Product type* /   Insurance ID: [id number] - ([facility])  Secondary Insurance (if applicable):    Treatment Diagnosis:     ICD-10-CM    1. Neck pain  M54.2          Medical Diagnosis:  Chronic neck pain [M54.2, G89.29]  Cervical myofascial pain syndrome [M79.18]  DDD (degenerative disc disease), cervical [M50.30]   Referring Physician: Alise Mccormick,*  PCP: Rojelio Lee MD     Plan of care signed (Y/N):     Date of Patient follow up with Physician:      Plan of Care Report: EVAL today  POC update due: (10 visits /OR AUTH LIMITS, whichever is less)  2024                                             Medical History:  Comorbidities:  Osteoarthritis  Anxiety  Depression  COVID-19  Relevant Medical History:                                          Precautions/ Contra-indications:           Latex allergy:

## 2024-08-02 ENCOUNTER — TELEPHONE (OUTPATIENT)
Dept: ORTHOPEDIC SURGERY | Age: 44
End: 2024-08-02

## 2024-08-02 NOTE — TELEPHONE ENCOUNTER
S/W patient who stated that she is discontinuing meloxicam due to her fear of it giving her stomach issues. I advised that while this is possible, using the meloxicam for as little as one week is unlikely to yield significant damage to her stomach. Patient stated that is was helping at all and she is not going to take it. I spoke with the patient about other conservative interventions including at home traction unit, heat and gentle massage. Patient stated she is already using those methods.     I advised patient that Mamadou would reach out to her upon her return next week.

## 2024-08-02 NOTE — TELEPHONE ENCOUNTER
Prescription Refill     Medication Name:  PAIN MEDICATION  Pharmacy: [facility]   Patient Contact Number:  [phone]     PATIENT CALLED REGARDING THE MELOXICAM MEDICATION SHE WAS PRESCRIBED    SHE STATED THE MEDICATION IS NOT BEING EFFECTIVE SO SHE IS WANTING TO KNOW IF ALTERNATIVE  MEDICATION CAN BE CALLED IN AS SHE DOES NOT WANT TO CONTINUE TAKING THE MELOXICAM MEDICATION BECAUSE IT CAN CAUSE STOMACH ISSUES    SHE IS ASKING FOR THIS TO BE SENT TO [facility] ON [address]    PLEASE CALL PATIENT BACK AT THE ABOVE NUMBER

## 2024-08-05 ENCOUNTER — TELEPHONE (OUTPATIENT)
Dept: ORTHOPEDIC SURGERY | Age: 44
End: 2024-08-05

## 2024-08-05 DIAGNOSIS — M54.2 CHRONIC NECK PAIN: ICD-10-CM

## 2024-08-05 DIAGNOSIS — G89.29 CHRONIC NECK PAIN: ICD-10-CM

## 2024-08-05 DIAGNOSIS — M79.18 CERVICAL MYOFASCIAL PAIN SYNDROME: ICD-10-CM

## 2024-08-05 DIAGNOSIS — M50.30 DDD (DEGENERATIVE DISC DISEASE), CERVICAL: Primary | ICD-10-CM

## 2024-08-05 RX ORDER — DICLOFENAC SODIUM 75 MG/1
75 TABLET, DELAYED RELEASE ORAL 2 TIMES DAILY PRN
Qty: 60 TABLET | Refills: 0 | Status: SHIPPED | OUTPATIENT
Start: 2024-08-05

## 2024-08-05 NOTE — TELEPHONE ENCOUNTER
L/M for the patient informing her I was returning her call. Patient was informed that I also responded to her [employer] message. Patient was informed that since Mobic is not working for her we can switch the patient over to Voltaren. Patient was informed she would need to discontinue the Mobic and just take the Voltaren/diclofenac twice a day as needed for her pain. Patient was instructed that an OV would need to be made in order to discuss any stronger type of pain medication. Patient may respond via xPeerient or call if wishing to proceed with diclofenac.

## 2024-08-06 ENCOUNTER — HOSPITAL ENCOUNTER (OUTPATIENT)
Dept: PHYSICAL THERAPY | Age: 44
Setting detail: THERAPIES SERIES
Discharge: HOME | End: 2024-08-06
Payer: COMMERCIAL

## 2024-08-06 PROCEDURE — 97140 MANUAL THERAPY 1/> REGIONS: CPT | Performed by: PHYSICAL THERAPIST

## 2024-08-06 PROCEDURE — 97110 THERAPEUTIC EXERCISES: CPT | Performed by: PHYSICAL THERAPIST

## 2024-08-06 NOTE — FLOWSHEET NOTE
[facility]- Outpatient Rehabilitation and Therapy [address], [place] [address] office: [phone] fax: [phone]       Physical Therapy: TREATMENT/PROGRESS NOTE   Patient: Danette Clark (44 y.o. female)   Examination Date: 2024   :  1980 MRN: [id number]   Visit #: 2   Insurance Allowable Auth Needed   20 [x]Yes    []No    Insurance: Payor: [facility] / Plan: [employer] - [place] PPO / Product Type: *No Product type* /   Insurance ID: [id number] - ([facility])  Secondary Insurance (if applicable):    Treatment Diagnosis:     ICD-10-CM    1. Neck pain  M54.2          Medical Diagnosis:  Chronic neck pain [M54.2, G89.29]  Cervical myofascial pain syndrome [M79.18]  DDD (degenerative disc disease), cervical [M50.30]   Referring Physician: Alise Mccormick,*  PCP: Rojelio Lee MD     Plan of care signed (Y/N):     Date of Patient follow up with Physician:      Plan of Care Report: NO  POC update due: (10 visits /OR AUTH LIMITS, whichever is less)  2024                                             Medical History:  Comorbidities:  Osteoarthritis  Anxiety  Depression  COVID-19  Relevant Medical History:                                          Precautions/ Contra-indications:           Latex allergy:  NO  Pacemaker:    NO  Contraindications for Manipulation: None  Date of Surgery:   Other:    Red Flags:  None    Suicide Screening:   The patient did not verbalize a primary behavioral concern, suicidal ideation, suicidal intent, or demonstrate suicidal behaviors.    Preferred Language for Healthcare:   [x] English       [] other:    SUBJECTIVE EXAMINATION     Patient stated complaint: Patient reports she is changing meds from Meloxicam to Diclofenac.          Test used Initial score  2024   Pain Summary VAS 3-7/10 3-10   Functional questionnaire Neck Disability Index 42% deficit    Other:              OBJECTIVE EXAMINATION     24  ROM/Strength:

## 2024-08-08 ENCOUNTER — HOSPITAL ENCOUNTER (OUTPATIENT)
Dept: PHYSICAL THERAPY | Age: 44
Setting detail: THERAPIES SERIES
Discharge: HOME | End: 2024-08-08
Payer: COMMERCIAL

## 2024-08-08 PROCEDURE — 97140 MANUAL THERAPY 1/> REGIONS: CPT | Performed by: PHYSICAL THERAPIST

## 2024-08-08 PROCEDURE — 97110 THERAPEUTIC EXERCISES: CPT | Performed by: PHYSICAL THERAPIST

## 2024-08-08 NOTE — FLOWSHEET NOTE
Progressing: [] Met: [] Not Met: [] Adjusted    Therapist goals for Patient:   Short Term Goals: To be achieved in: 2 weeks  1. Independent in HEP and progression per patient tolerance, in order to prevent re-injury.   [] Progressing: [] Met: [] Not Met: [] Adjusted  2. Patient will have a decrease in pain to <1/10 to facilitate improvement in movement, function, and ADLs as indicated by Functional Deficits.  [] Progressing: [] Met: [] Not Met: [] Adjusted    Long Term Goals: To be achieved in: 12 weeks  1. Disability index score of 10% or less for the Neck Disability Index to assist with reaching prior level of function.  [] Progressing: [] Met: [] Not Met: [] Adjusted  2. Patient will demonstrate increased AROM of neck to WNL without pain to allow for proper joint functioning to enable patient to return to driving without ? pain.   [] Progressing: [] Met: [] Not Met: [] Adjusted  3. Patient will demonstrate increased Strength of postural muscles to at least 5/5 throughout without pain to allow for proper functional mobility to enable patient to return to lifting groceries without ? pain.   [] Progressing: [] Met: [] Not Met: [] Adjusted  4. Patient will return to ADL without increased symptoms or restriction.   [] Progressing: [] Met: [] Not Met: [] Adjusted  5. Pt will exhibit self posture correction in clinic for at least 2 consecutive sessions.   [] Progressing: [] Met: [] Not Met: [] Adjusted      Overall Progression Towards Functional goals/ Treatment Progress Update:  [] Patient is progressing as expected towards functional goals listed.    [] Progression is slowed due to complexities/Impairments listed.  [] Progression has been slowed due to co-morbidities.  [x] Plan just implemented, too soon (<30days) to assess goals progression   [] Goals require adjustment due to lack of progress  [] Patient is not progressing as expected and requires additional follow up with physician  [] Other:     TREATMENT PLAN

## 2024-08-13 ENCOUNTER — OFFICE VISIT (OUTPATIENT)
Dept: PRIMARY CARE CLINIC | Age: 44
End: 2024-08-13
Payer: COMMERCIAL

## 2024-08-13 ENCOUNTER — HOSPITAL ENCOUNTER (OUTPATIENT)
Dept: PHYSICAL THERAPY | Age: 44
Setting detail: THERAPIES SERIES
Discharge: HOME | End: 2024-08-13
Payer: COMMERCIAL

## 2024-08-13 VITALS
SYSTOLIC BLOOD PRESSURE: 110 MMHG | OXYGEN SATURATION: 98 % | DIASTOLIC BLOOD PRESSURE: 74 MMHG | WEIGHT: 141.4 LBS | BODY MASS INDEX: 25.05 KG/M2 | HEART RATE: 79 BPM | HEIGHT: 63 IN

## 2024-08-13 DIAGNOSIS — T75.3XXS MOTION SICKNESS, SEQUELA: Primary | ICD-10-CM

## 2024-08-13 PROCEDURE — 97140 MANUAL THERAPY 1/> REGIONS: CPT | Performed by: PHYSICAL THERAPIST

## 2024-08-13 PROCEDURE — 97112 NEUROMUSCULAR REEDUCATION: CPT | Performed by: PHYSICAL THERAPIST

## 2024-08-13 PROCEDURE — 99213 OFFICE O/P EST LOW 20 MIN: CPT | Performed by: FAMILY MEDICINE

## 2024-08-13 PROCEDURE — 97110 THERAPEUTIC EXERCISES: CPT | Performed by: PHYSICAL THERAPIST

## 2024-08-13 PROCEDURE — 20560 NDL INSJ W/O NJX 1 OR 2 MUSC: CPT | Performed by: PHYSICAL THERAPIST

## 2024-08-13 RX ORDER — SCOLOPAMINE TRANSDERMAL SYSTEM 1 MG/1
1 PATCH, EXTENDED RELEASE TRANSDERMAL
Qty: 7 PATCH | Refills: 1 | Status: SHIPPED | OUTPATIENT
Start: 2024-08-13

## 2024-08-13 ASSESSMENT — ENCOUNTER SYMPTOMS
EYES NEGATIVE: 1
RESPIRATORY NEGATIVE: 1
GASTROINTESTINAL NEGATIVE: 1

## 2024-08-13 NOTE — PROGRESS NOTES
promethazine (PHENERGAN) 12.5 MG tablet Take 1 tablet by mouth every 6 hours as needed for Nausea      ondansetron (ZOFRAN) 4 MG tablet Take 1 tablet by mouth every 8 hours as needed for Nausea 20 tablet 0    metoclopramide (REGLAN) 10 MG tablet Take 1 tablet by mouth 3 times daily (with meals) 10 tablet 0    hyoscyamine (LEVBID) 375 MCG extended release tablet Take 1 tablet by mouth every 12 hours as needed for Cramping 30 tablet 0    Magnesium 400 MG TABS Take 400 mg by mouth daily At night      Atogepant (QULIPTA) 60 MG TABS Take 60 mg by mouth daily 30 tablet 5    dicyclomine (BENTYL) 20 MG tablet Take 1 tablet by mouth 4 times daily for 20 doses (Patient not taking: Reported on 8/13/2024) 40 tablet 1    pantoprazole (PROTONIX) 40 MG tablet Take 1 tablet by mouth 2 times daily (before meals) (Patient not taking: Reported on 8/13/2024) 90 tablet 1     No current facility-administered medications for this visit.     Allergies   Allergen Reactions    Ultram [Tramadol Hcl] Hives     Hives    Vicodin [Hydrocodone-Acetaminophen] Nausea And Vomiting       Review of Systems   Constitutional: Negative.    HENT: Negative.     Eyes: Negative.    Respiratory: Negative.     Cardiovascular: Negative.    Gastrointestinal: Negative.    Neurological:  Positive for dizziness.   All other systems reviewed and are negative.      OBJECTIVE:  /74   Pulse 79   Ht 1.6 m (5' 3\")   Wt 64.1 kg (141 lb 6.4 oz)   SpO2 98%   BMI 25.05 kg/m²     Physical Exam  Vitals reviewed.   Eyes:      General: No scleral icterus.     Conjunctiva/sclera: Conjunctivae normal.   Neck:      Thyroid: No thyromegaly.      Vascular: No JVD.   Cardiovascular:      Rate and Rhythm: Normal rate and regular rhythm.      Heart sounds: Normal heart sounds. No murmur heard.     No friction rub. No gallop.   Pulmonary:      Effort: Pulmonary effort is normal.      Breath sounds: Normal breath sounds. No wheezing or rales.   Abdominal:      General: Bowel

## 2024-08-13 NOTE — FLOWSHEET NOTE
[facility]- Outpatient Rehabilitation and Therapy [address], [place] [address] office: [phone] fax: [phone]       Physical Therapy: TREATMENT/PROGRESS NOTE   Patient: Danette Clark (44 y.o. female)   Examination Date: 2024   :  1980 MRN: [id number]   Visit #: 4   Insurance Allowable Auth Needed   20 [x]Yes    []No    Insurance: Payor: [facility] / Plan: [employer] - [place] PPO / Product Type: *No Product type* /   Insurance ID: [id number] - ([facility])  Secondary Insurance (if applicable):    Treatment Diagnosis:     ICD-10-CM    1. Neck pain  M54.2          Medical Diagnosis:  Chronic neck pain [M54.2, G89.29]  Cervical myofascial pain syndrome [M79.18]  DDD (degenerative disc disease), cervical [M50.30]   Referring Physician: Alise Mccormick,*  PCP: Rojelio Lee MD     Plan of care signed (Y/N):     Date of Patient follow up with Physician:      Plan of Care Report: NO  POC update due: (10 visits /OR AUTH LIMITS, whichever is less)  2024                                             Medical History:  Comorbidities:  Osteoarthritis  Anxiety  Depression  COVID-19  Relevant Medical History:                                          Precautions/ Contra-indications:           Latex allergy:  NO  Pacemaker:    NO  Contraindications for Manipulation: None  Date of Surgery:   Other:    Red Flags:  None    Suicide Screening:   The patient did not verbalize a primary behavioral concern, suicidal ideation, suicidal intent, or demonstrate suicidal behaviors.    Preferred Language for Healthcare:   [x] English       [] other:    SUBJECTIVE EXAMINATION     Auth 6 visits -    Patient stated complaint: Patient is doing well. No issues with her home program.        Test used Initial score  2024   Pain Summary VAS 3-7/10 2-3/10   Functional questionnaire Neck Disability Index 42% deficit    Other:              OBJECTIVE EXAMINATION

## 2024-08-16 ENCOUNTER — HOSPITAL ENCOUNTER (OUTPATIENT)
Dept: PHYSICAL THERAPY | Age: 44
Setting detail: THERAPIES SERIES
Discharge: HOME | End: 2024-08-16
Payer: COMMERCIAL

## 2024-08-16 PROCEDURE — 97110 THERAPEUTIC EXERCISES: CPT | Performed by: PHYSICAL THERAPIST

## 2024-08-16 PROCEDURE — 97140 MANUAL THERAPY 1/> REGIONS: CPT | Performed by: PHYSICAL THERAPIST

## 2024-08-16 NOTE — FLOWSHEET NOTE
[facility]- Outpatient Rehabilitation and Therapy [address], [place] [address] office: [phone] fax: [phone]       Physical Therapy: TREATMENT/PROGRESS NOTE   Patient: Danette Clark (44 y.o. female)   Examination Date: 2024   :  1980 MRN: [id number]   Visit #: 5   Insurance Allowable Auth Needed   20 [x]Yes    []No    Insurance: Payor: [facility] / Plan: [employer]BS - [place] PPO / Product Type: *No Product type* /   Insurance ID: [id number] - ([facility])  Secondary Insurance (if applicable):    Treatment Diagnosis:     ICD-10-CM    1. Neck pain  M54.2          Medical Diagnosis:  Chronic neck pain [M54.2, G89.29]  Cervical myofascial pain syndrome [M79.18]  DDD (degenerative disc disease), cervical [M50.30]   Referring Physician: Alise Mccormick,*  PCP: Rojelio Lee MD     Plan of care signed (Y/N):     Date of Patient follow up with Physician:      Plan of Care Report: NO  POC update due: (10 visits /OR AUTH LIMITS, whichever is less)  2024                                             Medical History:  Comorbidities:  Osteoarthritis  Anxiety  Depression  COVID-19  Relevant Medical History:                                          Precautions/ Contra-indications:           Latex allergy:  NO  Pacemaker:    NO  Contraindications for Manipulation: None  Date of Surgery:   Other:    Red Flags:  None    Suicide Screening:   The patient did not verbalize a primary behavioral concern, suicidal ideation, suicidal intent, or demonstrate suicidal behaviors.    Preferred Language for Healthcare:   [x] English       [] other:    SUBJECTIVE EXAMINATION     Auth 6 visits -    Patient stated complaint: Patient states she is having more \"spasm\" like feelings in her shoulders and that is keeping her up at night.  She does notice a little improvement with the exercises.        Test used Initial score  2024   Pain Summary VAS 3-7/10 2-3/10

## 2024-08-19 ENCOUNTER — HOSPITAL ENCOUNTER (OUTPATIENT)
Dept: PHYSICAL THERAPY | Age: 44
Setting detail: THERAPIES SERIES
Discharge: HOME | End: 2024-08-19
Payer: COMMERCIAL

## 2024-08-19 PROCEDURE — 97140 MANUAL THERAPY 1/> REGIONS: CPT | Performed by: PHYSICAL THERAPIST

## 2024-08-19 PROCEDURE — 97110 THERAPEUTIC EXERCISES: CPT | Performed by: PHYSICAL THERAPIST

## 2024-08-19 PROCEDURE — 20560 NDL INSJ W/O NJX 1 OR 2 MUSC: CPT

## 2024-08-19 NOTE — FLOWSHEET NOTE
[facility]- Outpatient Rehabilitation and Therapy [address], [place] [address] office: [phone] fax: [phone]       Physical Therapy: TREATMENT/PROGRESS NOTE   Patient: Danette Clark (44 y.o. female)   Examination Date: 2024   :  1980 MRN: [id number]   Visit #: 6   Insurance Allowable Auth Needed   20 [x]Yes    []No    Insurance: Payor: [facility] / Plan: [facility] - [place] PPO / Product Type: *No Product type* /   Insurance ID: [id number] - ([facility])  Secondary Insurance (if applicable):    Treatment Diagnosis:     ICD-10-CM    1. Neck pain  M54.2          Medical Diagnosis:  Chronic neck pain [M54.2, G89.29]  Cervical myofascial pain syndrome [M79.18]  DDD (degenerative disc disease), cervical [M50.30]   Referring Physician: Alise Mccormick,*  PCP: Rojelio Lee MD     Plan of care signed (Y/N):     Date of Patient follow up with Physician:      Plan of Care Report: NO  POC update due: (10 visits /OR AUTH LIMITS, whichever is less)  2024                                             Medical History:  Comorbidities:  Osteoarthritis  Anxiety  Depression  COVID-19  Relevant Medical History:                                          Precautions/ Contra-indications:           Latex allergy:  NO  Pacemaker:    NO  Contraindications for Manipulation: None  Date of Surgery:   Other:    Red Flags:  None    Suicide Screening:   The patient did not verbalize a primary behavioral concern, suicidal ideation, suicidal intent, or demonstrate suicidal behaviors.    Preferred Language for Healthcare:   [x] English       [] other:    SUBJECTIVE EXAMINATION     Auth 6 visits -   SEND REQUEST AT NEXT VISIT ON     Patient stated complaint: Patient has no new issues to report        Test used Initial score  2024   Pain Summary VAS 3-7/10 2-3/10   Functional questionnaire Neck Disability Index 42% deficit    Other:              OBJECTIVE EXAMINATION

## 2024-08-21 ENCOUNTER — HOSPITAL ENCOUNTER (OUTPATIENT)
Dept: PHYSICAL THERAPY | Age: 44
Setting detail: THERAPIES SERIES
Discharge: HOME | End: 2024-08-21
Payer: COMMERCIAL

## 2024-08-21 PROCEDURE — 97140 MANUAL THERAPY 1/> REGIONS: CPT | Performed by: PHYSICAL THERAPIST

## 2024-08-21 PROCEDURE — 97110 THERAPEUTIC EXERCISES: CPT | Performed by: PHYSICAL THERAPIST

## 2024-08-21 NOTE — PLAN OF CARE
[facility] [address], [place] [address] office: [phone] fax: [phone]       Physical Therapy Re-Certification Plan of Care    Dear Alise Mccormick,*  ,    We had the pleasure of treating the following patient for physical therapy services at [facility]. A summary of our findings can be found in the updated assessment below.  This includes our plan of care.  If you have any questions or concerns regarding these findings, please do not hesitate to contact me at the office phone number checked above.  Thank you for the referral.     Physician Signature:________________________________Date:__________________  By signing above (or electronic signature), therapist's plan is approved by physician      Functional Outcome: NDI 32% deficit   Danette Clark 1980 continues to present with functional deficits in ROM, strength symmetry, and endurance of strength  limiting ability with reaching overhead, carrying items, and lifting items .  During therapy this date, patient required verbal cueing, tactile cueing, and manual interventions for exercise progression, modulating pain, promoting relaxation, and improving soft tissue extensibility. Patient will continue to benefit from ongoing evaluation and advanced clinical decision from a Physical Therapist to improve ROM, muscle strength, endurance, and proper body mechanics to safely return to OF without symptoms or restrictions.    Overall Response to Treatment:  Patient is responding well to treatment and improvement is noted with regards to goals    Total Visits: 7     Recommendation:    [x] Continue PT 1-2x / wk for 4 weeks.   [] Hold PT, pending MD visit   [] Discharge to [facility]. Follow up with PT or MD PRN.       Physical Therapy: TREATMENT/PROGRESS NOTE   Patient: Danette Clark (44 y.o. female)   Examination Date: 2024   :  1980 MRN:

## 2024-08-22 ENCOUNTER — APPOINTMENT (OUTPATIENT)
Dept: PHYSICAL THERAPY | Age: 44
End: 2024-08-22
Payer: COMMERCIAL

## 2024-08-27 ENCOUNTER — APPOINTMENT (OUTPATIENT)
Dept: PHYSICAL THERAPY | Age: 44
End: 2024-08-27
Payer: COMMERCIAL

## 2024-08-27 ENCOUNTER — TELEPHONE (OUTPATIENT)
Dept: ORTHOPEDIC SURGERY | Age: 44
End: 2024-08-27

## 2024-08-27 ENCOUNTER — OFFICE VISIT (OUTPATIENT)
Dept: ORTHOPEDIC SURGERY | Age: 44
End: 2024-08-27
Payer: COMMERCIAL

## 2024-08-27 VITALS — HEIGHT: 63 IN | WEIGHT: 141 LBS | BODY MASS INDEX: 24.98 KG/M2

## 2024-08-27 DIAGNOSIS — M54.2 CHRONIC NECK PAIN: Primary | ICD-10-CM

## 2024-08-27 DIAGNOSIS — G89.29 CHRONIC NECK PAIN: Primary | ICD-10-CM

## 2024-08-27 DIAGNOSIS — M50.30 DDD (DEGENERATIVE DISC DISEASE), CERVICAL: ICD-10-CM

## 2024-08-27 DIAGNOSIS — M79.18 CERVICAL MYOFASCIAL PAIN SYNDROME: ICD-10-CM

## 2024-08-27 PROCEDURE — 99214 OFFICE O/P EST MOD 30 MIN: CPT | Performed by: PHYSICIAN ASSISTANT

## 2024-08-27 NOTE — TELEPHONE ENCOUNTER
S/W the patient regarding referral for BRYSON. I informed the patient that per Alise Mccormick PA-C, she spoke with radiology at [facility] and they do offer IV sedation for ESIs at [facility] or [facility].. Patient was informed referral was placed and will be faxed to [facility]. Patient was informed they will reach out to her to schedule the patient for procedure. She was instructed to schedule BRYSON F/U APPT 2 weeks after the procedure. She voiced understanding.

## 2024-08-27 NOTE — PROGRESS NOTES
never smoked. She has never been exposed to tobacco smoke. She has never used smokeless tobacco. She reports that she does not drink alcohol and does not use drugs.  Family History:   Family History   Problem Relation Age of Onset    Hypertension Mother     Mental Retardation Father         bipolar    Arthritis Father     Depression Father     Mental Illness Father         bipolar    Substance Abuse Father         alcoholic    Mental Retardation Sister         bipolar    Depression Sister     Mental Illness Sister         bipolar    Arthritis Maternal Grandmother     Arthritis Paternal Grandmother     Mental Illness Maternal Aunt         schizophrenia       REVIEW OF SYSTEMS: Full ROS reviewed & scanned into chart  CONSTITUTIONAL: Denies unexplained fevers; does admit 30 pound weight loss which she states that she has had a full evaluation with PCP and GI.  Favored to be due to Adderall       PHYSICAL EXAM:    Vitals: Height 1.6 m (5' 3\"), weight 64 kg (141 lb), not currently breastfeeding.  Pain score 5/10    GENERAL EXAM:  General Apparence: Patient is adequately groomed with no evidence of malnutrition.  Orientation: The patient is oriented to time, place and person.   Mood & Affect:The patient's mood and affect are appropriate   Vascular: Examination reveals no swelling tenderness in upper or lower extremities. Good capillary refill  Lymphatic: The lymphatic examination bilaterally reveals all areas to be without enlargement or induration  Sensation: Sensation is intact without deficit  Coordination/Balance: Good coordination     CERVICAL EXAMINATION:  Inspection: Local inspection shows no step-off or bruising.  Cervical alignment is normal.     Palpation: No focal tenderness at midline.  Bilateral trap tightness  Range of Motion: Intact flexion extension and lateral rotation with mild-mod loss lateral rotation babar to the right   Strength: 5/5 bilateral upper extremities   Special Tests:      Spurling's,

## 2024-08-28 ENCOUNTER — APPOINTMENT (OUTPATIENT)
Dept: PHYSICAL THERAPY | Age: 44
End: 2024-08-28
Payer: COMMERCIAL

## 2024-08-30 ENCOUNTER — APPOINTMENT (OUTPATIENT)
Dept: PHYSICAL THERAPY | Age: 44
End: 2024-08-30
Payer: COMMERCIAL

## 2024-10-18 ENCOUNTER — TELEPHONE (OUTPATIENT)
Dept: ORTHOPEDIC SURGERY | Age: 44
End: 2024-10-18

## 2024-10-18 DIAGNOSIS — M79.18 CERVICAL MYOFASCIAL PAIN SYNDROME: ICD-10-CM

## 2024-10-18 DIAGNOSIS — G89.29 CHRONIC NECK PAIN: ICD-10-CM

## 2024-10-18 DIAGNOSIS — M54.2 CHRONIC NECK PAIN: ICD-10-CM

## 2024-10-18 DIAGNOSIS — M50.30 DDD (DEGENERATIVE DISC DISEASE), CERVICAL: ICD-10-CM

## 2024-10-18 RX ORDER — DICLOFENAC SODIUM 75 MG/1
75 TABLET, DELAYED RELEASE ORAL 2 TIMES DAILY PRN
Qty: 60 TABLET | Refills: 0 | Status: CANCELLED | OUTPATIENT
Start: 2024-10-18

## 2024-10-21 DIAGNOSIS — M79.18 CERVICAL MYOFASCIAL PAIN SYNDROME: ICD-10-CM

## 2024-10-21 DIAGNOSIS — G89.29 CHRONIC NECK PAIN: Primary | ICD-10-CM

## 2024-10-21 DIAGNOSIS — M54.2 CHRONIC NECK PAIN: Primary | ICD-10-CM

## 2024-10-21 DIAGNOSIS — M50.30 DDD (DEGENERATIVE DISC DISEASE), CERVICAL: ICD-10-CM

## 2024-10-21 RX ORDER — CYCLOBENZAPRINE HCL 10 MG
10 TABLET ORAL NIGHTLY PRN
Qty: 15 TABLET | Refills: 0 | Status: SHIPPED | OUTPATIENT
Start: 2024-10-21 | End: 2024-11-05

## 2024-10-21 NOTE — TELEPHONE ENCOUNTER
S/w the patient regarding her message. She was informed her medication refill was sent over to her pharmacy and is aware this is for short term use. She was informed if she would like to continue on the medication a referral to pain management would need placed. She voiced understanding.

## 2024-10-21 NOTE — TELEPHONE ENCOUNTER
Patient last seen 2024 and medication last filled 2024:         Impression:  1) Chronic worsening neck/trap pain, right cervical radiculitis   2) C6-7 DDD with mild disc bulging and anterior listhesis  3) Chronic cervical myofascial pain   4) H/o remote BRYSON w/temp relief  5) H/o Bipolar d/o, Anx/dep, ADHD        Plan:   1) We had a long discussion.  We discussed treatment options in detail.  She has elected to pursue treatment course includin) Right C6-7 BRYSON #1--will request eval for light oral or IV sedation.  Procedure risk and benefits were discussed and understood.  Pamphlet provided for more information.  Will order with G[facility]Y at [facility] or [facility].    3) Con't Diclofenac 75mg I po BID PRN; informed to d/c other NSAIDs during. Take with food.  Side effect/risk discussed.  GI precaution. Use sparingly.  4) Discussed concerning s/sx  6) F/u 2wks after GLENDA               Alise Mccormick PA-C, MPAS  Board Certified by the [facility]

## 2024-10-21 NOTE — TELEPHONE ENCOUNTER
Prescription Refill     Medication Name:  YES FLEXERIL IS FINE   Pharmacy: [id number] - [place], [place] - [address] [place] [address] - P [phone] - F [phone]      Patient Contact Number:  [phone]

## 2024-10-21 NOTE — TELEPHONE ENCOUNTER
Contacted the patient & left a voicemail regarding her request for a muscle relaxer, patient was asked if flexeril was beneficial in the past for her, she was informed we can prescribe this medication, but it would be only for a short term. She was instructed to call back at her convenience or to send a [employer] message if that was easier for her.

## 2024-10-21 NOTE — TELEPHONE ENCOUNTER
Patient called back & states Flexeril worked fine for her & she would like to proceed with a refill.

## 2024-11-07 ENCOUNTER — OFFICE VISIT (OUTPATIENT)
Dept: PRIMARY CARE CLINIC | Age: 44
End: 2024-11-07
Payer: COMMERCIAL

## 2024-11-07 ENCOUNTER — PATIENT MESSAGE (OUTPATIENT)
Dept: PRIMARY CARE CLINIC | Age: 44
End: 2024-11-07

## 2024-11-07 VITALS
DIASTOLIC BLOOD PRESSURE: 80 MMHG | HEIGHT: 63 IN | WEIGHT: 144.6 LBS | BODY MASS INDEX: 25.62 KG/M2 | SYSTOLIC BLOOD PRESSURE: 122 MMHG | OXYGEN SATURATION: 97 % | HEART RATE: 78 BPM

## 2024-11-07 DIAGNOSIS — M54.2 NECK PAIN: Primary | ICD-10-CM

## 2024-11-07 PROCEDURE — 99214 OFFICE O/P EST MOD 30 MIN: CPT | Performed by: FAMILY MEDICINE

## 2024-11-07 RX ORDER — METHYLPREDNISOLONE 4 MG/1
TABLET ORAL
Qty: 1 KIT | Refills: 0 | Status: SHIPPED | OUTPATIENT
Start: 2024-11-07

## 2024-11-07 SDOH — ECONOMIC STABILITY: FOOD INSECURITY: WITHIN THE PAST 12 MONTHS, THE FOOD YOU BOUGHT JUST DIDN'T LAST AND YOU DIDN'T HAVE MONEY TO GET MORE.: NEVER TRUE

## 2024-11-07 SDOH — ECONOMIC STABILITY: INCOME INSECURITY: HOW HARD IS IT FOR YOU TO PAY FOR THE VERY BASICS LIKE FOOD, HOUSING, MEDICAL CARE, AND HEATING?: NOT HARD AT ALL

## 2024-11-07 SDOH — ECONOMIC STABILITY: FOOD INSECURITY: WITHIN THE PAST 12 MONTHS, YOU WORRIED THAT YOUR FOOD WOULD RUN OUT BEFORE YOU GOT MONEY TO BUY MORE.: NEVER TRUE

## 2024-11-07 ASSESSMENT — ENCOUNTER SYMPTOMS
RESPIRATORY NEGATIVE: 1
GASTROINTESTINAL NEGATIVE: 1
EYES NEGATIVE: 1

## 2024-11-07 NOTE — PROGRESS NOTES
SUBJECTIVE:  Patient ID: Danette Clark is a 44 y.o. y.o. female     HPI   Neck Pain: Paitent complains of neck pain. Event that precipitate these symptoms: none known. Onset of symptoms several months ago, gradually worsening since that time. Current symptoms are numbness in UE, pain in neck (aching and tingling in character; 6/10 in severity), and paresthesias in in the spine . Patient denies weakness in UE . Patient has had recurrent self limited episodes of neck pain in the past.  Previous treatments include: physical therapy and see by ortho  .  Patient had MRIs already been seen by Ortho home she had physical therapy not getting much better  She already called to make appoint with [facility]  She has been experiencing a lot of tingling numbness according to the in the arms and also in the paraspinal area on and off  She is going through some stress at home  Past Medical History:   Diagnosis Date    ADHD (attention deficit hyperactivity disorder) 2023    Anxiety     Arthritis     Bipolar affective disorder (HCC)     Borderline personality disorder (HCC)     Depression     bipolar disorder    Headache     IBS (irritable bowel syndrome)     Neck pain     Post hysterectomy menopause syndrome       Past Surgical History:   Procedure Laterality Date    ACHILLES TENDON SURGERY Right 11/01/2023    APPENDECTOMY  2007    CHOLECYSTECTOMY  2007    HYSTERECTOMY, VAGINAL      KNEE ARTHROSCOPY Right 2010    torn meniscus    KNEE SURGERY Right 2005    Knee cap    OVARY REMOVAL Right     PCOS    SINUS SURGERY  2000    YENNIFER AND BSO (CERVIX REMOVED)  2008    ovarian cyst, adenomyosis- benign    TUBAL LIGATION  2005    TYMPANOSTOMY TUBE PLACEMENT Bilateral     x 3      Family History   Problem Relation Age of Onset    Hypertension Mother     Mental Retardation Father         bipolar    Arthritis Father     Depression Father     Mental Illness Father         bipolar    Substance Abuse Father         alcoholic    Mental

## 2024-11-13 ENCOUNTER — OFFICE VISIT (OUTPATIENT)
Dept: PRIMARY CARE CLINIC | Age: 44
End: 2024-11-13
Payer: COMMERCIAL

## 2024-11-13 VITALS
TEMPERATURE: 97.8 F | WEIGHT: 144 LBS | BODY MASS INDEX: 25.51 KG/M2 | SYSTOLIC BLOOD PRESSURE: 124 MMHG | DIASTOLIC BLOOD PRESSURE: 86 MMHG | RESPIRATION RATE: 16 BRPM | HEART RATE: 83 BPM | OXYGEN SATURATION: 99 %

## 2024-11-13 DIAGNOSIS — M54.2 NECK PAIN: ICD-10-CM

## 2024-11-13 DIAGNOSIS — M54.12 CERVICAL RADICULOPATHY: Primary | ICD-10-CM

## 2024-11-13 PROCEDURE — 99213 OFFICE O/P EST LOW 20 MIN: CPT | Performed by: NURSE PRACTITIONER

## 2024-11-13 RX ORDER — GABAPENTIN 300 MG/1
300 CAPSULE ORAL 2 TIMES DAILY
Qty: 60 CAPSULE | Refills: 0 | Status: SHIPPED | OUTPATIENT
Start: 2024-11-13 | End: 2024-12-13

## 2024-11-13 ASSESSMENT — ENCOUNTER SYMPTOMS
NAUSEA: 0
COUGH: 0
WHEEZING: 0
BACK PAIN: 0
DIARRHEA: 0
VOMITING: 0
SHORTNESS OF BREATH: 0

## 2024-11-13 NOTE — PROGRESS NOTES
ENCOUNTER DATE: 11/13/2024     NAME: Danette Clark   AGE: 44 y.o.   GENDER: female   YOB: 1980    Chief Complaint   Patient presents with    Follow-up     Pt was in the ED 11/11/24 for Paresthesias       ASSESSMENT/PLAN:  1. Cervical radiculopathy  ED notes reviewed  Previous MRI reviewed  Patient has failed multiple treatments  Trial of gabapentin.  Discussed medication in detail.  Recommend to reschedule appointment with Martín  She will keep appointment as scheduled with neurology in December  - gabapentin (NEURONTIN) 300 MG capsule; Take 1 capsule by mouth 2 times daily for 30 days. Intended supply: 30 days  Dispense: 60 capsule; Refill: 0    2. Neck pain        Return if symptoms worsen or fail to improve.     HPI:   Patient is here for an ED follow up    Seen by PCP 11/7/2024 for neck pain  Prescribed Medrol Dosepak.    Continued on Flexeril per Ortho  Had MRI completed 7/18/2024      Seen in ED 11/11/2024 for neck pain, numbness and tingling times several weeks  Labs were completed.  K+ and Phos slightly low, which were replaced  Advised to follow-up with PCP    Has worsened neck pain and now radiating into mid spine. Feels pins and needles in her upper back and in extremities. Will have shooting pains with certain positions. Arms will get weak at times.   Shoulders spasm.     Had apt with Martín (Dr Royal), patient canceled apt and scheduled with a neurologist at [facility]. Not scheduled until December with neurology.     Has completed medrol pack with no relief.   Taking flexeril. Has tried Ibuprofen 800mg and Tylenol    Has seen orthospine, GLENDA ordered, but has not scheduled yet.   She doesn't want to do the GLENDA without sedation.   Had GLENDA in the past.     Has been in Physical Therapy.   Has tried dry needling, epsom salt baths, massage.   Taking tumeric, vit d, zinc    ROS:  Review of Systems   Constitutional:  Negative for chills, diaphoresis, fatigue and fever.

## 2024-12-01 DIAGNOSIS — G43.819 OTHER MIGRAINE WITHOUT STATUS MIGRAINOSUS, INTRACTABLE: ICD-10-CM

## 2024-12-02 RX ORDER — RIZATRIPTAN BENZOATE 10 MG/1
10 TABLET ORAL
Qty: 12 TABLET | Refills: 2 | Status: SHIPPED | OUTPATIENT
Start: 2024-12-02 | End: 2024-12-02

## 2024-12-04 NOTE — TELEPHONE ENCOUNTER
Medication:   Requested Prescriptions     Pending Prescriptions Disp Refills    QULIPTA 60 MG TABS [Pharmacy Med Name: QULIPTA 60 MG TABLET] 30 tablet 5     Sig: TAKE 1 TABLET BY MOUTH DAILY     Last Filled:  2/27/2024    Last appt: 11/13/2024   Next appt: Visit date not found    Last OARRS:        No data to display

## 2024-12-05 RX ORDER — ATOGEPANT 60 MG/1
1 TABLET ORAL DAILY
Qty: 30 TABLET | Refills: 5 | Status: SHIPPED | OUTPATIENT
Start: 2024-12-05

## 2024-12-06 RX ORDER — ATOGEPANT 60 MG/1
1 TABLET ORAL DAILY
Qty: 30 TABLET | Refills: 5 | OUTPATIENT
Start: 2024-12-06

## 2025-01-10 ENCOUNTER — OFFICE VISIT (OUTPATIENT)
Dept: PRIMARY CARE CLINIC | Age: 45
End: 2025-01-10
Payer: COMMERCIAL

## 2025-01-10 ENCOUNTER — PATIENT MESSAGE (OUTPATIENT)
Dept: PRIMARY CARE CLINIC | Age: 45
End: 2025-01-10

## 2025-01-10 VITALS
BODY MASS INDEX: 25.98 KG/M2 | OXYGEN SATURATION: 98 % | DIASTOLIC BLOOD PRESSURE: 68 MMHG | HEIGHT: 63 IN | HEART RATE: 102 BPM | SYSTOLIC BLOOD PRESSURE: 90 MMHG | WEIGHT: 146.6 LBS

## 2025-01-10 DIAGNOSIS — R20.2 TINGLING: ICD-10-CM

## 2025-01-10 DIAGNOSIS — M79.10 MYALGIA: ICD-10-CM

## 2025-01-10 DIAGNOSIS — M79.10 MYALGIA: Primary | ICD-10-CM

## 2025-01-10 DIAGNOSIS — M50.30 DDD (DEGENERATIVE DISC DISEASE), CERVICAL: ICD-10-CM

## 2025-01-10 PROCEDURE — 99214 OFFICE O/P EST MOD 30 MIN: CPT | Performed by: FAMILY MEDICINE

## 2025-01-10 RX ORDER — ALPRAZOLAM 0.25 MG/1
TABLET ORAL
COMMUNITY
Start: 2024-12-15

## 2025-01-10 RX ORDER — GABAPENTIN 600 MG/1
TABLET ORAL
COMMUNITY
Start: 2024-11-01

## 2025-01-10 RX ORDER — CYCLOBENZAPRINE HCL 5 MG
5 TABLET ORAL NIGHTLY PRN
Qty: 30 TABLET | Refills: 0 | Status: SHIPPED | OUTPATIENT
Start: 2025-01-10 | End: 2025-01-20

## 2025-01-10 SDOH — ECONOMIC STABILITY: FOOD INSECURITY: WITHIN THE PAST 12 MONTHS, YOU WORRIED THAT YOUR FOOD WOULD RUN OUT BEFORE YOU GOT MONEY TO BUY MORE.: NEVER TRUE

## 2025-01-10 SDOH — ECONOMIC STABILITY: FOOD INSECURITY: WITHIN THE PAST 12 MONTHS, THE FOOD YOU BOUGHT JUST DIDN'T LAST AND YOU DIDN'T HAVE MONEY TO GET MORE.: NEVER TRUE

## 2025-01-10 ASSESSMENT — ENCOUNTER SYMPTOMS
EYES NEGATIVE: 1
GASTROINTESTINAL NEGATIVE: 1
RESPIRATORY NEGATIVE: 1

## 2025-01-10 ASSESSMENT — PATIENT HEALTH QUESTIONNAIRE - PHQ9
SUM OF ALL RESPONSES TO PHQ QUESTIONS 1-9: 3
8. MOVING OR SPEAKING SO SLOWLY THAT OTHER PEOPLE COULD HAVE NOTICED. OR THE OPPOSITE, BEING SO FIGETY OR RESTLESS THAT YOU HAVE BEEN MOVING AROUND A LOT MORE THAN USUAL: SEVERAL DAYS
SUM OF ALL RESPONSES TO PHQ QUESTIONS 1-9: 3
4. FEELING TIRED OR HAVING LITTLE ENERGY: SEVERAL DAYS
SUM OF ALL RESPONSES TO PHQ QUESTIONS 1-9: 3
1. LITTLE INTEREST OR PLEASURE IN DOING THINGS: NOT AT ALL
10. IF YOU CHECKED OFF ANY PROBLEMS, HOW DIFFICULT HAVE THESE PROBLEMS MADE IT FOR YOU TO DO YOUR WORK, TAKE CARE OF THINGS AT HOME, OR GET ALONG WITH OTHER PEOPLE: SOMEWHAT DIFFICULT
7. TROUBLE CONCENTRATING ON THINGS, SUCH AS READING THE NEWSPAPER OR WATCHING TELEVISION: NOT AT ALL
3. TROUBLE FALLING OR STAYING ASLEEP: SEVERAL DAYS
9. THOUGHTS THAT YOU WOULD BE BETTER OFF DEAD, OR OF HURTING YOURSELF: NOT AT ALL
SUM OF ALL RESPONSES TO PHQ QUESTIONS 1-9: 3
6. FEELING BAD ABOUT YOURSELF - OR THAT YOU ARE A FAILURE OR HAVE LET YOURSELF OR YOUR FAMILY DOWN: NOT AT ALL
5. POOR APPETITE OR OVEREATING: NOT AT ALL

## 2025-01-10 NOTE — PROGRESS NOTES
SUBJECTIVE:  Patient ID: Danette Clark is a 44 y.o. y.o. female     HPI   Pt is here with concern about bodyache feeling needles type of pain seen by neurologist was started on Neurontin twice a day with the option of increasing 3 times a day if needed lately she has been feeling achy needle pins according to patient and some different part of her body  She was doing okay till last several days  Muscle spasm she is using magnesium and different other supplements to help  She is seeing GYN started her more replacement therapy  Past Medical History:   Diagnosis Date    ADHD (attention deficit hyperactivity disorder) 2023    Anxiety     Arthritis     Bipolar affective disorder (HCC)     Borderline personality disorder (HCC)     Depression     bipolar disorder    Headache     IBS (irritable bowel syndrome)     Neck pain     Post hysterectomy menopause syndrome       Past Surgical History:   Procedure Laterality Date    ACHILLES TENDON SURGERY Right 11/01/2023    APPENDECTOMY  2007    CHOLECYSTECTOMY  2007    HYSTERECTOMY, VAGINAL      KNEE ARTHROSCOPY Right 2010    torn meniscus    KNEE SURGERY Right 2005    Knee cap    OVARY REMOVAL Right     PCOS    SINUS SURGERY  2000    YENNIFER AND BSO (CERVIX REMOVED)  2008    ovarian cyst, adenomyosis- benign    TUBAL LIGATION  2005    TYMPANOSTOMY TUBE PLACEMENT Bilateral     x 3      Family History   Problem Relation Age of Onset    Hypertension Mother     Mental Retardation Father         bipolar    Arthritis Father     Depression Father     Mental Illness Father         bipolar    Substance Abuse Father         alcoholic    Mental Retardation Sister         bipolar    Depression Sister     Mental Illness Sister         bipolar    Arthritis Maternal Grandmother     Arthritis Paternal Grandmother     Mental Illness Maternal Aunt         schizophrenia     Social History     Socioeconomic History    Marital status:      Spouse name: Igor    Number of children: 2    Years of

## 2025-01-11 LAB
ANION GAP SERPL CALCULATED.3IONS-SCNC: 12 MMOL/L (ref 3–16)
BASOPHILS # BLD: 0 K/UL (ref 0–0.2)
BASOPHILS NFR BLD: 0.3 %
BUN SERPL-MCNC: 23 MG/DL (ref 7–20)
CALCIUM SERPL-MCNC: 9.9 MG/DL (ref 8.3–10.6)
CHLORIDE SERPL-SCNC: 103 MMOL/L (ref 99–110)
CK SERPL-CCNC: 71 U/L (ref 26–192)
CO2 SERPL-SCNC: 25 MMOL/L (ref 21–32)
CREAT SERPL-MCNC: 0.7 MG/DL (ref 0.6–1.1)
CRP SERPL-MCNC: <3 MG/L (ref 0–5.1)
DEPRECATED RDW RBC AUTO: 13.5 % (ref 12.4–15.4)
EOSINOPHIL # BLD: 0.1 K/UL (ref 0–0.6)
EOSINOPHIL NFR BLD: 1.1 %
ERYTHROCYTE [SEDIMENTATION RATE] IN BLOOD BY WESTERGREN METHOD: 11 MM/HR (ref 0–20)
GFR SERPLBLD CREATININE-BSD FMLA CKD-EPI: >90 ML/MIN/{1.73_M2}
GLUCOSE SERPL-MCNC: 100 MG/DL (ref 70–99)
HCT VFR BLD AUTO: 44.2 % (ref 36–48)
HGB BLD-MCNC: 15.2 G/DL (ref 12–16)
LYMPHOCYTES # BLD: 2.1 K/UL (ref 1–5.1)
LYMPHOCYTES NFR BLD: 20.5 %
MCH RBC QN AUTO: 29.4 PG (ref 26–34)
MCHC RBC AUTO-ENTMCNC: 34.5 G/DL (ref 31–36)
MCV RBC AUTO: 85.2 FL (ref 80–100)
MONOCYTES # BLD: 0.8 K/UL (ref 0–1.3)
MONOCYTES NFR BLD: 8 %
NEUTROPHILS # BLD: 7.1 K/UL (ref 1.7–7.7)
NEUTROPHILS NFR BLD: 70.1 %
PLATELET # BLD AUTO: 265 K/UL (ref 135–450)
PMV BLD AUTO: 8.3 FL (ref 5–10.5)
POTASSIUM SERPL-SCNC: 4 MMOL/L (ref 3.5–5.1)
RBC # BLD AUTO: 5.18 M/UL (ref 4–5.2)
SODIUM SERPL-SCNC: 140 MMOL/L (ref 136–145)
TSH SERPL DL<=0.005 MIU/L-ACNC: 0.67 UIU/ML (ref 0.27–4.2)
WBC # BLD AUTO: 10.1 K/UL (ref 4–11)

## 2025-04-15 ENCOUNTER — PATIENT MESSAGE (OUTPATIENT)
Dept: PRIMARY CARE CLINIC | Age: 45
End: 2025-04-15

## 2025-04-15 RX ORDER — DICYCLOMINE HCL 20 MG
20 TABLET ORAL 4 TIMES DAILY
Qty: 40 TABLET | Refills: 1 | Status: SHIPPED | OUTPATIENT
Start: 2025-04-15 | End: 2025-04-20

## 2025-04-15 NOTE — TELEPHONE ENCOUNTER
Medication:   Requested Prescriptions     Pending Prescriptions Disp Refills    dicyclomine (BENTYL) 20 MG tablet 40 tablet 1     Sig: Take 1 tablet by mouth 4 times daily for 20 doses     Last Filled:  8.13.24    Last appt: 1/10/2025   Next appt: Visit date not found    Last OARRS:        No data to display

## 2025-04-16 ENCOUNTER — OFFICE VISIT (OUTPATIENT)
Dept: PRIMARY CARE CLINIC | Age: 45
End: 2025-04-16
Payer: COMMERCIAL

## 2025-04-16 VITALS
OXYGEN SATURATION: 97 % | BODY MASS INDEX: 24.98 KG/M2 | RESPIRATION RATE: 16 BRPM | DIASTOLIC BLOOD PRESSURE: 72 MMHG | WEIGHT: 141 LBS | TEMPERATURE: 97.8 F | HEART RATE: 70 BPM | SYSTOLIC BLOOD PRESSURE: 116 MMHG

## 2025-04-16 DIAGNOSIS — R05.1 ACUTE COUGH: ICD-10-CM

## 2025-04-16 DIAGNOSIS — J40 BRONCHITIS: Primary | ICD-10-CM

## 2025-04-16 PROCEDURE — 99213 OFFICE O/P EST LOW 20 MIN: CPT | Performed by: NURSE PRACTITIONER

## 2025-04-16 RX ORDER — CODEINE PHOSPHATE AND GUAIFENESIN 10; 100 MG/5ML; MG/5ML
5 SOLUTION ORAL 3 TIMES DAILY PRN
Qty: 118 ML | Refills: 0 | Status: SHIPPED | OUTPATIENT
Start: 2025-04-16 | End: 2025-04-21

## 2025-04-16 RX ORDER — METHYLPREDNISOLONE 4 MG/1
TABLET ORAL
Qty: 1 KIT | Refills: 0 | Status: SHIPPED | OUTPATIENT
Start: 2025-04-16

## 2025-04-16 ASSESSMENT — ENCOUNTER SYMPTOMS
SORE THROAT: 0
DIARRHEA: 0
COUGH: 1
SHORTNESS OF BREATH: 0
VOMITING: 0
TROUBLE SWALLOWING: 0
VOICE CHANGE: 0
RHINORRHEA: 0
NAUSEA: 0
WHEEZING: 0
SINUS PRESSURE: 0
ABDOMINAL PAIN: 0
CHEST TIGHTNESS: 0
SINUS PAIN: 0

## 2025-04-16 NOTE — PROGRESS NOTES
ENCOUNTER DATE: 4/16/2025     NAME: Danette Clark   AGE: 45 y.o.   GENDER: female   YOB: 1980    Chief Complaint   Patient presents with    Cough     Started 4/14/25       ASSESSMENT/PLAN:  1.  Bronchitis  Declines COVID test  Likely viral.  No need for antibiotics at this time  Recommend to start on steroids.  Has albuterol inhaler at home  Cough syrup as needed.  Advised could be sedating  Advised to call if no improvement or if symptoms worsen  - methylPREDNISolone (MEDROL, ZHENG,) 4 MG tablet; Take by mouth per package instruction  Dispense: 1 kit; Refill: 0  - guaiFENesin-codeine (GUAIFENESIN AC) 100-10 MG/5ML liquid; Take 5 mLs by mouth 3 times daily as needed for Cough for up to 5 days. Max Daily Amount: 15 mLs  Dispense: 118 mL; Refill: 0    2. Acute cough  - guaiFENesin-codeine (GUAIFENESIN AC) 100-10 MG/5ML liquid; Take 5 mLs by mouth 3 times daily as needed for Cough for up to 5 days. Max Daily Amount: 15 mLs  Dispense: 118 mL; Refill: 0      Return if symptoms worsen or fail to improve.     HPI:   Patient is here for a sick visit    Onset: 3 days ago  Cough worsening.   Now barky cough.  Coughing spells  No shortness of breath or wheezing.   No fevers. Has headache from coughing so hard  No nasal congestion/drainage   No post nasal drip.   Coughing so hard, she is pulling muscles   No history of asthma  [place], unsure if that was a trigger  Has albuterol inhaler at home from previous prescription    No known sick contacts.     Taking mucinex DM, Promethazine DM, which is not helping  States she has taken cough syrup with codeine in the past and tolerated well    ROS:  Review of Systems   Constitutional:  Negative for chills, fatigue and fever.   HENT:  Negative for congestion, ear pain, postnasal drip, rhinorrhea, sinus pressure, sinus pain, sore throat, trouble swallowing and voice change.    Respiratory:  Positive for cough. Negative for chest tightness,

## 2025-04-22 ENCOUNTER — PATIENT MESSAGE (OUTPATIENT)
Dept: PRIMARY CARE CLINIC | Age: 45
End: 2025-04-22

## 2025-04-22 ENCOUNTER — TELEPHONE (OUTPATIENT)
Dept: PRIMARY CARE CLINIC | Age: 45
End: 2025-04-22

## 2025-04-22 DIAGNOSIS — R05.1 ACUTE COUGH: ICD-10-CM

## 2025-04-22 DIAGNOSIS — J06.9 UPPER RESPIRATORY TRACT INFECTION, UNSPECIFIED TYPE: Primary | ICD-10-CM

## 2025-04-22 RX ORDER — AMOXICILLIN 875 MG/1
875 TABLET, COATED ORAL 2 TIMES DAILY
Qty: 20 TABLET | Refills: 0 | Status: SHIPPED | OUTPATIENT
Start: 2025-04-22 | End: 2025-05-02

## 2025-04-22 NOTE — TELEPHONE ENCOUNTER
Medication Requested for Continuing Symptoms  Acute Visit - 4/16/2025  Patient requesting medication  *Patient I have taken the steroids but still have a cough and now nasal congestion with thick yellow mucus.     Please follow up with patient: [phone]

## 2025-04-24 ENCOUNTER — HOSPITAL ENCOUNTER (OUTPATIENT)
Dept: GENERAL RADIOLOGY | Age: 45
Discharge: HOME | End: 2025-04-24
Payer: COMMERCIAL

## 2025-04-24 ENCOUNTER — TELEPHONE (OUTPATIENT)
Dept: ADMINISTRATIVE | Age: 45
End: 2025-04-24

## 2025-04-24 DIAGNOSIS — R05.1 ACUTE COUGH: ICD-10-CM

## 2025-04-24 PROCEDURE — 71046 X-RAY EXAM CHEST 2 VIEWS: CPT

## 2025-04-24 NOTE — TELEPHONE ENCOUNTER
Submitted PA for Qulipta 60MG tablets  [facility]ia [id number]  STATUS: PENDING.    Follow up done daily; if no decision with in three days we will refax.  If another three days goes by with no decision will call the insurance for status.

## 2025-04-25 ENCOUNTER — RESULTS FOLLOW-UP (OUTPATIENT)
Dept: PRIMARY CARE CLINIC | Age: 45
End: 2025-04-25

## 2025-04-27 ENCOUNTER — APPOINTMENT (OUTPATIENT)
Age: 45
End: 2025-04-27
Payer: COMMERCIAL

## 2025-04-27 ENCOUNTER — HOSPITAL ENCOUNTER (EMERGENCY)
Age: 45
Discharge: HOME | End: 2025-04-27
Attending: EMERGENCY MEDICINE
Payer: COMMERCIAL

## 2025-04-27 VITALS
BODY MASS INDEX: 25.52 KG/M2 | HEART RATE: 72 BPM | WEIGHT: 144 LBS | DIASTOLIC BLOOD PRESSURE: 84 MMHG | OXYGEN SATURATION: 98 % | HEIGHT: 63 IN | SYSTOLIC BLOOD PRESSURE: 123 MMHG | TEMPERATURE: 97.8 F | RESPIRATION RATE: 18 BRPM

## 2025-04-27 DIAGNOSIS — R74.8 ELEVATED LIPASE: ICD-10-CM

## 2025-04-27 DIAGNOSIS — R10.9 LEFT FLANK PAIN: Primary | ICD-10-CM

## 2025-04-27 LAB
ALBUMIN SERPL-MCNC: 4.6 G/DL (ref 3.4–5)
ALBUMIN/GLOB SERPL: 1.7 {RATIO}
ALP SERPL-CCNC: 119 U/L (ref 40–129)
ALT SERPL-CCNC: 20 U/L (ref 10–40)
ANION GAP SERPL CALCULATED.3IONS-SCNC: 13 MMOL/L (ref 3–16)
AST SERPL-CCNC: 26 U/L (ref 15–37)
BASOPHILS # BLD: 0.03 K/UL (ref 0–0.2)
BASOPHILS NFR BLD: 0 %
BILIRUB SERPL-MCNC: 0.3 MG/DL (ref 0–1)
BILIRUB UR QL STRIP: NEGATIVE
BUN SERPL-MCNC: 11 MG/DL (ref 7–20)
CALCIUM SERPL-MCNC: 9.2 MG/DL (ref 8.3–10.6)
CHLORIDE SERPL-SCNC: 104 MMOL/L (ref 99–110)
CLARITY UR: CLEAR
CO2 SERPL-SCNC: 24 MMOL/L (ref 21–32)
COLOR UR: YELLOW
COMMENT: NORMAL
CREAT SERPL-MCNC: 0.8 MG/DL (ref 0.5–1)
EOSINOPHIL # BLD: 0.18 K/UL (ref 0–0.6)
EOSINOPHILS RELATIVE PERCENT: 2 %
ERYTHROCYTE [DISTWIDTH] IN BLOOD BY AUTOMATED COUNT: 12.7 % (ref 12.4–15.4)
GFR, ESTIMATED: >90 ML/MIN/1.73M2
GLUCOSE SERPL-MCNC: 86 MG/DL (ref 70–99)
GLUCOSE UR STRIP-MCNC: NEGATIVE MG/DL
HCT VFR BLD AUTO: 43.6 % (ref 36–48)
HGB BLD-MCNC: 14.9 G/DL (ref 12–16)
HGB UR QL STRIP.AUTO: NEGATIVE
IMM GRANULOCYTES # BLD AUTO: 0.08 K/UL (ref 0–0.5)
IMM GRANULOCYTES NFR BLD: 1 %
KETONES UR STRIP-MCNC: NEGATIVE MG/DL
LEUKOCYTE ESTERASE UR QL STRIP: NEGATIVE
LIPASE SERPL-CCNC: 76 U/L (ref 13–60)
LYMPHOCYTES NFR BLD: 2.26 K/UL (ref 1–5.1)
LYMPHOCYTES RELATIVE PERCENT: 27 %
MAGNESIUM SERPL-MCNC: 2.3 MG/DL (ref 1.8–2.4)
MCH RBC QN AUTO: 29.2 PG (ref 26–34)
MCHC RBC AUTO-ENTMCNC: 34.2 G/DL (ref 31–36)
MCV RBC AUTO: 85.3 FL (ref 80–100)
MONOCYTES NFR BLD: 0.63 K/UL (ref 0–1.3)
MONOCYTES NFR BLD: 7 %
NEUTROPHILS NFR BLD: 63 %
NEUTS SEG NFR BLD: 5.33 K/UL (ref 1.7–7.7)
NITRITE UR QL STRIP: NEGATIVE
PH UR STRIP: 6 [PH] (ref 5–8)
PLATELET # BLD AUTO: 286 K/UL (ref 135–450)
PMV BLD AUTO: 8.9 FL (ref 9.4–12.4)
POTASSIUM SERPL-SCNC: 3.3 MMOL/L (ref 3.5–5.1)
PROT SERPL-MCNC: 7.4 G/DL (ref 6.4–8.2)
PROT UR STRIP-MCNC: NEGATIVE MG/DL
RBC # BLD AUTO: 5.11 M/UL (ref 4–5.2)
SODIUM SERPL-SCNC: 141 MMOL/L (ref 136–145)
SP GR UR STRIP: 1.01 (ref 1–1.03)
UROBILINOGEN UR STRIP-ACNC: 0.2 EU/DL (ref 0–1)
WBC OTHER # BLD: 8.5 K/UL (ref 4–11)

## 2025-04-27 PROCEDURE — 83690 ASSAY OF LIPASE: CPT

## 2025-04-27 PROCEDURE — 6370000000 HC RX 637 (ALT 250 FOR IP): Performed by: EMERGENCY MEDICINE

## 2025-04-27 PROCEDURE — 99284 EMERGENCY DEPT VISIT MOD MDM: CPT

## 2025-04-27 PROCEDURE — 83735 ASSAY OF MAGNESIUM: CPT

## 2025-04-27 PROCEDURE — 85025 COMPLETE CBC W/AUTO DIFF WBC: CPT

## 2025-04-27 PROCEDURE — 74176 CT ABD & PELVIS W/O CONTRAST: CPT

## 2025-04-27 PROCEDURE — 80053 COMPREHEN METABOLIC PANEL: CPT

## 2025-04-27 PROCEDURE — 96374 THER/PROPH/DIAG INJ IV PUSH: CPT

## 2025-04-27 PROCEDURE — 96375 TX/PRO/DX INJ NEW DRUG ADDON: CPT

## 2025-04-27 PROCEDURE — 6370000000 HC RX 637 (ALT 250 FOR IP)

## 2025-04-27 PROCEDURE — 81003 URINALYSIS AUTO W/O SCOPE: CPT

## 2025-04-27 PROCEDURE — 6360000002 HC RX W HCPCS

## 2025-04-27 RX ORDER — DROPERIDOL 2.5 MG/ML
0.62 INJECTION, SOLUTION INTRAMUSCULAR; INTRAVENOUS EVERY 6 HOURS PRN
Status: DISCONTINUED | OUTPATIENT
Start: 2025-04-27 | End: 2025-04-27 | Stop reason: HOSPADM

## 2025-04-27 RX ORDER — POTASSIUM CHLORIDE 1500 MG/1
20 TABLET, EXTENDED RELEASE ORAL ONCE
Status: COMPLETED | OUTPATIENT
Start: 2025-04-27 | End: 2025-04-27

## 2025-04-27 RX ORDER — KETOROLAC TROMETHAMINE 30 MG/ML
30 INJECTION, SOLUTION INTRAMUSCULAR; INTRAVENOUS ONCE
Status: COMPLETED | OUTPATIENT
Start: 2025-04-27 | End: 2025-04-27

## 2025-04-27 RX ORDER — KETOROLAC TROMETHAMINE 30 MG/ML
30 INJECTION, SOLUTION INTRAMUSCULAR; INTRAVENOUS ONCE
Status: DISCONTINUED | OUTPATIENT
Start: 2025-04-27 | End: 2025-04-27

## 2025-04-27 RX ORDER — ONDANSETRON 4 MG/1
4 TABLET, ORALLY DISINTEGRATING ORAL ONCE
Status: COMPLETED | OUTPATIENT
Start: 2025-04-27 | End: 2025-04-27

## 2025-04-27 RX ORDER — NAPROXEN 500 MG/1
500 TABLET ORAL 2 TIMES DAILY WITH MEALS
Qty: 30 TABLET | Refills: 0 | Status: SHIPPED | OUTPATIENT
Start: 2025-04-27 | End: 2025-05-12

## 2025-04-27 RX ORDER — ONDANSETRON 4 MG/1
4 TABLET, FILM COATED ORAL EVERY 8 HOURS PRN
Qty: 21 TABLET | Refills: 0 | Status: SHIPPED | OUTPATIENT
Start: 2025-04-27 | End: 2025-05-04

## 2025-04-27 RX ADMIN — DROPERIDOL 0.62 MG: 2.5 INJECTION, SOLUTION INTRAMUSCULAR; INTRAVENOUS at 16:53

## 2025-04-27 RX ADMIN — POTASSIUM CHLORIDE 20 MEQ: 1500 TABLET, EXTENDED RELEASE ORAL at 16:56

## 2025-04-27 RX ADMIN — ONDANSETRON 4 MG: 4 TABLET, ORALLY DISINTEGRATING ORAL at 15:59

## 2025-04-27 RX ADMIN — ALUMINUM HYDROXIDE, MAGNESIUM HYDROXIDE, AND SIMETHICONE 40 ML: 1200; 120; 1200 SUSPENSION ORAL at 16:53

## 2025-04-27 RX ADMIN — KETOROLAC TROMETHAMINE 30 MG: 30 INJECTION, SOLUTION INTRAMUSCULAR; INTRAVENOUS at 16:02

## 2025-04-27 ASSESSMENT — PAIN DESCRIPTION - DESCRIPTORS
DESCRIPTORS: ACHING;STABBING
DESCRIPTORS: SHARP

## 2025-04-27 ASSESSMENT — LIFESTYLE VARIABLES
HOW MANY STANDARD DRINKS CONTAINING ALCOHOL DO YOU HAVE ON A TYPICAL DAY: 1 OR 2
HOW OFTEN DO YOU HAVE A DRINK CONTAINING ALCOHOL: MONTHLY OR LESS

## 2025-04-27 ASSESSMENT — PAIN - FUNCTIONAL ASSESSMENT: PAIN_FUNCTIONAL_ASSESSMENT: 0-10

## 2025-04-27 ASSESSMENT — PAIN SCALES - GENERAL
PAINLEVEL_OUTOF10: 8
PAINLEVEL_OUTOF10: 7

## 2025-04-27 ASSESSMENT — PAIN DESCRIPTION - ORIENTATION
ORIENTATION: LEFT
ORIENTATION: LEFT

## 2025-04-27 ASSESSMENT — PAIN DESCRIPTION - LOCATION
LOCATION: FLANK
LOCATION: FLANK

## 2025-04-27 NOTE — ED PROVIDER NOTES
ED Attending Attestation Note    This patient was seen by the advanced practice provider.     I personally saw the patient. Management plan and care decisions have been discussed with me and I made/approved the management plan and take responsibility for patient management.     Briefly, 45 y.o. female presents with left-sided flank pain/ LUQ. Sharp/stabbing, associated nausea without vomiting .     Focused exam:   Gen: 45 y.o. female, NAD, nontoxic appearing  HEENT: NCAT. MMM, PERRL. EOMI.   CV: RRR w/o MRG  Vascular: intact and symmetric radial and DP pulses bilaterally  Lungs: CTAB. No incr WOB.   Abdomen: Soft, minimal left-sided flank tenderness, no CVA tenderness, nondistended. No rebound/guarding.   Neuro: awake and alert, speech clear w/o aphasia; intact and symmetric strength and sensation in all 4 extremities, CN 2-12 intact bilaterally    MDM:   This is a 45-year-old woman presenting with left flank/left upper quadrant pain.  She has an associated nausea without vomiting.  She is afebrile, nontoxic-appearing.  Vital stable, laboratory assessment shows slightly elevated lipase.  CT abdomen and pelvis is unremarkable.  Potentially early pancreatitis is explaining her symptoms.  Additional differential diagnosis would include peptic ulcer disease, enteritis, gastritis.     Nursing notes, vital signs reviewed.       I independently interpreted the following studies   CT ABDOMEN PELVIS WO CONTRAST Additional Contrast? None   Final Result      1. No acute intra-abdominopelvic abnormality.  No evidence of urolithiasis or hydronephrosis.      Electronically signed by Tom Hameed MD        Labs Reviewed   CBC WITH AUTO DIFFERENTIAL - Abnormal; Notable for the following components:       Result Value    MPV 8.9 (*)     Immature Granulocytes % 1 (*)     All other components within normal limits   COMPREHENSIVE METABOLIC PANEL W/ REFLEX TO MG FOR LOW K - Abnormal; Notable for the following components:    Potassium

## 2025-04-27 NOTE — ED NOTES
Verbal and written discharge instructions along with 2 prescription(s) discussed with patient. Patient verbally acknowledged all information provided. Patient is alert and acting appropriately for developmental age. Skin p/w/d. Respirations even and unlabored. Patient is to follow up as indicated. Encouraged to return patient to ED for any new or worsening symptoms. Patient denies any questions, needs, or concerns to note.

## 2025-04-27 NOTE — ED PROVIDER NOTES
[facility] EMERGENCY DEPARTMENT  EMERGENCY DEPARTMENT ENCOUNTER        Pt Name: Danette Clark  MRN: [id number]  Birthdate 1980  Date of evaluation: 4/27/2025  Provider: Gricel Art PA-C  PCP: Rojelio Lee MD  Note Started: 3:12 PM EDT 4/27/25       I have seen and evaluated this patient with my supervising physician Claudia Wells MD.      CHIEF COMPLAINT       Chief Complaint   Patient presents with    Flank Pain     Pt c/o left flank pain that began this morning. Pt endorses nausea without vomiting. Denies any urinary symptoms.        HISTORY OF PRESENT ILLNESS: 1 or more Elements     History From: Patient      Danette Clark is a 45 y.o. female who presents to the ED with a chief complaint of left-sided flank pain with radiation into her left lower quadrant/suprapubic region that started this morning while at work.  Describes pain as sharp, stabbing and constant since its onset.  Admits to associated nausea without vomiting.  Denies fevers, chills.  Denies weakness, numbness, tingling down the extremity.  Denies urinary symptoms including hematuria, dysuria, increase frequency/urgency with urination.    Nursing Notes were all reviewed and agreed with or any disagreements were addressed in the HPI.    REVIEW OF SYSTEMS :      Review of Systems    Positives and Pertinent negatives as per HPI.     SURGICAL HISTORY     Past Surgical History:   Procedure Laterality Date    ACHILLES TENDON SURGERY Right 11/01/2023    APPENDECTOMY  2007    CHOLECYSTECTOMY  2007    HYSTERECTOMY, VAGINAL      KNEE ARTHROSCOPY Right 2010    torn meniscus    KNEE SURGERY Right 2005    Knee cap    OVARY REMOVAL Right     PCOS    SINUS SURGERY  2000    YENNIFER AND BSO (CERVIX REMOVED)  2008    ovarian cyst, adenomyosis- benign    TUBAL LIGATION  2005    TYMPANOSTOMY TUBE PLACEMENT Bilateral     x 3        CURRENTMEDICATIONS       Previous Medications    ALPRAZOLAM (XANAX) 0.25 MG TABLET        AMOXICILLIN

## 2025-04-27 NOTE — DISCHARGE INSTRUCTIONS
Dear Danette Clark,     Thank you for the privilege of caring for you today in the Emergency Department.     I sent in a prescription for naproxen to your pharmacy for acute pain management.  While taking naproxen, please do not take any other medications in the NSAID class which includes ibuprofen, Advil, Motrin, Aleve, as and doing so increases the risk of peptic ulcers.  I also sent in a prescription for Zofran for acute management of nausea.  Please follow-up with your PCP in 1 week.  Please to hydrate plenty of fluids.    Please return to the Emergency Department if you develop any new or worsening symptoms, or for any other concerns.     Regards,     Gricel Art PA-C

## 2025-04-28 NOTE — TELEPHONE ENCOUNTER
The medication is APPROVED.    Outcome  Approved on April 24 by Melo DOSHI 2017  CaseId:[id number];Status:Approved;Review Type:Prior Auth;Coverage Start Date:04/24/2025;Coverage End Date:04/24/2026;  Effective Date: 4/24/2025  Authorization Expiration Date: 4/24/2026    If this requires a response please respond to the pool ( P [facility] MEDICATION PRE-AUTH).      Thank you please advise patient.

## 2025-04-30 ENCOUNTER — PATIENT MESSAGE (OUTPATIENT)
Dept: PRIMARY CARE CLINIC | Age: 45
End: 2025-04-30

## 2025-05-01 ENCOUNTER — APPOINTMENT (OUTPATIENT)
Age: 45
DRG: 392 | End: 2025-05-01
Payer: COMMERCIAL

## 2025-05-01 ENCOUNTER — OFFICE VISIT (OUTPATIENT)
Dept: PRIMARY CARE CLINIC | Age: 45
End: 2025-05-01
Payer: COMMERCIAL

## 2025-05-01 ENCOUNTER — HOSPITAL ENCOUNTER (INPATIENT)
Age: 45
LOS: 1 days | Discharge: HOME | DRG: 392 | End: 2025-05-03
Attending: EMERGENCY MEDICINE | Admitting: HOSPITALIST
Payer: COMMERCIAL

## 2025-05-01 VITALS
BODY MASS INDEX: 25.51 KG/M2 | SYSTOLIC BLOOD PRESSURE: 110 MMHG | DIASTOLIC BLOOD PRESSURE: 78 MMHG | TEMPERATURE: 98 F | WEIGHT: 144 LBS | HEART RATE: 82 BPM | OXYGEN SATURATION: 99 %

## 2025-05-01 DIAGNOSIS — R10.12 ACUTE LUQ PAIN: Primary | ICD-10-CM

## 2025-05-01 DIAGNOSIS — R10.12 LEFT UPPER QUADRANT ABDOMINAL PAIN: Primary | ICD-10-CM

## 2025-05-01 DIAGNOSIS — K59.00 CONSTIPATION, UNSPECIFIED CONSTIPATION TYPE: ICD-10-CM

## 2025-05-01 PROBLEM — R10.9 ABDOMINAL PAIN: Status: ACTIVE | Noted: 2025-05-01

## 2025-05-01 LAB
ALBUMIN SERPL-MCNC: 4.8 G/DL (ref 3.4–5)
ALBUMIN/GLOB SERPL: 1.5 {RATIO}
ALP SERPL-CCNC: 116 U/L (ref 40–129)
ALT SERPL-CCNC: 23 U/L (ref 10–40)
ANION GAP SERPL CALCULATED.3IONS-SCNC: 16 MMOL/L (ref 3–16)
AST SERPL-CCNC: 27 U/L (ref 15–37)
BASOPHILS # BLD: 0.03 K/UL (ref 0–0.2)
BASOPHILS NFR BLD: 0 %
BILIRUB SERPL-MCNC: 0.5 MG/DL (ref 0–1)
BILIRUB UR QL STRIP: NEGATIVE
BUN SERPL-MCNC: 12 MG/DL (ref 7–20)
CALCIUM SERPL-MCNC: 9.5 MG/DL (ref 8.3–10.6)
CHLORIDE SERPL-SCNC: 103 MMOL/L (ref 99–110)
CLARITY UR: CLEAR
CO2 SERPL-SCNC: 22 MMOL/L (ref 21–32)
COLOR UR: YELLOW
COMMENT: ABNORMAL
CREAT SERPL-MCNC: 0.8 MG/DL (ref 0.5–1)
EOSINOPHIL # BLD: 0.1 K/UL (ref 0–0.6)
EOSINOPHILS RELATIVE PERCENT: 1 %
ERYTHROCYTE [DISTWIDTH] IN BLOOD BY AUTOMATED COUNT: 12.5 % (ref 12.4–15.4)
GFR, ESTIMATED: >90 ML/MIN/1.73M2
GLUCOSE SERPL-MCNC: 81 MG/DL (ref 70–99)
GLUCOSE UR STRIP-MCNC: NEGATIVE MG/DL
HCT VFR BLD AUTO: 46.5 % (ref 36–48)
HGB BLD-MCNC: 16 G/DL (ref 12–16)
HGB UR QL STRIP.AUTO: NEGATIVE
IMM GRANULOCYTES # BLD AUTO: 0.03 K/UL (ref 0–0.5)
IMM GRANULOCYTES NFR BLD: 0 %
KETONES UR STRIP-MCNC: NEGATIVE MG/DL
LACTATE BLDV-SCNC: 1.5 MMOL/L (ref 0.4–1.9)
LEUKOCYTE ESTERASE UR QL STRIP: NEGATIVE
LIPASE SERPL-CCNC: 29 U/L (ref 13–60)
LYMPHOCYTES NFR BLD: 1.9 K/UL (ref 1–5.1)
LYMPHOCYTES RELATIVE PERCENT: 24 %
MCH RBC QN AUTO: 28.7 PG (ref 26–34)
MCHC RBC AUTO-ENTMCNC: 34.4 G/DL (ref 31–36)
MCV RBC AUTO: 83.3 FL (ref 80–100)
MONOCYTES NFR BLD: 0.61 K/UL (ref 0–1.3)
MONOCYTES NFR BLD: 8 %
NEUTROPHILS NFR BLD: 67 %
NEUTS SEG NFR BLD: 5.31 K/UL (ref 1.7–7.7)
NITRITE UR QL STRIP: NEGATIVE
PH UR STRIP: 6 [PH] (ref 5–8)
PLATELET # BLD AUTO: 268 K/UL (ref 135–450)
PMV BLD AUTO: 8.7 FL
POTASSIUM SERPL-SCNC: 3.7 MMOL/L (ref 3.5–5.1)
PROT SERPL-MCNC: 7.9 G/DL (ref 6.4–8.2)
PROT UR STRIP-MCNC: NEGATIVE MG/DL
RBC # BLD AUTO: 5.58 M/UL (ref 4–5.2)
SODIUM SERPL-SCNC: 140 MMOL/L (ref 136–145)
SP GR UR STRIP: <1.005 (ref 1–1.03)
UROBILINOGEN UR STRIP-ACNC: 0.2 EU/DL (ref 0–1)
WBC OTHER # BLD: 8 K/UL (ref 4–11)

## 2025-05-01 PROCEDURE — G0378 HOSPITAL OBSERVATION PER HR: HCPCS

## 2025-05-01 PROCEDURE — 6360000002 HC RX W HCPCS: Performed by: FAMILY MEDICINE

## 2025-05-01 PROCEDURE — 96376 TX/PRO/DX INJ SAME DRUG ADON: CPT

## 2025-05-01 PROCEDURE — 6370000000 HC RX 637 (ALT 250 FOR IP): Performed by: FAMILY MEDICINE

## 2025-05-01 PROCEDURE — 96375 TX/PRO/DX INJ NEW DRUG ADDON: CPT

## 2025-05-01 PROCEDURE — 2580000003 HC RX 258: Performed by: EMERGENCY MEDICINE

## 2025-05-01 PROCEDURE — 6370000000 HC RX 637 (ALT 250 FOR IP): Performed by: EMERGENCY MEDICINE

## 2025-05-01 PROCEDURE — 83690 ASSAY OF LIPASE: CPT

## 2025-05-01 PROCEDURE — 80053 COMPREHEN METABOLIC PANEL: CPT

## 2025-05-01 PROCEDURE — 99285 EMERGENCY DEPT VISIT HI MDM: CPT

## 2025-05-01 PROCEDURE — 74174 CTA ABD&PLVS W/CONTRAST: CPT

## 2025-05-01 PROCEDURE — 99214 OFFICE O/P EST MOD 30 MIN: CPT | Performed by: NURSE PRACTITIONER

## 2025-05-01 PROCEDURE — 96361 HYDRATE IV INFUSION ADD-ON: CPT

## 2025-05-01 PROCEDURE — 81003 URINALYSIS AUTO W/O SCOPE: CPT

## 2025-05-01 PROCEDURE — 6360000002 HC RX W HCPCS: Performed by: EMERGENCY MEDICINE

## 2025-05-01 PROCEDURE — 85025 COMPLETE CBC W/AUTO DIFF WBC: CPT

## 2025-05-01 PROCEDURE — 2580000003 HC RX 258: Performed by: FAMILY MEDICINE

## 2025-05-01 PROCEDURE — 83605 ASSAY OF LACTIC ACID: CPT

## 2025-05-01 PROCEDURE — 6360000004 HC RX CONTRAST MEDICATION: Performed by: EMERGENCY MEDICINE

## 2025-05-01 PROCEDURE — 96374 THER/PROPH/DIAG INJ IV PUSH: CPT

## 2025-05-01 RX ORDER — ALPRAZOLAM 0.5 MG
0.5 TABLET ORAL EVERY 4 HOURS PRN
Status: DISCONTINUED | OUTPATIENT
Start: 2025-05-01 | End: 2025-05-01 | Stop reason: DRUGHIGH

## 2025-05-01 RX ORDER — MORPHINE SULFATE 4 MG/ML
4 INJECTION, SOLUTION INTRAMUSCULAR; INTRAVENOUS ONCE
Refills: 0 | Status: COMPLETED | OUTPATIENT
Start: 2025-05-01 | End: 2025-05-01

## 2025-05-01 RX ORDER — SODIUM CHLORIDE 0.9 % (FLUSH) 0.9 %
5-40 SYRINGE (ML) INJECTION EVERY 12 HOURS SCHEDULED
Status: DISCONTINUED | OUTPATIENT
Start: 2025-05-01 | End: 2025-05-03 | Stop reason: HOSPADM

## 2025-05-01 RX ORDER — ALPRAZOLAM 0.5 MG
0.25 TABLET ORAL EVERY 4 HOURS PRN
Status: DISCONTINUED | OUTPATIENT
Start: 2025-05-01 | End: 2025-05-03 | Stop reason: HOSPADM

## 2025-05-01 RX ORDER — METOCLOPRAMIDE HYDROCHLORIDE 5 MG/ML
10 INJECTION INTRAMUSCULAR; INTRAVENOUS EVERY 6 HOURS PRN
Status: DISCONTINUED | OUTPATIENT
Start: 2025-05-01 | End: 2025-05-03 | Stop reason: HOSPADM

## 2025-05-01 RX ORDER — SODIUM CHLORIDE 9 MG/ML
INJECTION, SOLUTION INTRAVENOUS CONTINUOUS
Status: DISCONTINUED | OUTPATIENT
Start: 2025-05-01 | End: 2025-05-02

## 2025-05-01 RX ORDER — ENOXAPARIN SODIUM 100 MG/ML
40 INJECTION SUBCUTANEOUS DAILY
Status: DISCONTINUED | OUTPATIENT
Start: 2025-05-01 | End: 2025-05-03 | Stop reason: HOSPADM

## 2025-05-01 RX ORDER — POTASSIUM CHLORIDE 7.45 MG/ML
10 INJECTION INTRAVENOUS PRN
Status: DISCONTINUED | OUTPATIENT
Start: 2025-05-01 | End: 2025-05-03 | Stop reason: HOSPADM

## 2025-05-01 RX ORDER — MAGNESIUM SULFATE HEPTAHYDRATE 40 MG/ML
2000 INJECTION, SOLUTION INTRAVENOUS PRN
Status: DISCONTINUED | OUTPATIENT
Start: 2025-05-01 | End: 2025-05-03 | Stop reason: HOSPADM

## 2025-05-01 RX ORDER — ACETAMINOPHEN 650 MG/1
650 SUPPOSITORY RECTAL EVERY 6 HOURS PRN
Status: DISCONTINUED | OUTPATIENT
Start: 2025-05-01 | End: 2025-05-03 | Stop reason: HOSPADM

## 2025-05-01 RX ORDER — SENNOSIDES A AND B 8.6 MG/1
1 TABLET, FILM COATED ORAL 2 TIMES DAILY PRN
Status: DISCONTINUED | OUTPATIENT
Start: 2025-05-01 | End: 2025-05-03 | Stop reason: HOSPADM

## 2025-05-01 RX ORDER — POTASSIUM CHLORIDE 1500 MG/1
40 TABLET, EXTENDED RELEASE ORAL PRN
Status: DISCONTINUED | OUTPATIENT
Start: 2025-05-01 | End: 2025-05-03 | Stop reason: HOSPADM

## 2025-05-01 RX ORDER — SENNOSIDES 8.8 MG/5ML
5 LIQUID ORAL 2 TIMES DAILY PRN
Status: DISCONTINUED | OUTPATIENT
Start: 2025-05-01 | End: 2025-05-01 | Stop reason: RX

## 2025-05-01 RX ORDER — 0.9 % SODIUM CHLORIDE 0.9 %
1000 INTRAVENOUS SOLUTION INTRAVENOUS ONCE
Status: COMPLETED | OUTPATIENT
Start: 2025-05-01 | End: 2025-05-01

## 2025-05-01 RX ORDER — KETOROLAC TROMETHAMINE 30 MG/ML
30 INJECTION, SOLUTION INTRAMUSCULAR; INTRAVENOUS ONCE
Status: COMPLETED | OUTPATIENT
Start: 2025-05-01 | End: 2025-05-01

## 2025-05-01 RX ORDER — DICYCLOMINE HYDROCHLORIDE 10 MG/1
20 CAPSULE ORAL 4 TIMES DAILY
Status: DISCONTINUED | OUTPATIENT
Start: 2025-05-01 | End: 2025-05-03 | Stop reason: HOSPADM

## 2025-05-01 RX ORDER — ACETAMINOPHEN 325 MG/1
650 TABLET ORAL EVERY 6 HOURS PRN
Status: DISCONTINUED | OUTPATIENT
Start: 2025-05-01 | End: 2025-05-03 | Stop reason: HOSPADM

## 2025-05-01 RX ORDER — SODIUM CHLORIDE 0.9 % (FLUSH) 0.9 %
5-40 SYRINGE (ML) INJECTION PRN
Status: DISCONTINUED | OUTPATIENT
Start: 2025-05-01 | End: 2025-05-03 | Stop reason: HOSPADM

## 2025-05-01 RX ORDER — SODIUM CHLORIDE 9 MG/ML
INJECTION, SOLUTION INTRAVENOUS PRN
Status: DISCONTINUED | OUTPATIENT
Start: 2025-05-01 | End: 2025-05-03 | Stop reason: HOSPADM

## 2025-05-01 RX ORDER — IOPAMIDOL 755 MG/ML
75 INJECTION, SOLUTION INTRAVASCULAR
Status: COMPLETED | OUTPATIENT
Start: 2025-05-01 | End: 2025-05-01

## 2025-05-01 RX ADMIN — SODIUM CHLORIDE: 0.9 INJECTION, SOLUTION INTRAVENOUS at 17:35

## 2025-05-01 RX ADMIN — HYDROMORPHONE HYDROCHLORIDE 0.5 MG: 1 INJECTION, SOLUTION INTRAMUSCULAR; INTRAVENOUS; SUBCUTANEOUS at 20:52

## 2025-05-01 RX ADMIN — MAJOR MAGNESIUM CITRATE ORAL SOLUTION - LEMON 296 ML: 1.75 LIQUID ORAL at 16:42

## 2025-05-01 RX ADMIN — KETOROLAC TROMETHAMINE 30 MG: 30 INJECTION, SOLUTION INTRAMUSCULAR at 13:20

## 2025-05-01 RX ADMIN — SODIUM CHLORIDE: 0.9 INJECTION, SOLUTION INTRAVENOUS at 22:23

## 2025-05-01 RX ADMIN — METOCLOPRAMIDE 10 MG: 5 INJECTION, SOLUTION INTRAMUSCULAR; INTRAVENOUS at 19:03

## 2025-05-01 RX ADMIN — HYDROMORPHONE HYDROCHLORIDE 0.5 MG: 1 INJECTION, SOLUTION INTRAMUSCULAR; INTRAVENOUS; SUBCUTANEOUS at 16:44

## 2025-05-01 RX ADMIN — SODIUM CHLORIDE 1000 ML: 0.9 INJECTION, SOLUTION INTRAVENOUS at 16:42

## 2025-05-01 RX ADMIN — HYDROMORPHONE HYDROCHLORIDE 1 MG: 1 INJECTION, SOLUTION INTRAMUSCULAR; INTRAVENOUS; SUBCUTANEOUS at 11:30

## 2025-05-01 RX ADMIN — MORPHINE SULFATE 4 MG: 4 INJECTION, SOLUTION INTRAMUSCULAR; INTRAVENOUS at 15:07

## 2025-05-01 RX ADMIN — DICYCLOMINE HYDROCHLORIDE 20 MG: 10 CAPSULE ORAL at 20:50

## 2025-05-01 RX ADMIN — IOPAMIDOL 75 ML: 755 INJECTION, SOLUTION INTRAVENOUS at 12:19

## 2025-05-01 RX ADMIN — FAMOTIDINE 20 MG: 10 INJECTION, SOLUTION INTRAVENOUS at 14:05

## 2025-05-01 ASSESSMENT — ENCOUNTER SYMPTOMS
DIARRHEA: 0
NAUSEA: 1
SHORTNESS OF BREATH: 0
VOMITING: 0
COUGH: 0
ABDOMINAL PAIN: 1
BACK PAIN: 1
WHEEZING: 0

## 2025-05-01 ASSESSMENT — PAIN DESCRIPTION - ONSET
ONSET: GRADUAL
ONSET: GRADUAL

## 2025-05-01 ASSESSMENT — PAIN SCALES - GENERAL
PAINLEVEL_OUTOF10: 9
PAINLEVEL_OUTOF10: 9
PAINLEVEL_OUTOF10: 5
PAINLEVEL_OUTOF10: 9
PAINLEVEL_OUTOF10: 8
PAINLEVEL_OUTOF10: 0
PAINLEVEL_OUTOF10: 9
PAINLEVEL_OUTOF10: 9
PAINLEVEL_OUTOF10: 7
PAINLEVEL_OUTOF10: 0

## 2025-05-01 ASSESSMENT — PAIN DESCRIPTION - ORIENTATION
ORIENTATION: LEFT;UPPER
ORIENTATION: RIGHT;UPPER
ORIENTATION: LEFT;UPPER
ORIENTATION: RIGHT;UPPER
ORIENTATION: LOWER
ORIENTATION: LEFT;UPPER

## 2025-05-01 ASSESSMENT — PAIN DESCRIPTION - LOCATION
LOCATION: ABDOMEN

## 2025-05-01 ASSESSMENT — PAIN DESCRIPTION - DESCRIPTORS
DESCRIPTORS: SHARP
DESCRIPTORS: SHARP;STABBING
DESCRIPTORS: SHARP
DESCRIPTORS: SHOOTING;SHARP
DESCRIPTORS: ACHING;CRAMPING
DESCRIPTORS: SHARP
DESCRIPTORS: ACHING;CRAMPING
DESCRIPTORS: SHARP
DESCRIPTORS: SHARP;STABBING

## 2025-05-01 ASSESSMENT — PAIN DESCRIPTION - FREQUENCY
FREQUENCY: INTERMITTENT
FREQUENCY: INTERMITTENT

## 2025-05-01 ASSESSMENT — LIFESTYLE VARIABLES
HOW OFTEN DO YOU HAVE A DRINK CONTAINING ALCOHOL: MONTHLY OR LESS
HOW MANY STANDARD DRINKS CONTAINING ALCOHOL DO YOU HAVE ON A TYPICAL DAY: 1 OR 2

## 2025-05-01 ASSESSMENT — PAIN DESCRIPTION - PAIN TYPE
TYPE: ACUTE PAIN
TYPE: ACUTE PAIN

## 2025-05-01 NOTE — PROGRESS NOTES
4 Eyes Admission Assessment     I agree as the admission nurse that 2 RN's have performed a thorough Head to Toe Skin Assessment on the patient. ALL assessment sites listed below have been assessed on admission.       Areas assessed by both nurses: Radha RN  [x]   Head, Face, and Ears   [x]   Shoulders, Back, and Chest  [x]   Arms, Elbows, and Hands   [x]   Coccyx, Sacrum, and Ischium  [x]   Legs, Feet, and Heels        Does the Patient have Skin Breakdown?   NO          Shane Prevention initiated:  No   Wound Care Orders initiated:  No      [facility] nurse consulted for Pressure Injury (Stage 3,4, Unstageable, DTI, NWPT, and Complex wounds) or Shane score 18 or lower:  NA      Nurse 1 eSignature: Electronically signed by Radha Guillory RN on 5/1/25 at 5:35 PM EDT    **SHARE this note so that the co-signing nurse is able to place an eSignature**    Nurse 2 eSignature: Electronically signed by Tanya Galarza RN on 5/1/25 at 5:50 PM EDT

## 2025-05-01 NOTE — ED NOTES
ED to Inpatient Handoff SBAR    Patient Name: Danette Clark   :  1980  45 y.o.   MRN:  [id number]  Preferred Name    ED Room #:    Family/Caregiver Present no     Chief Complaint Abdominal Pain (Pt arrives via EMS from urgent care for LUQ abdominal pain. Reports she was seen  in ED and was told she \"had potential pancreatitis\". Pt reports the pain has not improved, reports nausea, denies diarrhea/vomiting. Reports worsening pain after eating. )       Restraints no   Sitter no   Sepsis Risk Score    Isolation No active isolations   Fall Risk Assessment Presents to emergency department  because of falls (Syncope, seizure, or loss of consciousness): No, Age > 70: No, Altered Mental Status, Intoxication with alcohol or substance confusion (Disorientation, impaired judgment, poor safety awaremess, or inability to follow instructions): No, Impaired Mobility: Ambulates or transfers with assistive devices or assistance; Unable to ambulate or transer.: No, Nursing Judgement: No     Situation  Code Status: Prior Full code.    Allergies: Ultram [tramadol hcl] and Vicodin [hydrocodone-acetaminophen]  Weight: Patient Vitals for the past 96 hrs (Last 3 readings):   Weight   25 1104 63.5 kg (140 lb)     Arrived from: home  Hospital Problem/Diagnosis:  Principal Problem:    Abdominal pain  Resolved Problems:    * No resolved hospital problems. *    Imaging:   CTA ABDOMEN PELVIS W CONTRAST   Final Result   1.  No acute process in the abdomen or pelvis.   2.  Large stool burden suggesting constipation.      Electronically signed by Jay Jay Barbosa        Abnormal labs:   Abnormal Labs Reviewed   CBC WITH AUTO DIFFERENTIAL - Abnormal; Notable for the following components:       Result Value    RBC 5.58 (*)     All other components within normal limits   URINALYSIS WITH REFLEX TO CULTURE - Abnormal; Notable for the following components:    Specific Gravity, UA <1.005 (*)     All other components within normal  ondansetron (ZOFRAN) 4 MG tablet Take 1 tablet by mouth every 8 hours as needed for Nausea or Vomiting 21 tablet 0    naproxen (NAPROSYN) 500 MG tablet Take 1 tablet by mouth 2 times daily (with meals) for 15 days 30 tablet 0    amoxicillin (AMOXIL) 875 MG tablet Take 1 tablet by mouth 2 times daily for 10 days 20 tablet 0    dicyclomine (BENTYL) 20 MG tablet Take 1 tablet by mouth 4 times daily for 20 doses 40 tablet 1    gabapentin (NEURONTIN) 600 MG tablet       ALPRAZolam (XANAX) 0.25 MG tablet       QULIPTA 60 MG TABS TAKE 1 TABLET BY MOUTH DAILY 30 tablet 5    rizatriptan (MAXALT) 10 MG tablet Take 1 tablet by mouth once as needed for Migraine May repeat in 2 hours if needed 12 tablet 2    scopolamine (TRANSDERM-SCOP) transdermal patch Place 1 patch onto the skin every 72 hours 7 patch 1    Magnesium 400 MG TABS Take 400 mg by mouth daily At night            You may also review the ED PT Care Timeline found under the Summary Nursing Index tab.    Recommendation    Additional Comments: Pt alert and oriented      Room assignment: 4204   Inpatient nurse:   Inpatient nurse phone number:     Emergency department Nurse: SULLY Reyes   Nurse call back #: [phone]        Electronically signed by: Electronically signed by Amy Garcia RN on 5/1/2025 at 4:57 PMNurse Handoff Report

## 2025-05-01 NOTE — H&P
V2.0  History and Physical      Name:  Danette Clark /Age/Sex: 1980  (45 y.o. female)   MRN & CSN:  [id number] & [id number] Encounter Date/Time: 2025 4:52 PM EDT   Location:   PCP: Rojelio Lee MD       Hospital Day: 1    Assessment and Plan:   Danette Clark is a 45 y.o. female with a pmh of IBS who presents with Abdominal pain    Hospital Problems           Last Modified POA    * (Principal) Abdominal pain 2025 Yes       Plan:  Abdominal pain  History of IBS  2 times ED visit, 1 times PCP visit in the last 2-3 days.  Presenting with left lower/upper quadrant abdominal pain for the last few days, have gotten worse  EGD, colonoscopy obtained in the last 1 year and were  Labs unremarkable, UA unremarkable  CT abdomen pelvis shows large stool burden suggesting constipation   IV fluid resuscitation  Mag citrate  Enema as needed  Senokot  Pain control, Bentyl 20 mg 4 times a day  Antiemetic with Reglan    2.  Anxiety  Continue with home dose Ativan 0.5 mg.  Anxiety    Will continue to follow, monitor and manage chronic medical conditions medications to be    Disposition:   Current Living situation: Home  Expected Disposition: Home  Estimated D/C: 1-2-day    Diet No diet orders on file   DVT Prophylaxis [x] Lovenox, []  Heparin, [] SCDs, [x] Ambulation,  [] Eliquis, [] Xarelto, [] Coumadin   Code Status Prior   Surrogate Decision Maker/ POA Self     Personally reviewed Lab Studies and Imaging           History from:     patient, ED physician, medical record    History of Present Illness:     Chief Complaint: Left lower quadrant abdominal  Danette Clark is a 45 y.o. female with pmh of IBS, who presents with abdominal pain      Patient presents with left lower, upper abdominal pain radiates to the back.  Symptoms started 2-3 days ago and have gotten worse.  Patient today has been folded over due to the pain     Patient states she was diagnosed with IBS while in 6 grade.  Used to be IBS D and      Unable to Pay for Housing in the Last Year: No     Number of Times Moved in the Last Year: 0     Homeless in the Last Year: No       Medications:   Medications:    sodium chloride  1,000 mL IntraVENous Once      Infusions:   PRN Meds:      Labs      CBC:   Recent Labs     05/01/25  1118   WBC 8.0   HGB 16.0        BMP:    Recent Labs     05/01/25  1118      K 3.7      CO2 22   BUN 12   CREATININE 0.8   GLUCOSE 81     Hepatic:   Recent Labs     05/01/25  1118   AST 27   ALT 23   BILITOT 0.5   ALKPHOS 116     Lipids:   Lab Results   Component Value Date/Time    CHOL 231 10/04/2023 08:17 AM    HDL 75 10/04/2023 08:17 AM    TRIG 105 10/04/2023 08:17 AM     Hemoglobin A1C: No results found for: \"LABA1C\"  TSH:   Lab Results   Component Value Date/Time    TSH 0.67 01/10/2025 12:33 PM     Troponin: No results found for: \"TROPONINT\"  Lactic Acid: No results for input(s): \"LACTA\" in the last 72 hours.  BNP: No results for input(s): \"PROBNP\" in the last 72 hours.  UA:  Lab Results   Component Value Date/Time    NITRU NEGATIVE 05/01/2025 11:18 AM    COLORU Yellow 05/01/2025 11:18 AM    PHUR 6.0 05/01/2025 11:18 AM    PHUR 7.5 04/13/2024 04:59 AM    CLARITYU clear 04/01/2024 12:11 PM    CLARITYU CLEAR 03/27/2010 12:18 PM    SPECGRAV 1.015 04/01/2024 12:11 PM    LEUKOCYTESUR NEGATIVE 05/01/2025 11:18 AM    UROBILINOGEN 0.2 05/01/2025 11:18 AM    BILIRUBINUR NEGATIVE 05/01/2025 11:18 AM    BLOODU trace-intact 04/01/2024 12:11 PM    BLOODU NEGATIVE 03/27/2010 12:18 PM    GLUCOSEU NEGATIVE 05/01/2025 11:18 AM    GLUCOSEU NEGATIVE 03/27/2010 12:18 PM    KETUA NEGATIVE 05/01/2025 11:18 AM     Urine Cultures:   Lab Results   Component Value Date/Time    LABURIN No growth at 18 to 36 hours 04/01/2024 12:11 PM     Blood Cultures: No results found for: \"BC\"  No results found for: \"BLOODCULT2\"  Organism:   Lab Results   Component Value Date/Time    ORG Gardnerella vaginalis 10/03/2016 01:28 PM

## 2025-05-01 NOTE — ED NOTES
Pt back from radiology. Pt reports an increase in pain, requesting additional pain medication. Dr. Jackman notified.

## 2025-05-01 NOTE — PLAN OF CARE
Problem: Safety - Adult  Goal: Free from fall injury  Outcome: Progressing   Patient free from fall injury at this time

## 2025-05-01 NOTE — ED NOTES
Pt states her pain \"only got a little better\", pt states \"I am scared to go home in this much pain\". Dr. Jackman notified.

## 2025-05-01 NOTE — PROGRESS NOTES
ENCOUNTER DATE: 5/1/2025     NAME: Danette Clark   AGE: 45 y.o.   GENDER: female   YOB: 1980    Chief Complaint   Patient presents with    Follow-up     Pt in ED on 4/27/25 for LT flank pain , pain is worse    Nausea       ASSESSMENT/PLAN:  1. Acute LUQ pain  Previous ED notes reviewed including CT scan and labs  Lipase was elevated  Pain has significantly worsened and patient is in quite a bit of pain, doubled over and crying.  Significant LUQ tenderness and midepigastric tenderness.  Concern for acute pancreatitis versus PUD  Recommend to go back to ED for further evaluation.  Likely needs IV fluids, pain control and further workup  Reporting dizziness and severe abdominal pain, I do not feel safe letting her drive.  She made several phone calls to family members, they were not able to come and pick her up.  EMS called to transport      No follow-ups on file.     HPI:   Patient is here for an ED follow-up    Seen in ED 4/27/2025 for left flank pain that radiates to LLQ  CT showed no acute findings  Lipase slightly elevated  She was given IV medications, Zofran, GI cocktail  Symptoms improved after GI cocktail  Lipase was slightly elevated  on amoxicillin for sinus infection  Discharged home with prescription for naproxen and Zofran    Pain has worsened and is now severe  Nausea bad. No vomiting.   No fevers.   Small BM, which is her normal.  No change in stool.   Has eaten crackers, yogurt.   Pain worsens with eating.   Pain sharp, shooting, stabbing. Constant ache with waves of severe pain.   No h/o ulcers  Had EGD, cscope last year and were normal.     Taking Ibuprofen and not helping    S/p jakob due to not functioning  S/p appy      ROS:  Review of Systems   Constitutional:  Positive for appetite change. Negative for chills, diaphoresis, fatigue and fever.   Respiratory:  Negative for cough, shortness of breath and wheezing.    Cardiovascular:  Negative for chest pain.   Gastrointestinal:

## 2025-05-01 NOTE — PLAN OF CARE
Problem: Discharge Planning  Goal: Discharge to home or other facility with appropriate resources  Outcome: Progressing  Flowsheets (Taken 5/1/2025 1732 by Rdaha Guillory, RN)  Discharge to home or other facility with appropriate resources:   Identify barriers to discharge with patient and caregiver   Identify discharge learning needs (meds, wound care, etc)   Arrange for needed discharge resources and transportation as appropriate     Problem: Pain  Goal: Verbalizes/displays adequate comfort level or baseline comfort level  Outcome: Progressing     Problem: Safety - Adult  Goal: Free from fall injury  5/1/2025 1948 by Sabrina Perry, RN  Outcome: Progressing  5/1/2025 1823 by Radha Guillory, RN  Outcome: Progressing

## 2025-05-01 NOTE — ED PROVIDER NOTES
[facility] EMERGENCY DEPARTMENT  EMERGENCY DEPARTMENT ENCOUNTER        Pt Name: Danette Clark  MRN: [id number]  Birthdate 1980  Date of evaluation: 5/1/2025  Provider: Ama Jackman MD  PCP: Rojelio Lee MD  Note Started: 11:12 AM EDT 5/1/25    CHIEF COMPLAINT       Chief Complaint   Patient presents with    Abdominal Pain     Pt arrives via EMS from urgent care for LUQ abdominal pain. Reports she was seen Sunday in ED and was told she \"had potential pancreatitis\". Pt reports the pain has not improved, reports nausea, denies diarrhea/vomiting. Reports worsening pain after eating.        HISTORY OF PRESENT ILLNESS: 1 or more Elements     History from : Patient and EMS    Limitations to history : None    Danette Clark is a 45 y.o. female who presents to the emergency department with left upper quadrant abdominal pain.  Patient was seen last week for the same symptoms.  She states he got a little bit better but has continued to get worse.  She followed up with her PCP today who called EMS to bring the patient here.  She states that she has not had a lot to eat since the pain started but every time she does try to eat it makes the pain much worse.  It is mostly in the left upper quadrant and radiates around the side to her left upper back area.  She has had a previous hysterectomy, appendectomy and cholecystectomy.  EMS gave 4 mg of IM Zofran and she states her nausea is improved but she is still in pain    Nursing Notes were all reviewed and agreed with or any disagreements were addressed in the HPI.    REVIEW OF SYSTEMS :      Review of Systems    10 systems reviewed and negative except as in HPI/MDM    SURGICAL HISTORY     Past Surgical History:   Procedure Laterality Date    ACHILLES TENDON SURGERY Right 11/01/2023    APPENDECTOMY  2007    CHOLECYSTECTOMY  2007    HYSTERECTOMY, VAGINAL      KNEE ARTHROSCOPY Right 2010    torn meniscus    KNEE SURGERY Right 2005    Knee cap    OVARY REMOVAL  are mis-transcribed.)    Ama Jackman MD (electronically signed)            Ama Jackman MD  05/01/25 3125

## 2025-05-01 NOTE — ED TRIAGE NOTES
Pt arrives via EMS from urgent care for LUQ abdominal pain. Reports she was seen Sunday in ED and was told she \"had potential pancreatitis\". Pt reports the pain has not improved, reports nausea, denies diarrhea/vomiting. Reports worsening pain after eating.     Patient oriented to room and ED throughput process.  Safety measures with ED bed locked in lowest position and call light in reach.  Patient educated on all orders, including any medications.  Patient educated on chief complaint/symptoms. Patient encouraged to ask questions regarding care, medications or treatment plan.  Patient aware of how to reach staff with questions/concerns.

## 2025-05-02 PROBLEM — K52.9 COLITIS: Status: ACTIVE | Noted: 2025-05-02

## 2025-05-02 LAB
ALBUMIN SERPL-MCNC: 3.9 G/DL (ref 3.4–5)
ALBUMIN/GLOB SERPL: 1.6 {RATIO}
ALP SERPL-CCNC: 94 U/L (ref 40–129)
ALT SERPL-CCNC: 19 U/L (ref 10–40)
ANION GAP SERPL CALCULATED.3IONS-SCNC: 10 MMOL/L (ref 3–16)
AST SERPL-CCNC: 26 U/L (ref 15–37)
BASOPHILS # BLD: 0.03 K/UL (ref 0–0.2)
BASOPHILS NFR BLD: 0 %
BILIRUB SERPL-MCNC: 0.4 MG/DL (ref 0–1)
BUN SERPL-MCNC: 14 MG/DL (ref 7–20)
CALCIUM SERPL-MCNC: 8 MG/DL (ref 8.3–10.6)
CHLORIDE SERPL-SCNC: 108 MMOL/L (ref 99–110)
CO2 SERPL-SCNC: 21 MMOL/L (ref 21–32)
CREAT SERPL-MCNC: 0.6 MG/DL (ref 0.5–1)
EOSINOPHIL # BLD: 0.09 K/UL (ref 0–0.6)
EOSINOPHILS RELATIVE PERCENT: 1 %
ERYTHROCYTE [DISTWIDTH] IN BLOOD BY AUTOMATED COUNT: 12.3 % (ref 12.4–15.4)
GFR, ESTIMATED: >90 ML/MIN/1.73M2
GLUCOSE SERPL-MCNC: 90 MG/DL (ref 70–99)
HCT VFR BLD AUTO: 38.5 % (ref 36–48)
HGB BLD-MCNC: 13.5 G/DL (ref 12–16)
IMM GRANULOCYTES # BLD AUTO: 0.07 K/UL (ref 0–0.5)
IMM GRANULOCYTES NFR BLD: 1 %
LYMPHOCYTES NFR BLD: 1.74 K/UL (ref 1–5.1)
LYMPHOCYTES RELATIVE PERCENT: 17 %
MCH RBC QN AUTO: 29.6 PG (ref 26–34)
MCHC RBC AUTO-ENTMCNC: 35.1 G/DL (ref 31–36)
MCV RBC AUTO: 84.4 FL (ref 80–100)
MONOCYTES NFR BLD: 0.68 K/UL (ref 0–1.3)
MONOCYTES NFR BLD: 7 %
NEUTROPHILS NFR BLD: 75 %
NEUTS SEG NFR BLD: 7.68 K/UL (ref 1.7–7.7)
PLATELET # BLD AUTO: 235 K/UL (ref 135–450)
PMV BLD AUTO: 8.9 FL (ref 9.4–12.4)
POTASSIUM SERPL-SCNC: 3.7 MMOL/L (ref 3.5–5.1)
PROT SERPL-MCNC: 6.3 G/DL (ref 6.4–8.2)
RBC # BLD AUTO: 4.56 M/UL (ref 4–5.2)
SODIUM SERPL-SCNC: 138 MMOL/L (ref 136–145)
WBC OTHER # BLD: 10.3 K/UL (ref 4–11)

## 2025-05-02 PROCEDURE — 85025 COMPLETE CBC W/AUTO DIFF WBC: CPT

## 2025-05-02 PROCEDURE — 36415 COLL VENOUS BLD VENIPUNCTURE: CPT

## 2025-05-02 PROCEDURE — 6370000000 HC RX 637 (ALT 250 FOR IP): Performed by: FAMILY MEDICINE

## 2025-05-02 PROCEDURE — 96376 TX/PRO/DX INJ SAME DRUG ADON: CPT

## 2025-05-02 PROCEDURE — 2580000003 HC RX 258: Performed by: FAMILY MEDICINE

## 2025-05-02 PROCEDURE — 2580000003 HC RX 258: Performed by: NURSE PRACTITIONER

## 2025-05-02 PROCEDURE — 6370000000 HC RX 637 (ALT 250 FOR IP): Performed by: NURSE PRACTITIONER

## 2025-05-02 PROCEDURE — 1200000000 HC SEMI PRIVATE

## 2025-05-02 PROCEDURE — 80053 COMPREHEN METABOLIC PANEL: CPT

## 2025-05-02 PROCEDURE — 6360000002 HC RX W HCPCS: Performed by: FAMILY MEDICINE

## 2025-05-02 PROCEDURE — 2500000003 HC RX 250 WO HCPCS: Performed by: FAMILY MEDICINE

## 2025-05-02 PROCEDURE — 96361 HYDRATE IV INFUSION ADD-ON: CPT

## 2025-05-02 PROCEDURE — G0378 HOSPITAL OBSERVATION PER HR: HCPCS

## 2025-05-02 RX ORDER — METHOCARBAMOL 750 MG/1
1500 TABLET, FILM COATED ORAL 3 TIMES DAILY PRN
Status: DISCONTINUED | OUTPATIENT
Start: 2025-05-02 | End: 2025-05-03

## 2025-05-02 RX ORDER — KETOROLAC TROMETHAMINE 15 MG/ML
15 INJECTION, SOLUTION INTRAMUSCULAR; INTRAVENOUS EVERY 6 HOURS PRN
Status: DISCONTINUED | OUTPATIENT
Start: 2025-05-02 | End: 2025-05-03 | Stop reason: HOSPADM

## 2025-05-02 RX ORDER — SODIUM CHLORIDE 9 MG/ML
INJECTION, SOLUTION INTRAVENOUS CONTINUOUS
Status: ACTIVE | OUTPATIENT
Start: 2025-05-02 | End: 2025-05-03

## 2025-05-02 RX ADMIN — METOCLOPRAMIDE 10 MG: 5 INJECTION, SOLUTION INTRAMUSCULAR; INTRAVENOUS at 10:30

## 2025-05-02 RX ADMIN — DICYCLOMINE HYDROCHLORIDE 20 MG: 10 CAPSULE ORAL at 16:11

## 2025-05-02 RX ADMIN — Medication 10 ML: at 08:00

## 2025-05-02 RX ADMIN — METHOCARBAMOL 1500 MG: 750 TABLET ORAL at 19:56

## 2025-05-02 RX ADMIN — SODIUM CHLORIDE: 0.9 INJECTION, SOLUTION INTRAVENOUS at 08:53

## 2025-05-02 RX ADMIN — METOCLOPRAMIDE 10 MG: 5 INJECTION, SOLUTION INTRAMUSCULAR; INTRAVENOUS at 05:37

## 2025-05-02 RX ADMIN — DICYCLOMINE HYDROCHLORIDE 20 MG: 10 CAPSULE ORAL at 19:56

## 2025-05-02 RX ADMIN — DICYCLOMINE HYDROCHLORIDE 20 MG: 10 CAPSULE ORAL at 08:00

## 2025-05-02 RX ADMIN — METHOCARBAMOL 1500 MG: 750 TABLET ORAL at 10:31

## 2025-05-02 RX ADMIN — DICYCLOMINE HYDROCHLORIDE 20 MG: 10 CAPSULE ORAL at 12:52

## 2025-05-02 RX ADMIN — METOCLOPRAMIDE 10 MG: 5 INJECTION, SOLUTION INTRAMUSCULAR; INTRAVENOUS at 01:02

## 2025-05-02 RX ADMIN — HYDROMORPHONE HYDROCHLORIDE 0.5 MG: 1 INJECTION, SOLUTION INTRAMUSCULAR; INTRAVENOUS; SUBCUTANEOUS at 01:02

## 2025-05-02 RX ADMIN — SODIUM CHLORIDE: 0.9 INJECTION, SOLUTION INTRAVENOUS at 16:10

## 2025-05-02 ASSESSMENT — PAIN SCALES - WONG BAKER
WONGBAKER_NUMERICALRESPONSE: HURTS LITTLE MORE
WONGBAKER_NUMERICALRESPONSE: HURTS WHOLE LOT

## 2025-05-02 ASSESSMENT — PAIN DESCRIPTION - ORIENTATION
ORIENTATION: LOWER
ORIENTATION: LEFT
ORIENTATION: LEFT

## 2025-05-02 ASSESSMENT — PAIN DESCRIPTION - PAIN TYPE
TYPE: ACUTE PAIN

## 2025-05-02 ASSESSMENT — PAIN SCALES - GENERAL
PAINLEVEL_OUTOF10: 3
PAINLEVEL_OUTOF10: 2
PAINLEVEL_OUTOF10: 7
PAINLEVEL_OUTOF10: 0
PAINLEVEL_OUTOF10: 5

## 2025-05-02 ASSESSMENT — PAIN DESCRIPTION - FREQUENCY
FREQUENCY: INTERMITTENT

## 2025-05-02 ASSESSMENT — PAIN DESCRIPTION - LOCATION
LOCATION: ABDOMEN
LOCATION: RIB CAGE
LOCATION: ABDOMEN
LOCATION: ABDOMEN

## 2025-05-02 ASSESSMENT — PAIN DESCRIPTION - ONSET
ONSET: GRADUAL
ONSET: GRADUAL

## 2025-05-02 ASSESSMENT — PAIN - FUNCTIONAL ASSESSMENT
PAIN_FUNCTIONAL_ASSESSMENT: ACTIVITIES ARE NOT PREVENTED

## 2025-05-02 ASSESSMENT — PAIN DESCRIPTION - DESCRIPTORS
DESCRIPTORS: ACHING
DESCRIPTORS: CRAMPING
DESCRIPTORS: SHARP

## 2025-05-02 NOTE — PROGRESS NOTES
[facility] Medicine Progress Note  V 1.6      Date of Admission: 5/1/2025    Hospital Day: 2      Chief Admission Complaint: Abdominal pain    Subjective: Patient seen and examined at bedside this morning.  She does endorse some improvement of her abdominal pain but states she has not been able to tolerate oral intake she had a couple bites of melon this morning and made her very nauseated but states she did not vomit.  Patient reports she did have a bowel movement and is not having some diarrhea.  Intermittent left abdominal cramping.    Did discuss with patient we will continue to monitor throughout the day likely plan to discharge tomorrow if she begins to tolerate oral intake tonight we will advance her diet as tolerated    Presenting Admission History:       Danette Clark is a 45 y.o. female with pmh of IBS, who presents with abdominal pain        Patient presents with left lower, upper abdominal pain radiates to the back.  Symptoms started 2-3 days ago and have gotten worse.  Patient was sent to emergency room from her primary care provider via EMS provided did not feel safe patient driving in this condition..  On admission was noted patient was doubled over the pain.     Patient states she was diagnosed with IBS while in 6 grade.  Used to be IBS D and now it is a mix between constipation and diarrhea.  She was extensively worked up here on 4/27/2025 and today without any significant finding other than on CT scan today showing moderate amount of stool most like in setting of constipation  Patient continued to have significant amount of abdominal pain prompting admission for pain control, stool evacuation.      Patient was admitted for further evaluation and treatment.    Assessment/Plan:      Current Principal Problem:  Abdominal pain    # Abdominal pain with nausea and vomiting  History of IBS  Appetite change has not been able to tolerate oral intake  - Patient has had 2 visits to the emergency room and 1

## 2025-05-02 NOTE — CARE COORDINATION
Review of chart for any potential discharge needs.  Pt from home with spouse. Pt IPTA. Family can transport pt home.  No needs identified for discharge intervention at this time.   MD and bedside RN, if needs arise please consult case management for discharge intervention.  CM will follow as needed

## 2025-05-03 ENCOUNTER — APPOINTMENT (OUTPATIENT)
Age: 45
DRG: 392 | End: 2025-05-03
Payer: COMMERCIAL

## 2025-05-03 VITALS
BODY MASS INDEX: 24.41 KG/M2 | DIASTOLIC BLOOD PRESSURE: 68 MMHG | SYSTOLIC BLOOD PRESSURE: 113 MMHG | WEIGHT: 137.8 LBS | HEIGHT: 63 IN | OXYGEN SATURATION: 100 % | HEART RATE: 72 BPM | TEMPERATURE: 98.1 F | RESPIRATION RATE: 16 BRPM

## 2025-05-03 LAB
ALBUMIN SERPL-MCNC: 4.2 G/DL (ref 3.4–5)
ALBUMIN/GLOB SERPL: 1.5 {RATIO}
ALP SERPL-CCNC: 100 U/L (ref 40–129)
ALT SERPL-CCNC: 22 U/L (ref 10–40)
ANION GAP SERPL CALCULATED.3IONS-SCNC: 13 MMOL/L (ref 3–16)
ANION GAP SERPL CALCULATED.3IONS-SCNC: 18 MMOL/L (ref 3–16)
AST SERPL-CCNC: 30 U/L (ref 15–37)
BASOPHILS # BLD: 0.02 K/UL (ref 0–0.2)
BASOPHILS # BLD: 0.03 K/UL (ref 0–0.2)
BASOPHILS NFR BLD: 0 %
BASOPHILS NFR BLD: 0 %
BILIRUB SERPL-MCNC: 0.8 MG/DL (ref 0–1)
BUN SERPL-MCNC: 10 MG/DL (ref 7–20)
BUN SERPL-MCNC: 12 MG/DL (ref 7–20)
CALCIUM SERPL-MCNC: 8.5 MG/DL (ref 8.3–10.6)
CALCIUM SERPL-MCNC: 9 MG/DL (ref 8.3–10.6)
CHLORIDE SERPL-SCNC: 106 MMOL/L (ref 99–110)
CHLORIDE SERPL-SCNC: 111 MMOL/L (ref 99–110)
CO2 SERPL-SCNC: 16 MMOL/L (ref 21–32)
CO2 SERPL-SCNC: 20 MMOL/L (ref 21–32)
CREAT SERPL-MCNC: 0.7 MG/DL (ref 0.5–1)
CREAT SERPL-MCNC: 0.7 MG/DL (ref 0.5–1)
EOSINOPHIL # BLD: 0.15 K/UL (ref 0–0.6)
EOSINOPHIL # BLD: 0.21 K/UL (ref 0–0.6)
EOSINOPHILS RELATIVE PERCENT: 1 %
EOSINOPHILS RELATIVE PERCENT: 2 %
ERYTHROCYTE [DISTWIDTH] IN BLOOD BY AUTOMATED COUNT: 12.5 % (ref 12.4–15.4)
ERYTHROCYTE [DISTWIDTH] IN BLOOD BY AUTOMATED COUNT: 12.5 % (ref 12.4–15.4)
GFR, ESTIMATED: >90 ML/MIN/1.73M2
GFR, ESTIMATED: >90 ML/MIN/1.73M2
GLUCOSE SERPL-MCNC: 129 MG/DL (ref 70–99)
GLUCOSE SERPL-MCNC: 69 MG/DL (ref 70–99)
HCT VFR BLD AUTO: 38.2 % (ref 36–48)
HCT VFR BLD AUTO: 40.6 % (ref 36–48)
HGB BLD-MCNC: 13.5 G/DL (ref 12–16)
HGB BLD-MCNC: 14 G/DL (ref 12–16)
IMM GRANULOCYTES # BLD AUTO: 0.02 K/UL (ref 0–0.5)
IMM GRANULOCYTES # BLD AUTO: 0.04 K/UL (ref 0–0.5)
IMM GRANULOCYTES NFR BLD: 0 %
IMM GRANULOCYTES NFR BLD: 0 %
LACTATE BLDV-SCNC: 1.4 MMOL/L (ref 0.4–2)
LYMPHOCYTES NFR BLD: 1.3 K/UL (ref 1–5.1)
LYMPHOCYTES NFR BLD: 1.68 K/UL (ref 1–5.1)
LYMPHOCYTES RELATIVE PERCENT: 15 %
LYMPHOCYTES RELATIVE PERCENT: 9 %
MCH RBC QN AUTO: 29 PG (ref 26–34)
MCH RBC QN AUTO: 29.5 PG (ref 26–34)
MCHC RBC AUTO-ENTMCNC: 34.5 G/DL (ref 31–36)
MCHC RBC AUTO-ENTMCNC: 35.3 G/DL (ref 31–36)
MCV RBC AUTO: 82 FL (ref 80–100)
MCV RBC AUTO: 85.5 FL (ref 80–100)
MONOCYTES NFR BLD: 0.63 K/UL (ref 0–1.3)
MONOCYTES NFR BLD: 0.79 K/UL (ref 0–1.3)
MONOCYTES NFR BLD: 6 %
MONOCYTES NFR BLD: 6 %
NEUTROPHILS NFR BLD: 77 %
NEUTROPHILS NFR BLD: 83 %
NEUTS SEG NFR BLD: 11.85 K/UL (ref 1.7–7.7)
NEUTS SEG NFR BLD: 8.59 K/UL (ref 1.7–7.7)
PLATELET # BLD AUTO: 242 K/UL (ref 135–450)
PLATELET # BLD AUTO: 254 K/UL (ref 135–450)
PMV BLD AUTO: 8.9 FL
PMV BLD AUTO: 8.9 FL (ref 9.4–12.4)
POTASSIUM SERPL-SCNC: 3.8 MMOL/L (ref 3.5–5.1)
POTASSIUM SERPL-SCNC: 3.8 MMOL/L (ref 3.5–5.1)
PROCALCITONIN SERPL-MCNC: <0.02 NG/ML (ref 0–0.15)
PROT SERPL-MCNC: 6.8 G/DL (ref 6.4–8.2)
RBC # BLD AUTO: 4.66 M/UL (ref 4–5.2)
RBC # BLD AUTO: 4.75 M/UL (ref 4–5.2)
SODIUM SERPL-SCNC: 140 MMOL/L (ref 136–145)
SODIUM SERPL-SCNC: 144 MMOL/L (ref 136–145)
WBC OTHER # BLD: 11.1 K/UL (ref 4–11)
WBC OTHER # BLD: 14.2 K/UL (ref 4–11)

## 2025-05-03 PROCEDURE — 80053 COMPREHEN METABOLIC PANEL: CPT

## 2025-05-03 PROCEDURE — 80048 BASIC METABOLIC PNL TOTAL CA: CPT

## 2025-05-03 PROCEDURE — 6360000002 HC RX W HCPCS: Performed by: NURSE PRACTITIONER

## 2025-05-03 PROCEDURE — 74018 RADEX ABDOMEN 1 VIEW: CPT

## 2025-05-03 PROCEDURE — 6360000002 HC RX W HCPCS: Performed by: FAMILY MEDICINE

## 2025-05-03 PROCEDURE — 36415 COLL VENOUS BLD VENIPUNCTURE: CPT

## 2025-05-03 PROCEDURE — 85025 COMPLETE CBC W/AUTO DIFF WBC: CPT

## 2025-05-03 PROCEDURE — 2580000003 HC RX 258: Performed by: NURSE PRACTITIONER

## 2025-05-03 PROCEDURE — 83605 ASSAY OF LACTIC ACID: CPT

## 2025-05-03 PROCEDURE — 6370000000 HC RX 637 (ALT 250 FOR IP): Performed by: FAMILY MEDICINE

## 2025-05-03 PROCEDURE — 84145 PROCALCITONIN (PCT): CPT

## 2025-05-03 RX ORDER — 0.9 % SODIUM CHLORIDE 0.9 %
1000 INTRAVENOUS SOLUTION INTRAVENOUS ONCE
Status: COMPLETED | OUTPATIENT
Start: 2025-05-03 | End: 2025-05-03

## 2025-05-03 RX ORDER — SENNOSIDES A AND B 8.6 MG/1
1 TABLET, FILM COATED ORAL 2 TIMES DAILY PRN
COMMUNITY
Start: 2025-05-03 | End: 2025-05-08

## 2025-05-03 RX ADMIN — DICYCLOMINE HYDROCHLORIDE 20 MG: 10 CAPSULE ORAL at 09:21

## 2025-05-03 RX ADMIN — KETOROLAC TROMETHAMINE 15 MG: 15 INJECTION, SOLUTION INTRAMUSCULAR; INTRAVENOUS at 10:51

## 2025-05-03 RX ADMIN — DICYCLOMINE HYDROCHLORIDE 20 MG: 10 CAPSULE ORAL at 13:35

## 2025-05-03 RX ADMIN — SODIUM CHLORIDE 1000 ML: 0.9 INJECTION, SOLUTION INTRAVENOUS at 09:23

## 2025-05-03 RX ADMIN — METOCLOPRAMIDE 10 MG: 5 INJECTION, SOLUTION INTRAMUSCULAR; INTRAVENOUS at 10:51

## 2025-05-03 RX ADMIN — DICYCLOMINE HYDROCHLORIDE 20 MG: 10 CAPSULE ORAL at 17:19

## 2025-05-03 ASSESSMENT — PAIN DESCRIPTION - DESCRIPTORS: DESCRIPTORS: CRUSHING;SHARP

## 2025-05-03 ASSESSMENT — PAIN SCALES - GENERAL
PAINLEVEL_OUTOF10: 3
PAINLEVEL_OUTOF10: 6

## 2025-05-03 ASSESSMENT — PAIN DESCRIPTION - LOCATION: LOCATION: ABDOMEN

## 2025-05-03 ASSESSMENT — PAIN DESCRIPTION - ORIENTATION: ORIENTATION: MID

## 2025-05-03 NOTE — DISCHARGE SUMMARY
Hospital Medicine Discharge Summary    Patient ID: Danette Clark      Patient's PCP: Rojelio Lee MD    Admit Date: 5/1/2025     Discharge Date:   5/3/2025    Admitting Physician: Grover Toscano MD     Discharge Physician: FLORY Roblero - CNP     Discharge Diagnoses  Abdominal pain result  Nausea and vomiting resolved  IBS with mixed  Anxiety    Hospital Course: Danette Clark is a 45 y.o. female with pmh of IBS, who presents with abdominal pain        Patient presents with left lower, upper abdominal pain radiates to the back.  Symptoms started 2-3 days ago and have gotten worse.  Patient was sent to emergency room from her primary care provider via EMS provided did not feel safe patient driving in this condition..  On admission was noted patient was doubled over the pain.     Patient states she was diagnosed with IBS while in 6 grade.  Used to be IBS D and now it is a mix between constipation and diarrhea.  She was extensively worked up here on 4/27/2025 and today without any significant finding other than on CT scan today showing moderate amount of stool most like in setting of constipation  She was admitted to the hospital for further evaluation and treatment.  See details below    # Abdominal pain with nausea and vomiting  History of IBS-has had diarrhea in the past has been mixed with constipation and diarrhea  Appetite change has not been able to tolerate oral intake  - Patient has had 2 visits to the emergency room and 1 visit to her primary care in the past 2 to 3 days.  Has been having left lower and upper quadrant abdominal pain for the last few days has gotten worse  Her last colonoscopy 5/31/2024  CT abdomen pelvis shows large stool burden suggesting constipation  Patient was admitted to the hospital for intractable abdominal pain unable to tolerate oral intake.    After administration of mag citrate patient had a large bowel movement    Patient was evaluated by GI prior to

## 2025-05-03 NOTE — FLOWSHEET NOTE
Patient with discharge orders. Patient IV removed. Patient packed personal belongings without assistance. Patient provided discharge instructions and given opportunity to ask questions. Patient to discharge home with family. Patient ambulated to main entrance discharge completed.

## 2025-05-03 NOTE — PROGRESS NOTES
[facility] Medicine Progress Note  V 1.6      Date of Admission: 5/1/2025    Hospital Day: 3      Chief Admission Complaint: Abdominal pain    Subjective: ***    Presenting Admission History:       Danette Clark is a 45 y.o. female with pmh of IBS, who presents with abdominal pain        Patient presents with left lower, upper abdominal pain radiates to the back.  Symptoms started 2-3 days ago and have gotten worse.  Patient was sent to emergency room from her primary care provider via EMS provided did not feel safe patient driving in this condition..  On admission was noted patient was doubled over the pain.     Patient states she was diagnosed with IBS while in 6 grade.  Used to be IBS D and now it is a mix between constipation and diarrhea.  She was extensively worked up here on 4/27/2025 and today without any significant finding other than on CT scan today showing moderate amount of stool most like in setting of constipation  Patient continued to have significant amount of abdominal pain prompting admission for pain control, stool evacuation.      Patient was admitted for further evaluation and treatment.    Assessment/Plan:      Current Principal Problem:  Abdominal pain    # Abdominal pain with nausea and vomiting  History of IBS  Appetite change has not been able to tolerate oral intake  - Patient has had 2 visits to the emergency room and 1 visit to her primary care in the past 2 to 3 days.  Has been having left lower and upper quadrant abdominal pain for the last few days has gotten worse  Her last colonoscopy 5/31/2024  CT abdomen pelvis shows large stool burden suggesting constipation  - Observation upgrade to inpatient due to unable to tolerate oral intake and continued IV fluids  - Mag citrate on admission  - Enema as needed  -Bentyl 20 mg 4 times a day  - Antiemetic with Reglan-  - Clear liquid diet advance as tolerated    # Anxiety  Continue home dose Xanax  O[facility] report reviewed    Ongoing threat

## 2025-05-03 NOTE — PLAN OF CARE
Problem: Discharge Planning  Goal: Discharge to home or other facility with appropriate resources  5/3/2025 1709 by Anabel Dalton RN  Outcome: Completed     Problem: Pain  Goal: Verbalizes/displays adequate comfort level or baseline comfort level  5/3/2025 1709 by Anabel Dalton RN  Outcome: Completed     Problem: Safety - Adult  Goal: Free from fall injury  5/3/2025 1709 by Anabel Dalton RN  Outcome: Completed     Problem: Gastrointestinal - Adult  Goal: Minimal or absence of nausea and vomiting  5/3/2025 1709 by Anabel Dalton RN  Outcome: Completed     Problem: Gastrointestinal - Adult  Goal: Maintains or returns to baseline bowel function  5/3/2025 1709 by Anabel Dalton RN  Outcome: Completed

## 2025-05-03 NOTE — CONSULTS
Gastroenterology H&P/Consult Note    Patient: Danette Clark CSN: [id number]     YOB: 1980  Age: 45 y.o.  Sex: female    Unit: [facility] MED/SURG Room/Bed: 4204/4204-01 Location: [facility]     Admitting Physician: BACILIO MISHRA    Date of  Admission: 5/1/2025   Admission type: Emergency  Primary Care Physician: Rojelio Lee MD         Chief Complaint: abdominal pain  Consult Date: 5/3/2025     Subjective:     History of Present Illness: Danette Clark is a 45 y.o. female who is seen at the request of BACILIO MISHRA for IBS constipation.    45-year-old female with medical history of Anxiety and IBS constipation admitted on 5/1/2025 for lower abdominal pain of about 3 days duration. Pain is cramping, intermittent and non radiating. Associated with small volume constipation. Denies nausea, vomiting, fever, chills, diarrhea, hematochezia or melenic stools.     Labs on presentation 5/1/2025 reviewed with unremarkable BMP, liver test and CBC.    CT abdomen and pelvis with contrast 5/2/2025 noted no acute process in the abdomen or pelvis.  Large stool burden suggesting constipation.    Labs 5/3/2025 reviewed with unremarkable BMP, liver test and CBC except for mild leukocytosis with WBC 14.2 with left shift.    X-ray abdomen 5/3/2025 nonobstructive bowel gas pattern. No stool burden.     Last Colonoscopy 5/31/24 Dr. Lomeli: Normal colonoscopy to the terminal ileum.    Past Medical History:   Diagnosis Date    ADHD (attention deficit hyperactivity disorder) 2023    Anxiety     Arthritis     Bipolar affective disorder (HCC)     Borderline personality disorder (HCC)     Depression     bipolar disorder    Headache     IBS (irritable bowel syndrome)     Neck pain     Post hysterectomy menopause syndrome      Past Surgical History:   Procedure Laterality Date    ACHILLES TENDON SURGERY Right 11/01/2023    APPENDECTOMY  2007    CHOLECYSTECTOMY  2007    HYSTERECTOMY, VAGINAL      KNEE

## 2025-05-03 NOTE — PLAN OF CARE
Problem: Discharge Planning  Goal: Discharge to home or other facility with appropriate resources  Outcome: Progressing  Flowsheets (Taken 5/3/2025 1227)  Discharge to home or other facility with appropriate resources: Identify barriers to discharge with patient and caregiver     Problem: Pain  Goal: Verbalizes/displays adequate comfort level or baseline comfort level  Outcome: Progressing  Flowsheets (Taken 5/3/2025 1227)  Verbalizes/displays adequate comfort level or baseline comfort level: Encourage patient to monitor pain and request assistance     Problem: Safety - Adult  Goal: Free from fall injury  Outcome: Progressing  Flowsheets (Taken 5/3/2025 1227)  Free From Fall Injury:   Instruct family/caregiver on patient safety   Based on caregiver fall risk screen, instruct family/caregiver to ask for assistance with transferring infant if caregiver noted to have fall risk factors     Problem: Gastrointestinal - Adult  Goal: Minimal or absence of nausea and vomiting  Outcome: Progressing  Flowsheets (Taken 5/3/2025 1227)  Minimal or absence of nausea and vomiting:   Advance diet as tolerated, if ordered   Provide nonpharmacologic comfort measures as appropriate     Problem: Gastrointestinal - Adult  Goal: Maintains or returns to baseline bowel function  Outcome: Progressing  Flowsheets (Taken 5/3/2025 1227)  Maintains or returns to baseline bowel function:   Assess bowel function   Encourage oral fluids to ensure adequate hydration   Encourage mobilization and activity   Administer ordered medications as needed

## 2025-05-05 ENCOUNTER — TELEPHONE (OUTPATIENT)
Dept: PRIMARY CARE CLINIC | Age: 45
End: 2025-05-05

## 2025-05-05 NOTE — TELEPHONE ENCOUNTER
Care Transitions Initial Follow Up Call    Outreach made within 2 business days of discharge: Yes    Patient: Danette Clark Patient : 1980   MRN: [id number]  Reason for Admission: abdominal pain  Discharge Date: 5/3/25       Spoke with: Patient    Discharge department/facility: [facility] Interactive Patient Contact:  Was patient able to fill all prescriptions: Yes  Was patient instructed to bring all medications to the follow-up visit: Yes  Is patient taking all medications as directed in the discharge summary? Yes  Does patient understand their discharge instructions: Yes  Does patient have questions or concerns that need addressed prior to 7-14 day follow up office visit: no    Additional needs identified to be addressed with provider  No needs identified             Scheduled appointment with PCP within 7-14 days    Pt will call back and schedule this week.    Follow Up  No future appointments.    Yvrose Marte MA

## 2025-06-06 RX ORDER — ATOGEPANT 60 MG/1
1 TABLET ORAL DAILY
Qty: 30 TABLET | Refills: 5 | Status: SHIPPED | OUTPATIENT
Start: 2025-06-06

## 2025-06-06 NOTE — TELEPHONE ENCOUNTER
Medication:   Requested Prescriptions     Pending Prescriptions Disp Refills    QULIPTA 60 MG TABS [Pharmacy Med Name: QULIPTA 60MG TABLETS] 30 tablet 5     Sig: TAKE 1 TABLET BY MOUTH EVERY DAY     Last Filled:  12.5.24    Last appt: 5/1/2025   Next appt: Visit date not found    Last OARRS:        No data to display

## 2025-06-16 ENCOUNTER — OFFICE VISIT (OUTPATIENT)
Dept: PRIMARY CARE CLINIC | Age: 45
End: 2025-06-16
Payer: COMMERCIAL

## 2025-06-16 VITALS
SYSTOLIC BLOOD PRESSURE: 102 MMHG | OXYGEN SATURATION: 98 % | WEIGHT: 148 LBS | HEART RATE: 76 BPM | HEIGHT: 63 IN | DIASTOLIC BLOOD PRESSURE: 60 MMHG | BODY MASS INDEX: 26.22 KG/M2

## 2025-06-16 DIAGNOSIS — E04.1 THYROID NODULE: Primary | ICD-10-CM

## 2025-06-16 DIAGNOSIS — E04.1 THYROID NODULE: ICD-10-CM

## 2025-06-16 LAB
T3FREE SERPL-MCNC: 2.8 PG/ML (ref 2.3–4.2)
T4 FREE SERPL-MCNC: 1 NG/DL (ref 0.9–1.8)
TSH SERPL DL<=0.005 MIU/L-ACNC: 1.42 UIU/ML (ref 0.27–4.2)

## 2025-06-16 PROCEDURE — 99213 OFFICE O/P EST LOW 20 MIN: CPT | Performed by: FAMILY MEDICINE

## 2025-06-16 RX ORDER — SACCHAROMYCES BOULARDII 250 MG
CAPSULE ORAL
COMMUNITY

## 2025-06-16 RX ORDER — B-COMPLEX WITH VITAMIN C
1 TABLET ORAL
COMMUNITY

## 2025-06-16 RX ORDER — PROGESTERONE 100 MG/1
CAPSULE ORAL
COMMUNITY

## 2025-06-16 RX ORDER — LINACLOTIDE 145 UG/1
CAPSULE, GELATIN COATED ORAL
COMMUNITY
Start: 2025-06-10

## 2025-06-16 RX ORDER — VILAZODONE HYDROCHLORIDE 20 MG/1
TABLET ORAL
COMMUNITY

## 2025-06-16 ASSESSMENT — ENCOUNTER SYMPTOMS
RESPIRATORY NEGATIVE: 1
EYES NEGATIVE: 1
GASTROINTESTINAL NEGATIVE: 1

## 2025-06-16 NOTE — PROGRESS NOTES
breath sounds. No wheezing or rales.   Abdominal:      General: Bowel sounds are normal. There is no distension.      Palpations: Abdomen is soft. There is no mass.      Tenderness: There is no abdominal tenderness.      Hernia: No hernia is present.   Lymphadenopathy:      Cervical: No cervical adenopathy.   Skin:     Findings: No rash.   Neurological:      Mental Status: She is alert and oriented to person, place, and time.         ASSESSMENT:    1. Thyroid nodule  -     US THYROID; Future  -     TSH; Future  -     T4, Free; Future  -     T3, Free; Future        PLAN:    See orders  Will obtain the MRI record

## 2025-06-17 ENCOUNTER — RESULTS FOLLOW-UP (OUTPATIENT)
Dept: PRIMARY CARE CLINIC | Age: 45
End: 2025-06-17

## 2025-06-17 DIAGNOSIS — E04.1 THYROID NODULE: Primary | ICD-10-CM

## 2025-06-23 DIAGNOSIS — G43.819 OTHER MIGRAINE WITHOUT STATUS MIGRAINOSUS, INTRACTABLE: ICD-10-CM

## 2025-06-23 RX ORDER — RIZATRIPTAN BENZOATE 10 MG/1
10 TABLET ORAL
Qty: 12 TABLET | Refills: 2 | Status: SHIPPED | OUTPATIENT
Start: 2025-06-23

## 2025-06-23 NOTE — TELEPHONE ENCOUNTER
Medication:   Requested Prescriptions     Pending Prescriptions Disp Refills    rizatriptan (MAXALT) 10 MG tablet 12 tablet 2     Sig: Take 1 tablet by mouth once as needed for Migraine May repeat in 2 hours if needed     Last Filled:  12.2.24    Last appt: 6/16/2025   Next appt: Visit date not found    Last OARRS:        No data to display

## 2025-06-24 ENCOUNTER — HOSPITAL ENCOUNTER (OUTPATIENT)
Dept: ULTRASOUND IMAGING | Age: 45
Discharge: HOME | End: 2025-06-24
Payer: COMMERCIAL

## 2025-06-24 DIAGNOSIS — E04.1 THYROID NODULE: ICD-10-CM

## 2025-06-24 PROCEDURE — 76536 US EXAM OF HEAD AND NECK: CPT

## 2025-06-25 ENCOUNTER — OFFICE VISIT (OUTPATIENT)
Dept: PRIMARY CARE CLINIC | Age: 45
End: 2025-06-25
Payer: COMMERCIAL

## 2025-06-25 VITALS
HEART RATE: 75 BPM | SYSTOLIC BLOOD PRESSURE: 126 MMHG | WEIGHT: 148 LBS | BODY MASS INDEX: 26.22 KG/M2 | OXYGEN SATURATION: 99 % | DIASTOLIC BLOOD PRESSURE: 86 MMHG | TEMPERATURE: 97.8 F

## 2025-06-25 DIAGNOSIS — M54.16 LUMBAR RADICULOPATHY: Primary | ICD-10-CM

## 2025-06-25 DIAGNOSIS — E04.1 THYROID NODULE: ICD-10-CM

## 2025-06-25 PROCEDURE — 99214 OFFICE O/P EST MOD 30 MIN: CPT | Performed by: NURSE PRACTITIONER

## 2025-06-25 RX ORDER — METHYLPREDNISOLONE 4 MG/1
TABLET ORAL
Qty: 1 KIT | Refills: 0 | Status: SHIPPED | OUTPATIENT
Start: 2025-06-25

## 2025-06-25 RX ORDER — CYCLOBENZAPRINE HCL 10 MG
10 TABLET ORAL 3 TIMES DAILY PRN
Qty: 30 TABLET | Refills: 0 | Status: SHIPPED | OUTPATIENT
Start: 2025-06-25 | End: 2025-07-10

## 2025-06-25 ASSESSMENT — ENCOUNTER SYMPTOMS
WHEEZING: 0
COUGH: 0
DIARRHEA: 0
VOMITING: 0
NAUSEA: 0
BACK PAIN: 1
SHORTNESS OF BREATH: 0

## 2025-06-25 NOTE — PROGRESS NOTES
Prior to Visit   Medication Sig Dispense Refill    rizatriptan (MAXALT) 10 MG tablet Take 1 tablet by mouth once as needed for Migraine May repeat in 2 hours if needed 12 tablet 2    LINZESS 145 MCG capsule 1 capsule at least 30 minutes before the first meal of the day on an empty stomach Orally Once a day for 90 days      vilazodone HCl (VIIBRYD) 20 MG TABS 1 tablet with food Orally Once a day      Zinc 100 MG TABS Take 1 tablet by mouth      saccharomyces boulardii (FLORASTOR) 250 MG capsule Take by mouth      progesterone (PROMETRIUM) 100 MG CAPS capsule 1 capsule at bedtime Orally Once a day      QULIPTA 60 MG TABS TAKE 1 TABLET BY MOUTH EVERY DAY 30 tablet 5    gabapentin (NEURONTIN) 600 MG tablet Take 1 tablet by mouth 3 times daily.      Magnesium 400 MG TABS Take 400 mg by mouth daily At night       No current facility-administered medications on file prior to visit.      Social History     Tobacco Use    Smoking status: Never     Passive exposure: Never    Smokeless tobacco: Never   Substance Use Topics    Alcohol use: No        CARE TEAM  Patient Care Team:  Rojelio Lee MD as PCP - General (Family Medicine)  Rojelio Lee MD as PCP - Empaneled Provider    Electronically signed by FLORY Pena CNP on 6/25/2025 at 3:56 PM     This dictation was generated by voice recognition computer software.  Although all attempts are made to edit the dictation for accuracy, there may be errors in the transcription that are not intended.

## 2025-06-30 ENCOUNTER — HOSPITAL ENCOUNTER (OUTPATIENT)
Age: 45
Discharge: HOME | End: 2025-06-30
Payer: COMMERCIAL

## 2025-06-30 VITALS — DIASTOLIC BLOOD PRESSURE: 76 MMHG | SYSTOLIC BLOOD PRESSURE: 113 MMHG | HEART RATE: 73 BPM

## 2025-06-30 DIAGNOSIS — E04.1 THYROID NODULE: ICD-10-CM

## 2025-06-30 PROCEDURE — 10005 FNA BX W/US GDN 1ST LES: CPT

## 2025-06-30 PROCEDURE — 88173 CYTOPATH EVAL FNA REPORT: CPT

## 2025-06-30 PROCEDURE — 88305 TISSUE EXAM BY PATHOLOGIST: CPT

## 2025-07-02 ENCOUNTER — RESULTS FOLLOW-UP (OUTPATIENT)
Dept: PRIMARY CARE CLINIC | Age: 45
End: 2025-07-02

## 2025-07-02 LAB — NON-GYN CYTOLOGY REPORT: NORMAL

## 2025-07-09 ENCOUNTER — TELEPHONE (OUTPATIENT)
Dept: PRIMARY CARE CLINIC | Age: 45
End: 2025-07-09

## 2025-07-09 ENCOUNTER — PATIENT MESSAGE (OUTPATIENT)
Dept: PRIMARY CARE CLINIC | Age: 45
End: 2025-07-09

## 2025-07-09 NOTE — TELEPHONE ENCOUNTER
Prior Auth request for Patient Qulipta pt has new insurance. New insurance uploaded  and in chart

## 2025-07-11 NOTE — TELEPHONE ENCOUNTER
Submitted PA for Qulipta 60MG tablets   [facility] Key: [id number] STATUS: PENDING.    Follow up done daily; if no decision with in three days we will refax.  If another three days goes by with no decision will call the insurance for status.

## 2025-07-14 NOTE — TELEPHONE ENCOUNTER
The medication Qulipta 60MG tablets  is APPROVED from 7/11/25 to 7/11/26.    Please notify the patient.    If this requires a response please respond to the pool ( P [facility] MEDICATION PRE-AUTH).

## 2025-07-18 ENCOUNTER — PATIENT MESSAGE (OUTPATIENT)
Dept: PRIMARY CARE CLINIC | Age: 45
End: 2025-07-18

## 2025-08-17 SDOH — HEALTH STABILITY: PHYSICAL HEALTH: ON AVERAGE, HOW MANY MINUTES DO YOU ENGAGE IN EXERCISE AT THIS LEVEL?: 10 MIN

## 2025-08-17 SDOH — HEALTH STABILITY: PHYSICAL HEALTH: ON AVERAGE, HOW MANY DAYS PER WEEK DO YOU ENGAGE IN MODERATE TO STRENUOUS EXERCISE (LIKE A BRISK WALK)?: 2 DAYS

## 2025-08-20 ENCOUNTER — OFFICE VISIT (OUTPATIENT)
Dept: PRIMARY CARE CLINIC | Age: 45
End: 2025-08-20
Payer: COMMERCIAL

## 2025-08-20 VITALS
OXYGEN SATURATION: 98 % | SYSTOLIC BLOOD PRESSURE: 110 MMHG | BODY MASS INDEX: 27.99 KG/M2 | DIASTOLIC BLOOD PRESSURE: 84 MMHG | TEMPERATURE: 97.9 F | HEART RATE: 87 BPM | WEIGHT: 158 LBS

## 2025-08-20 DIAGNOSIS — R10.823 RIGHT LOWER QUADRANT ABDOMINAL TENDERNESS WITH REBOUND TENDERNESS: ICD-10-CM

## 2025-08-20 DIAGNOSIS — Z76.89 ENCOUNTER TO ESTABLISH CARE: ICD-10-CM

## 2025-08-20 DIAGNOSIS — R42 DIZZINESS: ICD-10-CM

## 2025-08-20 DIAGNOSIS — R10.9 FLANK PAIN: Primary | ICD-10-CM

## 2025-08-20 DIAGNOSIS — H00.015 HORDEOLUM EXTERNUM OF LEFT LOWER EYELID: ICD-10-CM

## 2025-08-20 DIAGNOSIS — L30.9 ECZEMA, UNSPECIFIED TYPE: ICD-10-CM

## 2025-08-20 DIAGNOSIS — R31.9 HEMATURIA, UNSPECIFIED TYPE: ICD-10-CM

## 2025-08-20 PROBLEM — K59.09 CHRONIC CONSTIPATION: Status: ACTIVE | Noted: 2025-08-20

## 2025-08-20 LAB
BILIRUBIN, POC: NORMAL
BLOOD URINE, POC: NORMAL
CLARITY, POC: CLEAR
COLOR, POC: YELLOW
GLUCOSE URINE, POC: NORMAL MG/DL
KETONES, POC: NORMAL MG/DL
LEUKOCYTE EST, POC: NORMAL
NITRITE, POC: NORMAL
PH, POC: 7
PROTEIN, POC: NORMAL MG/DL
SPECIFIC GRAVITY, POC: 1.02
UROBILINOGEN, POC: 4 MG/DL

## 2025-08-20 PROCEDURE — 1036F TOBACCO NON-USER: CPT | Performed by: NURSE PRACTITIONER

## 2025-08-20 PROCEDURE — G8427 DOCREV CUR MEDS BY ELIG CLIN: HCPCS | Performed by: NURSE PRACTITIONER

## 2025-08-20 PROCEDURE — 81002 URINALYSIS NONAUTO W/O SCOPE: CPT | Performed by: NURSE PRACTITIONER

## 2025-08-20 PROCEDURE — G8419 CALC BMI OUT NRM PARAM NOF/U: HCPCS | Performed by: NURSE PRACTITIONER

## 2025-08-20 PROCEDURE — 99214 OFFICE O/P EST MOD 30 MIN: CPT | Performed by: NURSE PRACTITIONER

## 2025-08-20 RX ORDER — ALPRAZOLAM 0.25 MG
0.25 TABLET ORAL PRN
COMMUNITY
Start: 2025-06-20

## 2025-08-20 RX ORDER — BACITRACIN ZINC AND POLYMYXIN B SULFATE 500; 10000 [USP'U]/G; [USP'U]/G
OINTMENT OPHTHALMIC 2 TIMES DAILY
Qty: 3.5 G | Refills: 0 | Status: SHIPPED | OUTPATIENT
Start: 2025-08-20 | End: 2025-08-30

## 2025-08-20 ASSESSMENT — ENCOUNTER SYMPTOMS
NAUSEA: 0
VOMITING: 0
BACK PAIN: 1
WHEEZING: 0
CONSTIPATION: 1
COUGH: 0
DIARRHEA: 0
SHORTNESS OF BREATH: 0
ABDOMINAL PAIN: 1

## 2025-08-21 ENCOUNTER — RESULTS FOLLOW-UP (OUTPATIENT)
Dept: PRIMARY CARE CLINIC | Age: 45
End: 2025-08-21

## 2025-08-21 ENCOUNTER — TELEPHONE (OUTPATIENT)
Dept: PRIMARY CARE CLINIC | Age: 45
End: 2025-08-21

## 2025-08-21 ENCOUNTER — HOSPITAL ENCOUNTER (OUTPATIENT)
Dept: ULTRASOUND IMAGING | Age: 45
Discharge: HOME | End: 2025-08-21
Payer: COMMERCIAL

## 2025-08-21 DIAGNOSIS — R31.9 HEMATURIA, UNSPECIFIED TYPE: ICD-10-CM

## 2025-08-21 DIAGNOSIS — R42 DIZZINESS: ICD-10-CM

## 2025-08-21 DIAGNOSIS — R10.823 RIGHT LOWER QUADRANT ABDOMINAL TENDERNESS WITH REBOUND TENDERNESS: ICD-10-CM

## 2025-08-21 DIAGNOSIS — R10.9 FLANK PAIN: ICD-10-CM

## 2025-08-21 LAB
ALBUMIN SERPL-MCNC: 4.6 G/DL (ref 3.4–5)
ALBUMIN/GLOB SERPL: 2 {RATIO} (ref 1.1–2.2)
ALP SERPL-CCNC: 81 U/L (ref 40–129)
ALT SERPL-CCNC: 29 U/L (ref 10–40)
ANION GAP SERPL CALCULATED.3IONS-SCNC: 12 MMOL/L (ref 3–16)
AST SERPL-CCNC: 25 U/L (ref 15–37)
BASOPHILS # BLD: 0 K/UL (ref 0–0.2)
BASOPHILS NFR BLD: 0.6 %
BILIRUB SERPL-MCNC: 0.8 MG/DL (ref 0–1)
BUN SERPL-MCNC: 15 MG/DL (ref 7–20)
CALCIUM SERPL-MCNC: 9.6 MG/DL (ref 8.3–10.6)
CHLORIDE SERPL-SCNC: 102 MMOL/L (ref 99–110)
CO2 SERPL-SCNC: 26 MMOL/L (ref 21–32)
CREAT SERPL-MCNC: 0.7 MG/DL (ref 0.6–1.1)
DEPRECATED RDW RBC AUTO: 13.6 % (ref 12.4–15.4)
EOSINOPHIL # BLD: 0.2 K/UL (ref 0–0.6)
EOSINOPHIL NFR BLD: 2.6 %
GFR SERPLBLD CREATININE-BSD FMLA CKD-EPI: >90 ML/MIN/{1.73_M2}
GLUCOSE SERPL-MCNC: 74 MG/DL (ref 70–99)
HCT VFR BLD AUTO: 43 % (ref 36–48)
HGB BLD-MCNC: 15.2 G/DL (ref 12–16)
LYMPHOCYTES # BLD: 1.6 K/UL (ref 1–5.1)
LYMPHOCYTES NFR BLD: 20.7 %
MCH RBC QN AUTO: 29.9 PG (ref 26–34)
MCHC RBC AUTO-ENTMCNC: 35.3 G/DL (ref 31–36)
MCV RBC AUTO: 84.6 FL (ref 80–100)
MONOCYTES # BLD: 0.7 K/UL (ref 0–1.3)
MONOCYTES NFR BLD: 8.7 %
NEUTROPHILS # BLD: 5.3 K/UL (ref 1.7–7.7)
NEUTROPHILS NFR BLD: 67.4 %
PLATELET # BLD AUTO: 234 K/UL (ref 135–450)
PMV BLD AUTO: 7.6 FL (ref 5–10.5)
POTASSIUM SERPL-SCNC: 4.4 MMOL/L (ref 3.5–5.1)
PROT SERPL-MCNC: 6.9 G/DL (ref 6.4–8.2)
RBC # BLD AUTO: 5.08 M/UL (ref 4–5.2)
SODIUM SERPL-SCNC: 140 MMOL/L (ref 136–145)
WBC # BLD AUTO: 7.8 K/UL (ref 4–11)

## 2025-08-21 PROCEDURE — 76770 US EXAM ABDO BACK WALL COMP: CPT

## 2025-08-22 ENCOUNTER — HOSPITAL ENCOUNTER (EMERGENCY)
Age: 45
Discharge: HOME | End: 2025-08-22
Attending: EMERGENCY MEDICINE
Payer: COMMERCIAL

## 2025-08-22 ENCOUNTER — APPOINTMENT (OUTPATIENT)
Age: 45
End: 2025-08-22
Payer: COMMERCIAL

## 2025-08-22 ENCOUNTER — PATIENT MESSAGE (OUTPATIENT)
Dept: PRIMARY CARE CLINIC | Age: 45
End: 2025-08-22

## 2025-08-22 VITALS
TEMPERATURE: 97.9 F | HEART RATE: 91 BPM | RESPIRATION RATE: 14 BRPM | DIASTOLIC BLOOD PRESSURE: 66 MMHG | WEIGHT: 158 LBS | HEIGHT: 63 IN | OXYGEN SATURATION: 97 % | SYSTOLIC BLOOD PRESSURE: 118 MMHG | BODY MASS INDEX: 28 KG/M2

## 2025-08-22 DIAGNOSIS — I31.39 PERICARDIAL EFFUSION: ICD-10-CM

## 2025-08-22 DIAGNOSIS — R10.9 FLANK PAIN: Primary | ICD-10-CM

## 2025-08-22 LAB
ALBUMIN SERPL-MCNC: 5 G/DL (ref 3.4–5)
ALBUMIN/GLOB SERPL: 1.7 {RATIO}
ALP SERPL-CCNC: 94 U/L (ref 40–129)
ALT SERPL-CCNC: 30 U/L (ref 10–40)
ANION GAP SERPL CALCULATED.3IONS-SCNC: 15 MMOL/L (ref 3–16)
AST SERPL-CCNC: 32 U/L (ref 15–37)
BASOPHILS # BLD: 0.03 K/UL (ref 0–0.2)
BASOPHILS NFR BLD: 0 %
BILIRUB DIRECT SERPL-MCNC: <0.2 MG/DL (ref 0–0.3)
BILIRUB INDIRECT SERPL-MCNC: NORMAL MG/DL (ref 0–1)
BILIRUB SERPL-MCNC: 0.8 MG/DL (ref 0–1)
BILIRUB UR QL STRIP: NEGATIVE
BUN SERPL-MCNC: 14 MG/DL (ref 7–20)
CALCIUM SERPL-MCNC: 9.5 MG/DL (ref 8.3–10.6)
CHLORIDE SERPL-SCNC: 100 MMOL/L (ref 99–110)
CLARITY UR: CLEAR
CO2 SERPL-SCNC: 23 MMOL/L (ref 21–32)
COLOR UR: YELLOW
COMMENT: ABNORMAL
CREAT SERPL-MCNC: 1 MG/DL (ref 0.5–1)
EOSINOPHIL # BLD: 0.15 K/UL (ref 0–0.6)
EOSINOPHILS RELATIVE PERCENT: 2 %
ERYTHROCYTE [DISTWIDTH] IN BLOOD BY AUTOMATED COUNT: 12.1 % (ref 12.4–15.4)
GFR, ESTIMATED: 76 ML/MIN/1.73M2
GLUCOSE SERPL-MCNC: 90 MG/DL (ref 70–99)
GLUCOSE UR STRIP-MCNC: NEGATIVE MG/DL
HCG UR QL: NEGATIVE
HCT VFR BLD AUTO: 51.2 % (ref 36–48)
HGB BLD-MCNC: 17.4 G/DL (ref 12–16)
HGB UR QL STRIP.AUTO: NEGATIVE
IMM GRANULOCYTES # BLD AUTO: 0.02 K/UL (ref 0–0.5)
IMM GRANULOCYTES NFR BLD: 0 %
KETONES UR STRIP-MCNC: ABNORMAL MG/DL
LEUKOCYTE ESTERASE UR QL STRIP: NEGATIVE
LIPASE SERPL-CCNC: 29 U/L (ref 13–60)
LYMPHOCYTES NFR BLD: 2.49 K/UL (ref 1–5.1)
LYMPHOCYTES RELATIVE PERCENT: 26 %
MCH RBC QN AUTO: 29.1 PG (ref 26–34)
MCHC RBC AUTO-ENTMCNC: 34 G/DL (ref 31–36)
MCV RBC AUTO: 85.6 FL (ref 80–100)
MONOCYTES NFR BLD: 0.69 K/UL (ref 0–1.3)
MONOCYTES NFR BLD: 7 %
NEUTROPHILS NFR BLD: 65 %
NEUTS SEG NFR BLD: 6.35 K/UL (ref 1.7–7.7)
NITRITE UR QL STRIP: NEGATIVE
PH UR STRIP: 7.5 [PH] (ref 5–8)
PLATELET # BLD AUTO: 283 K/UL (ref 135–450)
PMV BLD AUTO: 8.8 FL
POTASSIUM SERPL-SCNC: 3.9 MMOL/L (ref 3.5–5.1)
PROT SERPL-MCNC: 7.9 G/DL (ref 6.4–8.2)
PROT UR STRIP-MCNC: NEGATIVE MG/DL
RBC # BLD AUTO: 5.98 M/UL (ref 4–5.2)
SODIUM SERPL-SCNC: 138 MMOL/L (ref 136–145)
SP GR UR STRIP: 1.01 (ref 1–1.03)
UROBILINOGEN UR STRIP-ACNC: 4 EU/DL (ref 0–1)
WBC OTHER # BLD: 9.7 K/UL (ref 4–11)

## 2025-08-22 PROCEDURE — 99284 EMERGENCY DEPT VISIT MOD MDM: CPT

## 2025-08-22 PROCEDURE — 82248 BILIRUBIN DIRECT: CPT

## 2025-08-22 PROCEDURE — 85025 COMPLETE CBC W/AUTO DIFF WBC: CPT

## 2025-08-22 PROCEDURE — 96374 THER/PROPH/DIAG INJ IV PUSH: CPT

## 2025-08-22 PROCEDURE — 80053 COMPREHEN METABOLIC PANEL: CPT

## 2025-08-22 PROCEDURE — 74176 CT ABD & PELVIS W/O CONTRAST: CPT

## 2025-08-22 PROCEDURE — 6360000002 HC RX W HCPCS: Performed by: EMERGENCY MEDICINE

## 2025-08-22 PROCEDURE — 2500000003 HC RX 250 WO HCPCS: Performed by: EMERGENCY MEDICINE

## 2025-08-22 PROCEDURE — 96375 TX/PRO/DX INJ NEW DRUG ADDON: CPT

## 2025-08-22 PROCEDURE — 6370000000 HC RX 637 (ALT 250 FOR IP): Performed by: EMERGENCY MEDICINE

## 2025-08-22 PROCEDURE — 2580000003 HC RX 258: Performed by: EMERGENCY MEDICINE

## 2025-08-22 PROCEDURE — 81003 URINALYSIS AUTO W/O SCOPE: CPT

## 2025-08-22 PROCEDURE — 84703 CHORIONIC GONADOTROPIN ASSAY: CPT

## 2025-08-22 PROCEDURE — 83690 ASSAY OF LIPASE: CPT

## 2025-08-22 RX ORDER — ATOGEPANT 60 MG/1
TABLET ORAL
COMMUNITY

## 2025-08-22 RX ORDER — MORPHINE SULFATE 4 MG/ML
4 INJECTION, SOLUTION INTRAMUSCULAR; INTRAVENOUS ONCE
Refills: 0 | Status: COMPLETED | OUTPATIENT
Start: 2025-08-22 | End: 2025-08-22

## 2025-08-22 RX ORDER — SUCRALFATE ORAL 1 G/10ML
1 SUSPENSION ORAL 4 TIMES DAILY
Qty: 1200 ML | Refills: 3 | Status: SHIPPED | OUTPATIENT
Start: 2025-08-22

## 2025-08-22 RX ORDER — ONDANSETRON 2 MG/ML
4 INJECTION INTRAMUSCULAR; INTRAVENOUS ONCE
Status: COMPLETED | OUTPATIENT
Start: 2025-08-22 | End: 2025-08-22

## 2025-08-22 RX ORDER — KETOROLAC TROMETHAMINE 15 MG/ML
15 INJECTION, SOLUTION INTRAMUSCULAR; INTRAVENOUS ONCE
Status: COMPLETED | OUTPATIENT
Start: 2025-08-22 | End: 2025-08-22

## 2025-08-22 RX ORDER — SODIUM CHLORIDE, SODIUM LACTATE, POTASSIUM CHLORIDE, AND CALCIUM CHLORIDE .6; .31; .03; .02 G/100ML; G/100ML; G/100ML; G/100ML
500 INJECTION, SOLUTION INTRAVENOUS ONCE
Status: COMPLETED | OUTPATIENT
Start: 2025-08-22 | End: 2025-08-22

## 2025-08-22 RX ORDER — FAMOTIDINE 20 MG/1
20 TABLET, FILM COATED ORAL 2 TIMES DAILY
Qty: 60 TABLET | Refills: 3 | Status: SHIPPED | OUTPATIENT
Start: 2025-08-22

## 2025-08-22 RX ORDER — 0.9 % SODIUM CHLORIDE 0.9 %
1000 INTRAVENOUS SOLUTION INTRAVENOUS ONCE
Status: COMPLETED | OUTPATIENT
Start: 2025-08-22 | End: 2025-08-22

## 2025-08-22 RX ORDER — METOCLOPRAMIDE HYDROCHLORIDE 5 MG/ML
10 INJECTION INTRAMUSCULAR; INTRAVENOUS ONCE
Status: COMPLETED | OUTPATIENT
Start: 2025-08-22 | End: 2025-08-22

## 2025-08-22 RX ORDER — NAPROXEN 500 MG/1
500 TABLET ORAL 2 TIMES DAILY WITH MEALS
Qty: 14 TABLET | Refills: 0 | Status: SHIPPED | OUTPATIENT
Start: 2025-08-22 | End: 2025-08-29

## 2025-08-22 RX ORDER — ONDANSETRON 4 MG/1
4 TABLET, ORALLY DISINTEGRATING ORAL 3 TIMES DAILY PRN
Qty: 21 TABLET | Refills: 0 | Status: SHIPPED | OUTPATIENT
Start: 2025-08-22

## 2025-08-22 RX ORDER — DIPHENHYDRAMINE HYDROCHLORIDE 50 MG/ML
50 INJECTION, SOLUTION INTRAMUSCULAR; INTRAVENOUS ONCE
Status: COMPLETED | OUTPATIENT
Start: 2025-08-22 | End: 2025-08-22

## 2025-08-22 RX ORDER — HALOPERIDOL 5 MG/ML
2.5 INJECTION INTRAMUSCULAR ONCE
Status: COMPLETED | OUTPATIENT
Start: 2025-08-22 | End: 2025-08-22

## 2025-08-22 RX ADMIN — DIPHENHYDRAMINE HYDROCHLORIDE 50 MG: 50 INJECTION INTRAMUSCULAR; INTRAVENOUS at 21:54

## 2025-08-22 RX ADMIN — MORPHINE SULFATE 4 MG: 4 INJECTION, SOLUTION INTRAMUSCULAR; INTRAVENOUS at 17:36

## 2025-08-22 RX ADMIN — ONDANSETRON 4 MG: 2 INJECTION, SOLUTION INTRAMUSCULAR; INTRAVENOUS at 16:42

## 2025-08-22 RX ADMIN — METOCLOPRAMIDE 10 MG: 5 INJECTION, SOLUTION INTRAMUSCULAR; INTRAVENOUS at 21:54

## 2025-08-22 RX ADMIN — SODIUM CHLORIDE 1000 ML: 0.9 INJECTION, SOLUTION INTRAVENOUS at 21:52

## 2025-08-22 RX ADMIN — KETOROLAC TROMETHAMINE 15 MG: 15 INJECTION, SOLUTION INTRAMUSCULAR; INTRAVENOUS at 16:42

## 2025-08-22 RX ADMIN — SODIUM CHLORIDE 1000 ML: 0.9 INJECTION, SOLUTION INTRAVENOUS at 16:42

## 2025-08-22 RX ADMIN — HYDROMORPHONE HYDROCHLORIDE 1 MG: 1 INJECTION, SOLUTION INTRAMUSCULAR; INTRAVENOUS; SUBCUTANEOUS at 19:05

## 2025-08-22 RX ADMIN — SODIUM CHLORIDE, SODIUM LACTATE, POTASSIUM CHLORIDE, AND CALCIUM CHLORIDE 500 ML: .6; .31; .03; .02 INJECTION, SOLUTION INTRAVENOUS at 18:01

## 2025-08-22 RX ADMIN — HALOPERIDOL LACTATE 2.5 MG: 5 INJECTION, SOLUTION INTRAMUSCULAR at 21:54

## 2025-08-22 RX ADMIN — ALUMINUM HYDROXIDE, MAGNESIUM HYDROXIDE, AND SIMETHICONE 40 ML: 200; 20; 200 SUSPENSION ORAL at 17:57

## 2025-08-22 RX ADMIN — FAMOTIDINE 20 MG: 10 INJECTION, SOLUTION INTRAVENOUS at 17:58

## 2025-08-22 ASSESSMENT — PAIN DESCRIPTION - DESCRIPTORS
DESCRIPTORS: STABBING;SPASM
DESCRIPTORS: STABBING

## 2025-08-22 ASSESSMENT — PAIN SCALES - GENERAL
PAINLEVEL_OUTOF10: 7
PAINLEVEL_OUTOF10: 8

## 2025-08-22 ASSESSMENT — PAIN DESCRIPTION - ORIENTATION: ORIENTATION: LEFT

## 2025-08-22 ASSESSMENT — PAIN DESCRIPTION - LOCATION
LOCATION: ABDOMEN;FLANK
LOCATION: ABDOMEN

## 2025-08-22 ASSESSMENT — PAIN - FUNCTIONAL ASSESSMENT
PAIN_FUNCTIONAL_ASSESSMENT: 0-10

## 2025-08-23 LAB — BACTERIA UR CULT: NORMAL

## 2025-08-27 ENCOUNTER — PATIENT MESSAGE (OUTPATIENT)
Dept: PRIMARY CARE CLINIC | Age: 45
End: 2025-08-27

## 2025-08-27 DIAGNOSIS — I31.39 PERICARDIAL EFFUSION: Primary | ICD-10-CM

## 2025-08-27 DIAGNOSIS — R00.0 TACHYCARDIA: ICD-10-CM

## 2025-08-28 ENCOUNTER — TELEPHONE (OUTPATIENT)
Dept: CARDIOLOGY CLINIC | Age: 45
End: 2025-08-28

## 2025-08-28 ENCOUNTER — TELEPHONE (OUTPATIENT)
Dept: PRIMARY CARE CLINIC | Age: 45
End: 2025-08-28

## 2025-09-02 ENCOUNTER — ANCILLARY PROCEDURE (OUTPATIENT)
Dept: CARDIOLOGY CLINIC | Age: 45
End: 2025-09-02

## 2025-09-02 DIAGNOSIS — R00.0 TACHYCARDIA: ICD-10-CM
